# Patient Record
Sex: MALE | Race: WHITE | NOT HISPANIC OR LATINO | Employment: OTHER | URBAN - METROPOLITAN AREA
[De-identification: names, ages, dates, MRNs, and addresses within clinical notes are randomized per-mention and may not be internally consistent; named-entity substitution may affect disease eponyms.]

---

## 2018-04-11 ENCOUNTER — TRANSFERRED RECORDS (OUTPATIENT)
Dept: HEALTH INFORMATION MANAGEMENT | Facility: CLINIC | Age: 68
End: 2018-04-11

## 2018-05-07 ENCOUNTER — TRANSFERRED RECORDS (OUTPATIENT)
Dept: HEALTH INFORMATION MANAGEMENT | Facility: CLINIC | Age: 68
End: 2018-05-07

## 2018-08-29 ENCOUNTER — TRANSFERRED RECORDS (OUTPATIENT)
Dept: HEALTH INFORMATION MANAGEMENT | Facility: CLINIC | Age: 68
End: 2018-08-29

## 2018-08-29 LAB
ALT SERPL-CCNC: 28 U/L (ref 0–78)
AST SERPL-CCNC: 17 U/L (ref 0–34)
CHOLEST SERPL-MCNC: 151 MG/DL (ref 97–200)
CREAT SERPL-MCNC: 1.35 MG/DL (ref 0.6–1.4)
GFR SERPL CREATININE-BSD FRML MDRD: 52 ML/MIN/1.73M2
GLUCOSE SERPL-MCNC: 96 MG/DL (ref 70–99)
HDLC SERPL-MCNC: 60 MG/DL (ref 32–72)
LDLC SERPL CALC-MCNC: 73 MG/DL (ref 0–129)
POTASSIUM SERPL-SCNC: 3.7 MEQ/L (ref 3.6–5.2)
TRIGL SERPL-MCNC: 91 MG/DL (ref 30–200)
TSH SERPL-ACNC: 2.59 UIU/ML (ref 0.34–4.82)

## 2018-08-30 ENCOUNTER — TRANSFERRED RECORDS (OUTPATIENT)
Dept: HEALTH INFORMATION MANAGEMENT | Facility: CLINIC | Age: 68
End: 2018-08-30

## 2018-09-18 ENCOUNTER — TRANSFERRED RECORDS (OUTPATIENT)
Dept: HEALTH INFORMATION MANAGEMENT | Facility: CLINIC | Age: 68
End: 2018-09-18

## 2018-09-27 ENCOUNTER — TRANSFERRED RECORDS (OUTPATIENT)
Dept: HEALTH INFORMATION MANAGEMENT | Facility: CLINIC | Age: 68
End: 2018-09-27

## 2019-05-01 ENCOUNTER — TRANSFERRED RECORDS (OUTPATIENT)
Dept: HEALTH INFORMATION MANAGEMENT | Facility: CLINIC | Age: 69
End: 2019-05-01

## 2019-08-01 ENCOUNTER — TRANSFERRED RECORDS (OUTPATIENT)
Dept: HEALTH INFORMATION MANAGEMENT | Facility: CLINIC | Age: 69
End: 2019-08-01

## 2021-02-02 ENCOUNTER — OFFICE VISIT (OUTPATIENT)
Dept: FAMILY MEDICINE | Facility: CLINIC | Age: 71
End: 2021-02-02
Payer: MEDICARE

## 2021-02-02 VITALS
SYSTOLIC BLOOD PRESSURE: 118 MMHG | TEMPERATURE: 97.5 F | HEART RATE: 57 BPM | WEIGHT: 199.8 LBS | DIASTOLIC BLOOD PRESSURE: 72 MMHG | HEIGHT: 69 IN | OXYGEN SATURATION: 97 % | BODY MASS INDEX: 29.59 KG/M2

## 2021-02-02 DIAGNOSIS — Z13.6 ENCOUNTER FOR ABDOMINAL AORTIC ANEURYSM (AAA) SCREENING: ICD-10-CM

## 2021-02-02 DIAGNOSIS — R19.4 RECENT CHANGE IN FREQUENCY OF BOWEL MOVEMENTS: ICD-10-CM

## 2021-02-02 DIAGNOSIS — H90.3 SENSORINEURAL HEARING LOSS (SNHL) OF BOTH EARS: ICD-10-CM

## 2021-02-02 DIAGNOSIS — Z00.00 ENCOUNTER FOR MEDICARE ANNUAL WELLNESS EXAM: Primary | ICD-10-CM

## 2021-02-02 DIAGNOSIS — Z86.0100 HISTORY OF COLONIC POLYPS: ICD-10-CM

## 2021-02-02 DIAGNOSIS — E78.5 DYSLIPIDEMIA: ICD-10-CM

## 2021-02-02 DIAGNOSIS — M85.80 AGE-RELATED BONE LOSS: ICD-10-CM

## 2021-02-02 DIAGNOSIS — Z23 NEED FOR VACCINATION: ICD-10-CM

## 2021-02-02 PROBLEM — E78.00 HIGH CHOLESTEROL: Status: ACTIVE | Noted: 2021-02-02

## 2021-02-02 LAB
BASOPHILS # BLD AUTO: 0 10E9/L (ref 0–0.2)
BASOPHILS NFR BLD AUTO: 0.5 %
DIFFERENTIAL METHOD BLD: ABNORMAL
EOSINOPHIL # BLD AUTO: 0.1 10E9/L (ref 0–0.7)
EOSINOPHIL NFR BLD AUTO: 1.2 %
ERYTHROCYTE [DISTWIDTH] IN BLOOD BY AUTOMATED COUNT: 13.8 % (ref 10–15)
HCT VFR BLD AUTO: 43.3 % (ref 40–53)
HGB BLD-MCNC: 14.3 G/DL (ref 13.3–17.7)
LYMPHOCYTES # BLD AUTO: 1.1 10E9/L (ref 0.8–5.3)
LYMPHOCYTES NFR BLD AUTO: 25.3 %
MCH RBC QN AUTO: 30 PG (ref 26.5–33)
MCHC RBC AUTO-ENTMCNC: 33 G/DL (ref 31.5–36.5)
MCV RBC AUTO: 91 FL (ref 78–100)
MONOCYTES # BLD AUTO: 0.4 10E9/L (ref 0–1.3)
MONOCYTES NFR BLD AUTO: 9.7 %
NEUTROPHILS # BLD AUTO: 2.7 10E9/L (ref 1.6–8.3)
NEUTROPHILS NFR BLD AUTO: 63.3 %
PLATELET # BLD AUTO: 129 10E9/L (ref 150–450)
RBC # BLD AUTO: 4.76 10E12/L (ref 4.4–5.9)
WBC # BLD AUTO: 4.3 10E9/L (ref 4–11)

## 2021-02-02 PROCEDURE — 99203 OFFICE O/P NEW LOW 30 MIN: CPT | Mod: 25 | Performed by: INTERNAL MEDICINE

## 2021-02-02 PROCEDURE — 36415 COLL VENOUS BLD VENIPUNCTURE: CPT | Performed by: INTERNAL MEDICINE

## 2021-02-02 PROCEDURE — G0009 ADMIN PNEUMOCOCCAL VACCINE: HCPCS | Mod: 59 | Performed by: INTERNAL MEDICINE

## 2021-02-02 PROCEDURE — 85025 COMPLETE CBC W/AUTO DIFF WBC: CPT | Performed by: INTERNAL MEDICINE

## 2021-02-02 PROCEDURE — 80053 COMPREHEN METABOLIC PANEL: CPT | Performed by: INTERNAL MEDICINE

## 2021-02-02 PROCEDURE — 80061 LIPID PANEL: CPT | Performed by: INTERNAL MEDICINE

## 2021-02-02 PROCEDURE — 82306 VITAMIN D 25 HYDROXY: CPT | Performed by: INTERNAL MEDICINE

## 2021-02-02 PROCEDURE — 90471 IMMUNIZATION ADMIN: CPT | Performed by: INTERNAL MEDICINE

## 2021-02-02 PROCEDURE — 90714 TD VACC NO PRESV 7 YRS+ IM: CPT | Performed by: INTERNAL MEDICINE

## 2021-02-02 PROCEDURE — G0438 PPPS, INITIAL VISIT: HCPCS | Performed by: INTERNAL MEDICINE

## 2021-02-02 PROCEDURE — 90732 PPSV23 VACC 2 YRS+ SUBQ/IM: CPT | Performed by: INTERNAL MEDICINE

## 2021-02-02 RX ORDER — SIMVASTATIN 40 MG
40 TABLET ORAL AT BEDTIME
Qty: 90 TABLET | Refills: 1 | Status: SHIPPED | OUTPATIENT
Start: 2021-02-02 | End: 2021-08-18

## 2021-02-02 RX ORDER — SIMVASTATIN 40 MG
TABLET ORAL
COMMUNITY
Start: 2021-01-20 | End: 2021-02-02

## 2021-02-02 ASSESSMENT — ANXIETY QUESTIONNAIRES
5. BEING SO RESTLESS THAT IT IS HARD TO SIT STILL: NOT AT ALL
7. FEELING AFRAID AS IF SOMETHING AWFUL MIGHT HAPPEN: NOT AT ALL
2. NOT BEING ABLE TO STOP OR CONTROL WORRYING: NOT AT ALL
3. WORRYING TOO MUCH ABOUT DIFFERENT THINGS: NOT AT ALL
GAD7 TOTAL SCORE: 0
6. BECOMING EASILY ANNOYED OR IRRITABLE: NOT AT ALL
1. FEELING NERVOUS, ANXIOUS, OR ON EDGE: NOT AT ALL

## 2021-02-02 ASSESSMENT — PATIENT HEALTH QUESTIONNAIRE - PHQ9: 5. POOR APPETITE OR OVEREATING: NOT AT ALL

## 2021-02-02 ASSESSMENT — MIFFLIN-ST. JEOR: SCORE: 1646.67

## 2021-02-02 NOTE — PROGRESS NOTES
Assessment and Plan    1. Encounter for Medicare annual wellness exam  Seeing this patient first time today. No records available, except Covid test at . Due for all HM.   - [place] Medicare AAA Screening; Future  - Lipid panel reflex to direct LDL Fasting  - Comprehensive metabolic panel  - CBC with platelets differential  - Pneumococcal vaccine 23 valent PPSV23  (Pneumovax) [00105]  - TD PRESERV FREE, IM (7+ YRS)  - Vitamin D Deficiency  - simvastatin (ZOCOR) 40 MG tablet; Take 1 tablet (40 mg) by mouth At Bedtime  Dispense: 90 tablet; Refill: 1    2. Dyslipidemia  - Lipid panel reflex to direct LDL Fasting  - simvastatin (ZOCOR) 40 MG tablet; Take 1 tablet (40 mg) by mouth At Bedtime  Dispense: 90 tablet; Refill: 1    3. History of colonic polyps  Pt has Hx of Colonic polyps, has Colonoscopy Q 5 yrs , awaiting for records. Last colonoscopy when pt was 68 yrs old as confirmed by him. Not due until 2023.     4. Sensorineural hearing loss (SNHL) of both ears  Previous Audiology test done at [place], will await for records.     5. Encounter for abdominal aortic aneurysm (AAA) screening  - [place] A[place] Medicare AAA Screening; Future    6. Age-related bone loss  - Vitamin D Deficiency    7. Need for vaccination  - Pneumococcal vaccine 23 valent PPSV23  (Pneumovax) [05323]  - TD PRESERV FREE, IM (7+ YRS)    8. Recent change in frequency of bowel movements  New problem, which patient has this issue. Not on any medications other than Simvastatin, no stress going on. Differential diagnosis of possible enteric infection will be checked before considering GI referral .   - Enteric Bacteria and Virus Panel by SACHI Stool; Future         Patient Instructions   Please do the lab work .   Please schedule AAA screening as discussed.    Continue the current Simvastatin.     ===========================    [facility]  ( [facility] )   Call : [phone]  Toll Free :  [phone]    ==============================    [facility]  ( [place] )   [facility]  Call : [phone]  Toll Free : [phone]    ==============================  [facility] [place]  Phone : [phone]    ===============================    [facility]   ( All Locations )   Call : [phone]  Toll Free : [phone]        Return in about 6 months (around 8/2/2021), or if symptoms worsen or fail to improve, for Follow up of last visit.    Susanna Acosta MD  [facility] [place]        Delfino Mendes is a 70 year old who presents to clinic today for the following health issues     HPI       New Patient/Transfer of Care    Annual Wellness Visit    Patient has been advised of split billing requirements and indicates understanding: Yes     Are you in the first 12 months of your Medicare Part B coverage?  No    Physical Health:    In general, how would you rate your overall physical health? excellent    Outside of work, how many days during the week do you exercise?6-7 days/week    Outside of work, approximately how many minutes a day do you exercise?greater than 60 minutes    If you drink alcohol do you typically have >3 drinks per day or >7 drinks per week? No    Do you usually eat at least 4 servings of fruit and vegetables a day, include whole grains & fiber and avoid regularly eating high fat or \"junk\" foods? Yes    Do you have any problems taking medications regularly? No    Do you have any side effects from medications? none    Needs assistance for the following daily activities: no assistance needed    Which of the following safety concerns are present in your home?  none identified     Hearing impairment: Yes, went to doctor a few years ago and saw a hearing doctor, off 10-20%.     In the past 6 months, have you been bothered by leaking of urine? yes    Mental Health:    In general, how would you " "rate your overall mental or emotional health? excellent  PHQ-2 Score:  Zero    Do you feel safe in your environment? Yes    Have you ever done Advance Care Planning? (For example, a Health Directive, POLST, or a discussion with a medical provider or your loved ones about your wishes)? Yes, patient states has an Advance Care Planning document and will bring a copy to the clinic.    Fall risk:  Fallen 2 or more times in the past year?: No  Any fall with injury in the past year?: No    Cognitive Screenin) Repeat 3 items (Leader, Season, Table)    2) Clock draw: NORMAL  3) 3 item recall: Recalls 1 object   Results: clock completed correctly, was able to say 1 of 3 words.     Mini-CogTM Copyright S Mian. Licensed by the author for use in [employer]; reprinted with permission ([email or web address]). All rights reserved.      Do you have sleep apnea, excessive snoring or daytime drowsiness?: no    Current providers sharing in care for this patient include:   Patient Care Team:  Susanna Acosta MD as PCP - General (Internal Medicine)    Patient has been advised of split billing requirements and indicates understanding: Yes    Review of Systems   Constitutional, HEENT, cardiovascular, pulmonary, GI, , musculoskeletal, neuro, skin, endocrine and psych systems are negative, except as otherwise noted.      Objective    /72   Pulse 57   Temp 97.5  F (36.4  C) (Tympanic)   Ht 1.753 m (5' 9\")   Wt 90.6 kg (199 lb 12.8 oz)   SpO2 97%   BMI 29.51 kg/m    Body mass index is 29.51 kg/m .  Physical Exam   GENERAL: healthy, alert and no distress  EYES: Eyes grossly normal to inspection, PERRL and conjunctivae and sclerae normal  HENT: ear canals and TM's normal, nose and mouth without ulcers or lesions  NECK: no adenopathy, no asymmetry, masses, or scars and thyroid normal to palpation  RESP: lungs clear to auscultation - no rales, rhonchi or wheezes  CV: regular rate and rhythm, normal S1 S2, no S3 or S4, " no murmur, click or rub, no peripheral edema and peripheral pulses strong  ABDOMEN: soft, nontender, no hepatosplenomegaly, no masses and bowel sounds normal  MS: no gross musculoskeletal defects noted, no edema  SKIN: no suspicious lesions or rashes  NEURO: Normal strength and tone, mentation intact and speech normal  PSYCH: mentation appears normal, affect normal/bright

## 2021-02-02 NOTE — Clinical Note
Please recheck my billing on this patient for any corrections.   Thank you,  Susanna Acosta MD on 2/2/2021 at 1:38 PM

## 2021-02-02 NOTE — PATIENT INSTRUCTIONS
Please do the lab work .   Please schedule AAA screening as discussed.    Continue the current Simvastatin.     ===========================    [facility]  ( [facility] )   Call : [phone]  Toll Free : [phone]    ==============================    [facility]  ( [place] )   [place] [facility]  Call : [phone]  Toll Free : [phone]    ==============================  [facility] [place]  Phone : [phone]    ===============================    [facility]   ( All Locations )   Call : [phone]  Toll Free : [phone]

## 2021-02-03 LAB
ALBUMIN SERPL-MCNC: 4.1 G/DL (ref 3.4–5)
ALP SERPL-CCNC: 51 U/L (ref 40–150)
ALT SERPL W P-5'-P-CCNC: 34 U/L (ref 0–70)
ANION GAP SERPL CALCULATED.3IONS-SCNC: 4 MMOL/L (ref 3–14)
AST SERPL W P-5'-P-CCNC: 28 U/L (ref 0–45)
BILIRUB SERPL-MCNC: 0.7 MG/DL (ref 0.2–1.3)
BUN SERPL-MCNC: 18 MG/DL (ref 7–30)
CALCIUM SERPL-MCNC: 9.6 MG/DL (ref 8.5–10.1)
CHLORIDE SERPL-SCNC: 107 MMOL/L (ref 94–109)
CHOLEST SERPL-MCNC: 201 MG/DL
CO2 SERPL-SCNC: 29 MMOL/L (ref 20–32)
CREAT SERPL-MCNC: 1.03 MG/DL (ref 0.66–1.25)
DEPRECATED CALCIDIOL+CALCIFEROL SERPL-MC: 58 UG/L (ref 20–75)
GFR SERPL CREATININE-BSD FRML MDRD: 72 ML/MIN/{1.73_M2}
GLUCOSE SERPL-MCNC: 92 MG/DL (ref 70–99)
HDLC SERPL-MCNC: 61 MG/DL
LDLC SERPL CALC-MCNC: 122 MG/DL
NONHDLC SERPL-MCNC: 140 MG/DL
POTASSIUM SERPL-SCNC: 4 MMOL/L (ref 3.4–5.3)
PROT SERPL-MCNC: 7.9 G/DL (ref 6.8–8.8)
SODIUM SERPL-SCNC: 140 MMOL/L (ref 133–144)
TRIGL SERPL-MCNC: 88 MG/DL

## 2021-02-03 ASSESSMENT — ANXIETY QUESTIONNAIRES: GAD7 TOTAL SCORE: 0

## 2021-02-05 ENCOUNTER — TELEPHONE (OUTPATIENT)
Dept: FAMILY MEDICINE | Facility: CLINIC | Age: 71
End: 2021-02-05

## 2021-02-05 DIAGNOSIS — R19.4 RECENT CHANGE IN FREQUENCY OF BOWEL MOVEMENTS: ICD-10-CM

## 2021-02-05 DIAGNOSIS — R19.4 RECENT CHANGE IN FREQUENCY OF BOWEL MOVEMENTS: Primary | ICD-10-CM

## 2021-02-05 PROCEDURE — 87506 IADNA-DNA/RNA PROBE TQ 6-11: CPT | Performed by: INTERNAL MEDICINE

## 2021-02-05 RX ORDER — BACILLUS COAGULANS/INULIN 1B-250 MG
1 CAPSULE ORAL DAILY
Qty: 90 CAPSULE | Refills: 1 | Status: SHIPPED | OUTPATIENT
Start: 2021-02-05 | End: 2024-05-07

## 2021-02-05 NOTE — TELEPHONE ENCOUNTER
----- Message from Susanna Acosta MD sent at 2/5/2021  4:50 PM CST -----  Your Stool studies are normal. Possible need of change in your diet which is causing Bowel changes. Sent in probiotic to your pharmacy for improvement. Let me know how you are doing in 4 weeks. Will placed gastroenterology referral for further recommendations.     Susanna Acosta MD on 2/5/2021 at 4:50 PM

## 2021-02-08 ENCOUNTER — HOSPITAL ENCOUNTER (OUTPATIENT)
Dept: ULTRASOUND IMAGING | Facility: CLINIC | Age: 71
Discharge: HOME | End: 2021-02-08
Attending: INTERNAL MEDICINE | Admitting: INTERNAL MEDICINE
Payer: MEDICARE

## 2021-02-08 DIAGNOSIS — Z00.00 ENCOUNTER FOR MEDICARE ANNUAL WELLNESS EXAM: ICD-10-CM

## 2021-02-08 DIAGNOSIS — Z13.6 ENCOUNTER FOR ABDOMINAL AORTIC ANEURYSM (AAA) SCREENING: ICD-10-CM

## 2021-02-08 PROCEDURE — 76706 US ABDL AORTA SCREEN AAA: CPT

## 2021-03-09 ENCOUNTER — TRANSFERRED RECORDS (OUTPATIENT)
Dept: FAMILY MEDICINE | Facility: CLINIC | Age: 71
End: 2021-03-09

## 2021-03-09 NOTE — PROGRESS NOTES
Records received from [facility] and given to Dr Acosta for review.  Maria Del Rosario Kilpatrick,

## 2021-03-17 ENCOUNTER — MYC MEDICAL ADVICE (OUTPATIENT)
Dept: FAMILY MEDICINE | Facility: CLINIC | Age: 71
End: 2021-03-17

## 2021-08-18 DIAGNOSIS — Z00.00 ENCOUNTER FOR MEDICARE ANNUAL WELLNESS EXAM: ICD-10-CM

## 2021-08-18 DIAGNOSIS — E78.5 DYSLIPIDEMIA: ICD-10-CM

## 2021-08-18 RX ORDER — SIMVASTATIN 40 MG
40 TABLET ORAL AT BEDTIME
Qty: 90 TABLET | Refills: 1 | Status: SHIPPED | OUTPATIENT
Start: 2021-08-18 | End: 2022-02-12

## 2021-08-18 NOTE — TELEPHONE ENCOUNTER
Prescription approved per [facility] Refill Protocol.    Mariela GODO RN  EP Triage

## 2021-09-09 ENCOUNTER — OFFICE VISIT (OUTPATIENT)
Dept: FAMILY MEDICINE | Facility: CLINIC | Age: 71
End: 2021-09-09
Payer: MEDICARE

## 2021-09-09 VITALS — SYSTOLIC BLOOD PRESSURE: 112 MMHG | DIASTOLIC BLOOD PRESSURE: 78 MMHG

## 2021-09-09 DIAGNOSIS — L82.1 SEBORRHEIC KERATOSES: ICD-10-CM

## 2021-09-09 DIAGNOSIS — Z85.828 HISTORY OF SKIN CANCER: ICD-10-CM

## 2021-09-09 DIAGNOSIS — D22.9 MULTIPLE BENIGN NEVI: ICD-10-CM

## 2021-09-09 DIAGNOSIS — D18.01 CHERRY ANGIOMA: ICD-10-CM

## 2021-09-09 DIAGNOSIS — L57.0 ACTINIC KERATOSES: ICD-10-CM

## 2021-09-09 DIAGNOSIS — L57.8 SOLAR ELASTOSIS: ICD-10-CM

## 2021-09-09 DIAGNOSIS — L81.4 LENTIGINES: Primary | ICD-10-CM

## 2021-09-09 PROCEDURE — 17000 DESTRUCT PREMALG LESION: CPT | Performed by: PHYSICIAN ASSISTANT

## 2021-09-09 PROCEDURE — 17003 DESTRUCT PREMALG LES 2-14: CPT | Performed by: PHYSICIAN ASSISTANT

## 2021-09-09 PROCEDURE — 99213 OFFICE O/P EST LOW 20 MIN: CPT | Mod: 25 | Performed by: PHYSICIAN ASSISTANT

## 2021-09-09 NOTE — LETTER
9/9/2021         RE: Ricahrd Kitchen  [address] [place] [address]        Dear Colleague,    Thank you for referring your patient, Richard Kitchen, to the [facility]. Please see a copy of my visit note below.    HPI:  Richard Kitchen is a 71 year old male patient here today for FBE. Has some scaly spots on face .  Patient states this has been present for a while.  Patient reports the following symptoms: scaly .  Patient reports the following previous treatments: none.  Patient reports the following modifying factors: none.  Associated symptoms: none.  Patient has no other skin complaints today.  Remainder of the HPI, Meds, PMH, Allergies, FH, and SH was reviewed in chart.    Pertinent Hx:   Skin cancer on nose and right ear in the past.  No past medical history on file.    Past Surgical History:   Procedure Laterality Date     BIOPSY  2012, 2015    Skin cancer     HERNIA REPAIR  1984        Family History   Problem Relation Age of Onset     Colon Cancer Paternal Grandmother      Colon Cancer Other      Prostate Cancer Father        Social History     Socioeconomic History     Marital status:      Spouse name: Not on file     Number of children: Not on file     Years of education: Not on file     Highest education level: Not on file   Occupational History     Not on file   Tobacco Use     Smoking status: Former Smoker     Packs/day: 0.50     Years: 1.00     Pack years: 0.50     Types: Cigarettes     Smokeless tobacco: Never Used     Tobacco comment: 1 cigareete a year with freinds but never since age of 18 yrs when he smoked for 1 year.   Substance and Sexual Activity     Alcohol use: Yes     Comment: 6 glasses of wine per week     Drug use: Never     Sexual activity: Not Currently     Partners: Female   Other Topics Concern     Parent/sibling w/ CABG, MI or angioplasty before 65F 55M? No   Social History Narrative     Not on file     Social Determinants of Health      Financial Resource Strain:      Difficulty of Paying Living Expenses:    Food Insecurity:      Worried About Running Out of Food in the Last Year:      Ran Out of Food in the Last Year:    Transportation Needs:      Lack of Transportation (Medical):      Lack of Transportation (Non-Medical):    Physical Activity:      Days of Exercise per Week:      Minutes of Exercise per Session:    Stress:      Feeling of Stress :    Social Connections:      Frequency of Communication with Friends and Family:      Frequency of Social Gatherings with Friends and Family:      Attends [employer] Services:      Active Member of Clubs or Organizations:      Attends Club or Organization Meetings:      Marital Status:    Intimate Partner Violence:      Fear of Current or Ex-Partner:      Emotionally Abused:      Physically Abused:      Sexually Abused:        Outpatient Encounter Medications as of 9/9/2021   Medication Sig Dispense Refill     Bacillus Coagulans-Inulin (PROBIOTIC) 1-250 BILLION-MG CAPS Take 1 each by mouth daily 90 capsule 1     simvastatin (ZOCOR) 40 MG tablet Take 1 tablet (40 mg) by mouth At Bedtime 90 tablet 1     No facility-administered encounter medications on file as of 9/9/2021.       Review Of Systems:  Skin: scaly spots  Eyes: negative  Ears/Nose/Throat: negative  Respiratory: No shortness of breath, dyspnea on exertion, cough, or hemoptysis  Cardiovascular: negative  Gastrointestinal: negative  Genitourinary: negative  Musculoskeletal: negative  Neurologic: negative  Psychiatric: negative  Hematologic/Lymphatic/Immunologic: negative  Endocrine: negative      Objective:     There were no vitals taken for this visit.  Eyes: Conjunctivae/lids: Normal   ENT: Lips:  Normal  MSK: Normal  Cardiovascular: Peripheral edema none  Pulm: Breathing Normal  Neuro/Psych: Orientation: A/O x 3. Normal; Mood/Affect: Normal, NAD, WDWN  Pt accompanied by: self  Following areas examined: Scalp, face, eyelids, lips, neck,  chest, abdomen, back, R&L upper and lower extremities, buttock, hips.  Pt defers exam of groin and genitals.   Daugherty skin type:ii   Findings:  Red smooth well-defined macules on trunk and extremities.  Brown, stuck-on scaly appearing papules on trunk and extremities.  Well circumscribed macules with symmetric color distribution on trunk and extremities.  Tan WD smooth macules on face, neck, trunk, and extremities.  Pink gritty macule/s on cheeks, antihelix, forehead  Rhytides, hypo/hyperpigmentation, and atrophy      Assessment and Plan:     1) Cherry angiomas, Seborrheic keratoses, Benign nevi, Lentigines     I discussed the specifics of tumor, prognosis, and genetics of benign lesions.  I explained that treatment of these lesions would be purely cosmetic and not medically neccessary.  I discussed with patient different removal options including excision, cryotherapy, cautery and /or laser.  Lesion may recur and/or may not completely resolve. May need additional treatment.     2) Actinic keratoses x 7 and solar elastosis    LN2 for 5 seconds x 2. Discussed AE include hypopigmentation (white spot) and recurrence. Follow up in 2-3 months to recheck lesions. There is a risk of AKs developing into a SCC.   Treatment options include LN2 vs PDT vs Efudex. Pt elected LN2    3) history of skin cancer  Nose and right ear  No evidence of recurrence  SUZANNA  Signs and Symptoms of non-melanoma skin cancer and ABCDEs of melanoma reviewed with patient. Patient encouraged to perform monthly self skin exams and educated on how to perform them. UV precautions reviewed with patient. Patient was asked about new or changing moles/lesions on body.   Wear a sunscreen with at least SPF 30 on your face, ears, neck and V of the chest daily. Wear sunscreen on other areas of the body if those areas are exposed to the sun throughout the day. Sunscreens can contain physical and/or chemical blockers. Physical blockers are less likely to clog  pores, these include zinc oxide and titanium dioxide. Reapply every two hour and after swimming.Sunscreen examples: https://www.ewg.org/sunscreen/    Proper skin care from [facility]:    -Eliminate harsh soaps as they strip the natural oils from the skin, often resulting in dry itchy skin ( i.e. Dial, Zest, Citizen of [place] Spring)  -Use mild soaps such as Cetaphil or Dove Sensitive Skin in the shower. You do not need to use soap on arms, legs, and trunk every time you shower unless visibly soiled.   -Avoid hot or cold showers.  -After showering, lightly dry off and apply moisturizing within 2-3 minutes. This will help trap moisture in the skin.   -Aggressive use of a moisturizer at least 1-2 times a day to the entire body (including -Vanicream, Cetaphil, Aquaphor or Cerave) and moisturize hands after every washing.  -We recommend using moisturizers that come in a tub that needs to be scooped out, not a pump. This has more of an oil base. It will hold moisture in your skin much better than a water base moisturizer. The above recommended are non-pore clogging.         It was a pleasure speaking with Richard Kitchen today.       Follow up in yearly FBE. Sooner if aks not resolved.         Again, thank you for allowing me to participate in the care of your patient.        Sincerely,        Lissy Perry PA-C

## 2021-09-09 NOTE — PROGRESS NOTES
HPI:  Richard Kitchne is a 71 year old male patient here today for FBE. Has some scaly spots on face .  Patient states this has been present for a while.  Patient reports the following symptoms: scaly .  Patient reports the following previous treatments: none.  Patient reports the following modifying factors: none.  Associated symptoms: none.  Patient has no other skin complaints today.  Remainder of the HPI, Meds, PMH, Allergies, FH, and SH was reviewed in chart.    Pertinent Hx:   Skin cancer on nose and right ear in the past.  No past medical history on file.    Past Surgical History:   Procedure Laterality Date     BIOPSY  2012, 2015    Skin cancer     HERNIA REPAIR  1984        Family History   Problem Relation Age of Onset     Colon Cancer Paternal Grandmother      Colon Cancer Other      Prostate Cancer Father        Social History     Socioeconomic History     Marital status:      Spouse name: Not on file     Number of children: Not on file     Years of education: Not on file     Highest education level: Not on file   Occupational History     Not on file   Tobacco Use     Smoking status: Former Smoker     Packs/day: 0.50     Years: 1.00     Pack years: 0.50     Types: Cigarettes     Smokeless tobacco: Never Used     Tobacco comment: 1 cigareete a year with harlan but never since age of 18 yrs when he smoked for 1 year.   Substance and Sexual Activity     Alcohol use: Yes     Comment: 6 glasses of wine per week     Drug use: Never     Sexual activity: Not Currently     Partners: Female   Other Topics Concern     Parent/sibling w/ CABG, MI or angioplasty before 65F 55M? No   Social History Narrative     Not on file     Social Determinants of Health     Financial Resource Strain:      Difficulty of Paying Living Expenses:    Food Insecurity:      Worried About Running Out of Food in the Last Year:      Ran Out of Food in the Last Year:    Transportation Needs:      Lack of Transportation (Medical):       Lack of Transportation (Non-Medical):    Physical Activity:      Days of Exercise per Week:      Minutes of Exercise per Session:    Stress:      Feeling of Stress :    Social Connections:      Frequency of Communication with Friends and Family:      Frequency of Social Gatherings with Friends and Family:      Attends Baptism Services:      Active Member of Clubs or Organizations:      Attends Club or Organization Meetings:      Marital Status:    Intimate Partner Violence:      Fear of Current or Ex-Partner:      Emotionally Abused:      Physically Abused:      Sexually Abused:        Outpatient Encounter Medications as of 9/9/2021   Medication Sig Dispense Refill     Bacillus Coagulans-Inulin (PROBIOTIC) 1-250 BILLION-MG CAPS Take 1 each by mouth daily 90 capsule 1     simvastatin (ZOCOR) 40 MG tablet Take 1 tablet (40 mg) by mouth At Bedtime 90 tablet 1     No facility-administered encounter medications on file as of 9/9/2021.       Review Of Systems:  Skin: scaly spots  Eyes: negative  Ears/Nose/Throat: negative  Respiratory: No shortness of breath, dyspnea on exertion, cough, or hemoptysis  Cardiovascular: negative  Gastrointestinal: negative  Genitourinary: negative  Musculoskeletal: negative  Neurologic: negative  Psychiatric: negative  Hematologic/Lymphatic/Immunologic: negative  Endocrine: negative      Objective:     There were no vitals taken for this visit.  Eyes: Conjunctivae/lids: Normal   ENT: Lips:  Normal  MSK: Normal  Cardiovascular: Peripheral edema none  Pulm: Breathing Normal  Neuro/Psych: Orientation: A/O x 3. Normal; Mood/Affect: Normal, NAD, WDWN  Pt accompanied by: self  Following areas examined: Scalp, face, eyelids, lips, neck, chest, abdomen, back, R&L upper and lower extremities, buttock, hips.  Pt defers exam of groin and genitals.   Daugherty skin type:ii   Findings:  Red smooth well-defined macules on trunk and extremities.  Brown, stuck-on scaly appearing papules on trunk and  extremities.  Well circumscribed macules with symmetric color distribution on trunk and extremities.  Tan WD smooth macules on face, neck, trunk, and extremities.  Pink gritty macule/s on cheeks, antihelix, forehead  Rhytides, hypo/hyperpigmentation, and atrophy      Assessment and Plan:     1) Cherry angiomas, Seborrheic keratoses, Benign nevi, Lentigines     I discussed the specifics of tumor, prognosis, and genetics of benign lesions.  I explained that treatment of these lesions would be purely cosmetic and not medically neccessary.  I discussed with patient different removal options including excision, cryotherapy, cautery and /or laser.  Lesion may recur and/or may not completely resolve. May need additional treatment.     2) Actinic keratoses x 7 and solar elastosis    LN2 for 5 seconds x 2. Discussed AE include hypopigmentation (white spot) and recurrence. Follow up in 2-3 months to recheck lesions. There is a risk of AKs developing into a SCC.   Treatment options include LN2 vs PDT vs Efudex. Pt elected LN2    3) history of skin cancer  Nose and right ear  No evidence of recurrence  SUZANNA- BCC on right nasal side wall mohs 2007  LM on right nasal ala 2009  SCCIS on right extensor forearm treated  2012  SCC on right shin  Signs and Symptoms of non-melanoma skin cancer and ABCDEs of melanoma reviewed with patient. Patient encouraged to perform monthly self skin exams and educated on how to perform them. UV precautions reviewed with patient. Patient was asked about new or changing moles/lesions on body.   Wear a sunscreen with at least SPF 30 on your face, ears, neck and V of the chest daily. Wear sunscreen on other areas of the body if those areas are exposed to the sun throughout the day. Sunscreens can contain physical and/or chemical blockers. Physical blockers are less likely to clog pores, these include zinc oxide and titanium dioxide. Reapply every two hour and after swimming.Sunscreen examples:  https://www.ewg.org/sunscreen/    Proper skin care from [facility]:    -Eliminate harsh soaps as they strip the natural oils from the skin, often resulting in dry itchy skin ( i.e. Dial, Zest, [place])  -Use mild soaps such as Cetaphil or Dove Sensitive Skin in the shower. You do not need to use soap on arms, legs, and trunk every time you shower unless visibly soiled.   -Avoid hot or cold showers.  -After showering, lightly dry off and apply moisturizing within 2-3 minutes. This will help trap moisture in the skin.   -Aggressive use of a moisturizer at least 1-2 times a day to the entire body (including -Vanicream, Cetaphil, Aquaphor or Cerave) and moisturize hands after every washing.  -We recommend using moisturizers that come in a tub that needs to be scooped out, not a pump. This has more of an oil base. It will hold moisture in your skin much better than a water base moisturizer. The above recommended are non-pore clogging.         It was a pleasure speaking with Richard Kitchen today.       Follow up in yearly FBE. Sooner if aks not resolved.

## 2021-09-09 NOTE — PATIENT INSTRUCTIONS
WOUND CARE INSTRUCTIONS  FOR CRYOSURGERY  For questions please call [phone]        This area treated with liquid nitrogen will form a blister. You do not need to bandage the area until after the blister forms and breaks (which may be a few days).  When the blister breaks, begin daily dressing changes as follows:    1) Clean and dry the area with tap water using clean Q-tip or sterile gauze pad.    2) Apply Vaseline or Aquaphor over entire wound. Other options include Polysporin ointment or Bacitracin ointment over entire wound.  Do NOT use Neosporin ointment.    3) Cover the wound with a band-aid or sterile non-stick gauze pad and micropore paper tape.      REPEAT THESE INSTRUCTIONS AT LEAST ONCE A DAY UNTIL THE WOUND HAS COMPLETELY HEALED.        It is an old wives tale that a wound heals better when it is exposed to air and allowed to dry out. The wound will heal faster with a better cosmetic result if it is kept moist with ointment and covered with a bandage.  Do not let the wound dry out.      Supplies Needed:     *Cotton tipped applicators (Q-tips)   *Polysporin ointment or Bacitracin ointment (NOT NEOSPORIN)   *Band-aids, or non stick gauze pads and micropore paper tape    PATIENT INFORMATION    During the healing process you will notice a number of changes. All wounds develop a small halo of redness surrounding the wound.  This means healing is occurring. Severe itching with extensive redness usually indicates sensitivity to the ointment or bandage tape used to dress the wound.  You should call our office if this develops.      Swelling and/or discoloration around your surgical site is common, particularly when performed around the eye.    All wounds normally drain.  The larger the wound the more drainage there will be.  After 7-10 days, you will notice the wound beginning to shrink and new skin will begin to grow.  The wound is healed when you can see skin has formed over the entire area.  A healed  wound has a healthy, shiny look to the surface and is red to dark pink in color to normalize.  Wounds may take approximately 4-6 weeks to heal.  Larger wounds may take 6-8 weeks.  After the wound is healed you may discontinue dressing changes.    You may experience a sensation of tightness as your wound heals. This is normal and will gradually subside.    Your healed wound may be sensitive to temperature changes. This sensitivity improves with time, but if you re having a lot of discomfort, try to avoid temperature extremes.    Patients frequently experience itching after their wound appears to have healed because of the continue healing under the skin.  Plain Vaseline will help relieve the itching.           Proper skin care from [facility]:    -Eliminate harsh soaps as they strip the natural oils from the skin, often resulting in dry itchy skin ( i.e. Dial, Zest, Haitian Spring)  -Use mild soaps such as Cetaphil or Dove Sensitive Skin in the shower. You do not need to use soap on arms, legs, and trunk every time you shower unless visibly soiled.   -Avoid hot or cold showers.  -After showering, lightly dry off and apply moisturizing within 2-3 minutes. This will help trap moisture in the skin.   -Aggressive use of a moisturizer at least 1-2 times a day to the entire body (including -Vanicream, Cetaphil, Aquaphor or Cerave) and moisturize hands after every washing.  -We recommend using moisturizers that come in a tub that needs to be scooped out, not a pump. This has more of an oil base. It will hold moisture in your skin much better than a water base moisturizer. The above recommended are non-pore clogging.      Wear a sunscreen with at least SPF 30 on your face, ears, neck and V of the chest daily. Wear sunscreen on other areas of the body if those areas are exposed to the sun throughout the day. Sunscreens can contain physical and/or chemical blockers. Physical blockers are less likely to clog pores,  these include zinc oxide and titanium dioxide. Reapply every two hour and after swimming.     Sunscreen examples: https://www.ewg.org/sunscreen/    UV radiation  UVA radiation remains constant throughout the day and throughout the year. It is a longer wavelength than UVB and therefore penetrates deeper into the skin leading to immediate and delayed tanning, photoaging, and skin cancer. 70-80% of UVA and UVB radiation occurs between the hours of 10am-2pm.  UVB radiation  UVB radiation causes the most harmful effects and is more significant during the summer months. However, snow and ice can reflect UVB radiation leading to skin damage during the winter months as well. UVB radiation is responsible for tanning, burning, inflammation, delayed erythema (pinkness), pigmentation (brown spots), and skin cancer.     I recommend self monthly full body exams and yearly full body exams with a dermatology provider. If you develop a new or changing lesion please follow up for examination. Most skin cancers are pink and scaly or pink and pearly. However, we do see blue/brown/black skin cancers.  Consider the ABCDEs of melanoma when giving yourself your monthly full body exam ( don't forget the groin, buttocks, feet, toes, etc). A-asymmetry, B-borders, C-color, D-diameter, E-elevation or evolving. If you see any of these changes please follow up in clinic. If you cannot see your back I recommend purchasing a hand held mirror to use with a larger wall mirror.

## 2021-09-17 ENCOUNTER — TRANSFERRED RECORDS (OUTPATIENT)
Dept: HEALTH INFORMATION MANAGEMENT | Facility: CLINIC | Age: 71
End: 2021-09-17

## 2021-10-11 ENCOUNTER — HEALTH MAINTENANCE LETTER (OUTPATIENT)
Age: 71
End: 2021-10-11

## 2021-10-29 ENCOUNTER — MYC MEDICAL ADVICE (OUTPATIENT)
Dept: FAMILY MEDICINE | Facility: CLINIC | Age: 71
End: 2021-10-29

## 2022-02-10 DIAGNOSIS — E78.5 DYSLIPIDEMIA: ICD-10-CM

## 2022-02-10 DIAGNOSIS — Z00.00 ENCOUNTER FOR MEDICARE ANNUAL WELLNESS EXAM: ICD-10-CM

## 2022-02-11 NOTE — TELEPHONE ENCOUNTER
Routing refill request to provider for review/approval because:  Labs not current:  PHIL GOOD RN  EP Triage

## 2022-02-12 RX ORDER — SIMVASTATIN 40 MG
40 TABLET ORAL AT BEDTIME
Qty: 90 TABLET | Refills: 0 | Status: SHIPPED | OUTPATIENT
Start: 2022-02-12 | End: 2022-05-16

## 2022-02-12 NOTE — TELEPHONE ENCOUNTER
Refill done.    Please call the patient and schedule AWV which he is due at this time, to further take care of his medical conditions and medications.     Thank you,  Susanna Acosta MD on 2/12/2022

## 2022-02-15 NOTE — PROGRESS NOTES
Nurse Note      Itinerary:  [place]       Departure Date: 2-      Return Date: 2/27/22      Length of Trip 10 days      Reason for Travel: Tourism           Urban or rural: both      Accommodations: Hotel        IMMUNIZATION HISTORY  Have you received any immunizations within the past 4 weeks?  No  Have you ever fainted from having your blood drawn or from an injection?  No  Have you ever had a fever reaction to vaccination?  No  Have you ever had any bad reaction or side effect from any vaccination?  No  Have you ever had hepatitis A or B vaccine?  unsure  Do you live (or work closely) with anyone who has AIDS, an AIDS-like condition, any other immune disorder or who is on chemotherapy for cancer?  No  Do you have a family history of immunodeficiency?  No  Have you received any injection of immune globulin or any blood products during the past 12 months?  No    Patient roomed by AMINA Stauffer  Richard Kitchen is a 71 year old male seen today alone for counsultation for international travel.   Patient will be departing in  1 day(s) and  traveling with organized tour group  And with friends .      Patient itinerary :  will be in the [facility] and [facility] [place] which risk for Dengue Fever, food borne illnesses, Typhoid and Covid. exposure.      Patient's activities will include sightseeing.    Patient's country [place] birth is [place]    Special medical concerns: recent loss of loved one ( wife )   Pre-travel questionnaire was completed by patient and reviewed by provider.     Vitals: BP (!) 144/72 (BP Location: Left arm, Patient Position: Sitting)   Pulse 66   Temp 98  F (36.7  C) (Temporal)   Wt 87 kg (191 lb 11.2 oz)   SpO2 97%   BMI 28.31 kg/m    BMI= Body mass index is 28.31 kg/m .    EXAM:  General:  Well-nourished, well-developed in no acute distress.  Appears to be stated age, interacts appropriately and expresses understanding of information given to patient.    Current  Outpatient Medications   Medication Sig Dispense Refill     azithromycin (ZITHROMAX) 500 MG tablet Take 1 tablet (500 mg) by mouth daily for 3 doses Take 1 tablet a day for up to 3 days for severe diarrhea 3 tablet 0     Bacillus Coagulans-Inulin (PROBIOTIC) 1-250 BILLION-MG CAPS Take 1 each by mouth daily 90 capsule 1     simvastatin (ZOCOR) 40 MG tablet Take 1 tablet (40 mg) by mouth At Bedtime 90 tablet 0     Patient Active Problem List   Diagnosis     High cholesterol     Sensorineural hearing loss (SNHL) of both ears     Allergies   Allergen Reactions     Similasan Hay Fever Relief [Cold-Eeze]          Immunizations discussed include:   Covid 19: Up to date  Hepatitis A:  Ordered/given today, risks, benefits and side effects reviewed  Hepatitis B: future order written   Influenza: Up to date  Typhoid: Ordered/given today, risks, benefits and side effects reviewed  Rabies: Not indicated  Yellow Fever: Not indicated  Japanese Encephalitis: Not indicated  Meningococcus: Not indicated  Tetanus/Diphtheria: Up to date  Measles/Mumps/Rubella: Immune by disease history per patient report  Cholera: Not needed  Polio: Up to date  Pneumococcal: Up to date  Varicella: Immune by disease history per patient report  Shingrix: advised to get at future wellness visit   HPV:  Not indicated     TB: low risk     ASSESSMENT/PLAN:  Richard was seen today for travel clinic.    Diagnoses and all orders for this visit:    Travel advice encounter  -     HEPATITIS A VACCINE (ADULT)  -     TYPHOID VACCINE, IM  -     azithromycin (ZITHROMAX) 500 MG tablet; Take 1 tablet (500 mg) by mouth daily for 3 doses Take 1 tablet a day for up to 3 days for severe diarrhea    Need for hepatitis B vaccination  -     HEP B VAC ADULT 3 DOSE IM; Future    Other orders  -     Cancel: HEP B VAC ADULT 3 DOSE IM      I have reviewed general recommendations for safe travel   including: food/water precautions, insect precautions, safer sex   practices given high  prevalence of Zika, HIV and other STDs,   roadway safety. Educational materials and [facility] report provided.    [place] prophylaxis recommended: none  Symptomatic treatment for traveler's diarrhea: azithromycin  [place] illness prevention and treatment: none    Personal protective measures reviewed including hand sanitizing and contact precautions for the prevention of viral illnesses. Cover coughs and masking  during travel and upon return.  Current COVID 19 pandemic.   Monitor / follow current CDC guidelines.    Country specific and CDC Covid 19  testing requirements and resources given to patient.    Evacuation insurance advised and resources were provided to patient.  Travel Advisory: Level 4 - Do Not Travel..      Total visit time 30 minutes  with over 50% of time spent counseling patient as detailed above.    Peggy Gimenez CNP

## 2022-02-16 ENCOUNTER — OFFICE VISIT (OUTPATIENT)
Dept: FAMILY MEDICINE | Facility: CLINIC | Age: 72
End: 2022-02-16
Payer: MEDICARE

## 2022-02-16 VITALS
OXYGEN SATURATION: 97 % | WEIGHT: 191.7 LBS | HEART RATE: 66 BPM | BODY MASS INDEX: 28.31 KG/M2 | TEMPERATURE: 98 F | DIASTOLIC BLOOD PRESSURE: 72 MMHG | SYSTOLIC BLOOD PRESSURE: 144 MMHG

## 2022-02-16 DIAGNOSIS — Z71.84 TRAVEL ADVICE ENCOUNTER: Primary | ICD-10-CM

## 2022-02-16 DIAGNOSIS — Z23 NEED FOR HEPATITIS B VACCINATION: ICD-10-CM

## 2022-02-16 PROCEDURE — 90632 HEPA VACCINE ADULT IM: CPT | Mod: GA | Performed by: NURSE PRACTITIONER

## 2022-02-16 PROCEDURE — 90471 IMMUNIZATION ADMIN: CPT | Mod: GA | Performed by: NURSE PRACTITIONER

## 2022-02-16 PROCEDURE — 99402 PREV MED CNSL INDIV APPRX 30: CPT | Mod: 25 | Performed by: NURSE PRACTITIONER

## 2022-02-16 PROCEDURE — 90472 IMMUNIZATION ADMIN EACH ADD: CPT | Mod: GA | Performed by: NURSE PRACTITIONER

## 2022-02-16 PROCEDURE — 90691 TYPHOID VACCINE IM: CPT | Mod: GA | Performed by: NURSE PRACTITIONER

## 2022-02-16 RX ORDER — AZITHROMYCIN 500 MG/1
500 TABLET, FILM COATED ORAL DAILY
Qty: 3 TABLET | Refills: 0 | Status: SHIPPED | OUTPATIENT
Start: 2022-02-16 | End: 2022-02-19

## 2022-02-16 NOTE — NURSING NOTE
Prior to immunization administration, verified patients identity using patient s name and date of birth. Please see Immunization Activity for additional information.     Screening Questionnaire for Adult Immunization    Are you sick today?   No   Do you have allergies to medications, food, a vaccine component or latex?   No   Have you ever had a serious reaction after receiving a vaccination?   No   Do you have a long-term health problem with heart disease, lung disease, asthma, kidney disease, metabolic disease (e.g. diabetes), anemia, or other blood disorder?   No   Do you have cancer, leukemia, HIV/AIDS, or any other immune system problem?   No   In the past 3 months, have you taken medications that affect  your immune system, such as prednisone, other steroids, or anticancer drugs; drugs for the treatment of rheumatoid arthritis, Crohn s disease, or psoriasis; or have you had radiation treatments?   No   Have you had a seizure, or a brain or other nervous system problem?   No   During the past year, have you received a transfusion of blood or blood     products, or been given immune (gamma) globulin or antiviral drug?   No   For women: Are you pregnant or is there a chance you could become        pregnant during the next month?   No   Have you received any vaccinations in the past 4 weeks?   No     Immunization questionnaire answers were all negative.        Per orders of Neeta Gimenez NP, injection of Typhoid and Hep A given by Fabienne Mitchell CMA. Patient instructed to remain in clinic for 15 minutes afterwards, and to report any adverse reaction to me immediately.       Screening performed by Fabienne Mitchell CMA on 2/16/2022 at 10:29 AM.

## 2022-02-16 NOTE — PATIENT INSTRUCTIONS
Thank you for visiting the [facility] : [phone]  Today February 16, 2022 you received the    Hepatitis A Vaccine -    Typhoid - injectable. This vaccine is valid for two years.       Follow up vaccine appointments can be made as a NURSE ONLY visit at the Travel Clinic, (BE PREPARED TO WAIT, ) or at designated [place] [facility].    If you are receiving the Rabies vaccines series, it is important that you follow the exact schedule ordered.     Pre-travel     We recommend that you purchase Medical Evacuation Insurance prior to your departure.  Https://wwwnc.cdc.gov/travel/page/insurance    [place] your travel plans with the  Department of Uni2 through STEP ( Smart Traveler Enrollment Program ) https://step.state.gov.  STEP is a free service to allow U[place] citizens and nationals traveling and living abroad to enroll their trip with the nearest U.[place]. Embassy or Consulate.    Animal Exposure: Avoid all mammals even if they look healthy.  If there is a bite, scratch or even a lick, wash area immediately with soap and water for 15 minutes and seek medical care within 24 hours for evaluation of Rabies post exposure treatment.  Contact your Medical Evacuation Insurance.    COVID 19 (Sars Cov2) prevention strategies  Physical distancing: Maintain 6 foot (2m) from others.              Avoid large gatherings and public transportation.   Avoid indoor shopping malls, theaters and restaurants   Practice consistent mask wearing covering the nose, mouth and underneath the chin when unable to maintain 6 foot distance from others.  Hand washing: frequent, thorough handwashing with soap and water for 20 seconds (or using a hand  containing 60% alcohol)   Avoid touching face, nose, eyes, mouth unless you have done appropriate hand washing as above.   Clean high touch surfaces with approved disinfectant against Covid 19  (70% Ethanol ) or a bleach solution (add 20 mL (4 teaspoons)  of bleach to 1 L (1 quart) of water;)  Be careful not to breath or touch bleach.      Travel Covid 19 Testing:  updated 12/06/2021  International travelers: Pre-travel: diagnostic testing (antigen or PCR) may be required for entry:  See country specific Embassy websites or airline websites.    [place] ENTRY Requirements: Effective December 6, 2021, all international arrivals to the [place] (regardless of vaccination status or citizenship status) by air are required to have a negative predeparture COVID-19 result from a test taken no more than 1 calendar day prior to departure of the flight to the [place]. Many complex scenarios may result from the 1-day rule. For example, for a flight that arrives in the [place] on a Monday, the test must have been taken no earlier than Sunday local time in the departure city. If the itinerary contains multiple flights, the test can be taken 1 day prior to departure of the first flight or can be taken en route, as long as the connecting airport is not in the [place]. If the test is unable to be taken en route, the traveler will not be able to enter the [place], or if the test is taken en route and is positive, the traveler will have to isolate in that location. If the itinerary contains 1 or more overnight stays en route to the [place], the test must have been taken 1 calendar day before the flight that will enter the [place]; however, if the itinerary has an overnight connection due to limitations in flight availability, retesting will not be required. If the first flight within an itinerary is delayed due to severe weather, aircraft mechanical issues, or other issues outside of the traveler's control, the traveler will only need to be retested if the delay causes the original test to be 24 hours or more past the 1-day window. If a connecting flight is delayed due to any of the above issues, the traveler will only need to be retested if the delay causes the original test to be 48 hours or more past the 1-day window. If a  trip of less than 1 day is made out the [place], a viral test taken in the [place] may be used as a predeparture test as long as the test was taken no more than 1 day prior to rearrival in the [place]; however, if a delay occurs on the return trip and the predeparture test is out of the 1-day window, the traveler will need to be retested before returning to the [place]. Noncitizen nonimmigrants who are unvaccinated remain banned from entry    Post travel: CDC recommends getting tested 3-5 days after your trip AND stay home and self-quarantine for 7 days.      COVID-19 testing scheduling number for pre-travel through [facility]  [phone] (Must have an order). Available 24 hours a day.  You can also schedule through My Chart.     Post-travel illness:  Contact your provider or [facility] if you develop a fever, rash, cough, diarrhea or other symptoms for up to 1 year after travel.  Inform your healthcare provider when and where you traveled to.    Please call the [facility] with any questions [phone]  Or send your provider a 'My Chart' note.

## 2022-03-15 ENCOUNTER — OFFICE VISIT (OUTPATIENT)
Dept: FAMILY MEDICINE | Facility: CLINIC | Age: 72
End: 2022-03-15
Payer: MEDICARE

## 2022-03-15 VITALS
DIASTOLIC BLOOD PRESSURE: 70 MMHG | HEART RATE: 63 BPM | WEIGHT: 203 LBS | TEMPERATURE: 98.2 F | SYSTOLIC BLOOD PRESSURE: 110 MMHG | HEIGHT: 69 IN | BODY MASS INDEX: 30.07 KG/M2 | OXYGEN SATURATION: 96 %

## 2022-03-15 DIAGNOSIS — Z12.5 ENCOUNTER FOR PROSTATE CANCER SCREENING: ICD-10-CM

## 2022-03-15 DIAGNOSIS — E78.5 DYSLIPIDEMIA: ICD-10-CM

## 2022-03-15 DIAGNOSIS — Z00.00 ENCOUNTER FOR MEDICARE ANNUAL WELLNESS EXAM: Primary | ICD-10-CM

## 2022-03-15 DIAGNOSIS — N18.31 STAGE 3A CHRONIC KIDNEY DISEASE (H): ICD-10-CM

## 2022-03-15 DIAGNOSIS — C43.30 MALIGNANT MELANOMA OF FACE (H): ICD-10-CM

## 2022-03-15 PROBLEM — C43.9 MALIGNANT MELANOMA (H): Status: ACTIVE | Noted: 2022-03-15

## 2022-03-15 PROCEDURE — G0439 PPPS, SUBSEQ VISIT: HCPCS | Performed by: INTERNAL MEDICINE

## 2022-03-15 ASSESSMENT — ENCOUNTER SYMPTOMS
PALPITATIONS: 0
MYALGIAS: 0
ABDOMINAL PAIN: 0
HEADACHES: 0
SORE THROAT: 0
CHILLS: 0
DIARRHEA: 0
DIZZINESS: 0
JOINT SWELLING: 0
EYE PAIN: 0
ARTHRALGIAS: 0
HEMATURIA: 0
HEMATOCHEZIA: 0
FEVER: 0
HEARTBURN: 0
NAUSEA: 0
DYSURIA: 0
FREQUENCY: 1
WEAKNESS: 0
CONSTIPATION: 0
SHORTNESS OF BREATH: 0
PARESTHESIAS: 0
COUGH: 0
NERVOUS/ANXIOUS: 0

## 2022-03-15 ASSESSMENT — PATIENT HEALTH QUESTIONNAIRE - PHQ9
10. IF YOU CHECKED OFF ANY PROBLEMS, HOW DIFFICULT HAVE THESE PROBLEMS MADE IT FOR YOU TO DO YOUR WORK, TAKE CARE OF THINGS AT HOME, OR GET ALONG WITH OTHER PEOPLE: NOT DIFFICULT AT ALL
SUM OF ALL RESPONSES TO PHQ QUESTIONS 1-9: 7
SUM OF ALL RESPONSES TO PHQ QUESTIONS 1-9: 7

## 2022-03-15 ASSESSMENT — PAIN SCALES - GENERAL: PAINLEVEL: NO PAIN (0)

## 2022-03-15 ASSESSMENT — ACTIVITIES OF DAILY LIVING (ADL): CURRENT_FUNCTION: NO ASSISTANCE NEEDED

## 2022-03-15 NOTE — PATIENT INSTRUCTIONS
"As discussed, please do the lab work in fasting - LAB ONLY appointment .   Please take SHINGRIX vaccine at pharmacy which is more cost effective than clinic per insurance.   Placed referral to Dermatology for skin check.     ================================    Patient Education     Lipid Panel  Does this test have other names?  Lipid profile, lipoprotein profile, coronary risk profile   What is this test?  This group of tests measures the amount of cholesterol and other fats in your blood.  Cholesterol and triglycerides are lipids, or fats. These fats are important for cell health, but they can be harmful when they build up in the blood. Sometimes they can lead to clogged, inflamed arteries, a condition call atherosclerosis. This may keep your heart from working normally if the arteries of your heart muscle are affected.   This panel of tests helps predict your risk for heart disease and stroke.  A lipid panel measures these fats:    Total cholesterol    LDL (\"bad\") cholesterol    HDL (\"good\") cholesterol    Triglycerides, another type of fat that causes hardening of the arteries  Why do I need this test?  You may need this panel of tests if you have a family history of heart disease or stroke.   You may also have this test if your healthcare provider believes you're at risk for heart disease. These risk factors include:     High blood pressure    Diabetes or pre-diabetes    Overweight or obesity    Smoking    Lack of exercise    Diet of unhealthy foods    Stress    High total cholesterol  If you are already being treated for heart disease, you may have this test to see if treatment is working.   What other tests might I have along with this test?  You may also need other tests to look at how well your heart is working. These tests may include:     Electrocardiogram(ECG). This test checks your heart's electrical impulses to see if it is beating normally.    Stress test. For this test you may have to exercise while " your ECG is watched for changes.    Echocardiogram. This test uses sound waves to make pictures of your heart.    Cardiac catheterization.  A healthcare provider puts a tube into your blood vessels and injects dye. X-rays are then done to look for clogs in the arteries of the heart.  You may also need tests for high blood pressure or blood sugar (glucose).   What do my test results mean?  Test results may vary depending on your age, gender, health history, the method used for the test, and other things. Your test results may not mean you have a problem. Ask your healthcare provider what your test results mean for you.    Results are given in milligrams per deciliter (mg/dL). Here are the ranges for total cholesterol in adults:     Normal: Less than 200 mg/dL    Borderline high: 200 to 239 mg/dL    High: At or above 240 mg/dL  These are the adult ranges for LDL cholesterol:     Optimal: Less than 100 mg/dL (This is the goal for people with diabetes or heart disease.)    Near optimal: 100 to 129 mg/dL    Borderline high: 130 to 159 mg/dL    High: 160 to 189 mg/dL    Very high: 190 mg/dL and higher  The above numbers are general guidelines, because actual goals depend on the number of risk factors you have for heart disease.   Your HDL cholesterol levels should be above 40 mg/dL. This type of fat is actually good for you because it lowers your risk of heart disease. The higher the number, the lower your risk. HDL levels of 60 mg/dL or above are considered the level to protect you against heart disease.   High levels of triglycerides are linked with a higher heart disease risk. Here are the adult ranges:     Normal: Less than 150 mg/dL    Borderline high: 150 to 199 mg/dL    High: 200 to 499 mg/dL    Very high: Above 500 mg/dL  Depending on your test results, your healthcare provider will decide if you need lifestyle changes or medicines to lower your cholesterol.   Your results and targets will vary according to your  age and health. If you have high blood pressure or diabetes, you're at higher risk of having heart disease. You may have to take medicine to get your cholesterol and triglyceride levels even lower.   How is this test done?  The test is done with a blood sample, which is drawn through a needle from a vein in your arm.   Does this test pose any risks?  Having a blood test with a needle carries some risks. These include bleeding, infection, bruising, and feeling lightheaded. When the needle pricks your arm or hand, you may feel a slight sting or pain. Afterward, the site may be sore.    What might affect my test results?  Being sick or under stress, and taking certain medicines can affect your results.  What you eat, how often you exercise, and if you smoke can also affect your lipid profile.   How do I prepare for the test?  You may need to fast (not eat or drink anything but water) for a certain amount of hours before this test. In addition, be sure your healthcare provider knows about all medicines, herbs, vitamins, and supplements you are taking. This includes medicines that don't need a prescription and any illegal drugs you may use.   [employer] last reviewed this educational content on 9/1/2020 2000-2021 [employer]. All rights reserved. This information is not intended as a substitute for professional medical care. Always follow your healthcare professional's instructions.

## 2022-03-15 NOTE — PROGRESS NOTES
"SUBJECTIVE:   Richard Kitchen is a 71 year old male who presents for Preventive Visit.      Patient has been advised of split billing requirements and indicates understanding: Yes  Are you in the first 12 months of your Medicare coverage?  No    Healthy Habits:     In general, how would you rate your overall health?  Good    Frequency of exercise:  6-7 days/week    Duration of exercise:  Greater than 60 minutes    Do you usually eat at least 4 servings of fruit and vegetables a day, include whole grains    & fiber and avoid regularly eating high fat or \"junk\" foods?  No    Taking medications regularly:  Yes    Medication side effects:  None, No muscle aches and No significant flushing    Ability to successfully perform activities of daily living:  No assistance needed    Home Safety:  No safety concerns identified    Hearing Impairment:  Difficulty following a conversation in a noisy restaurant or crowded room, need to ask people to speak up or repeat themselves and difficulty understanding soft or whispered speech    In the past 6 months, have you been bothered by leaking of urine? Yes    In general, how would you rate your overall mental or emotional health?  Fair      PHQ-2 Total Score: 3    Additional concerns today:  No    Do you feel safe in your environment? Yes    Have you ever done Advance Care Planning? (For example, a Health Directive, POLST, or a discussion with a medical provider or your loved ones about your wishes): N/A    Hearing Acuity: Pt able to converse without much difficulty.     Fall risk  Fallen 2 or more times in the past year?: No  Any fall with injury in the past year?: No    Cognitive Screening   1) Repeat 3 items (Leader, Season, Table)    2) Clock draw: NORMAL  3) 3 item recall: Recalls 3 objects  Results: 3 items recalled: COGNITIVE IMPAIRMENT LESS LIKELY    Mini-CogTM Copyright RAVINDRA Pappas. Licensed by the author for use in [facility]; reprinted with permission " (kendall@[facility]). All rights reserved.      Do you have sleep apnea, excessive snoring or daytime drowsiness?: no    Reviewed and updated as needed this visit by clinical staff   Tobacco  Allergies  Meds  Problems  Med Hx  Surg Hx  Fam Hx          Reviewed and updated as needed this visit by Provider   Tobacco  Allergies  Meds  Problems  Med Hx  Surg Hx  Fam Hx         Social History     Tobacco Use     Smoking status: Former Smoker     Packs/day: 0.50     Years: 1.00     Pack years: 0.50     Types: Cigarettes     Smokeless tobacco: Never Used     Tobacco comment: 1 cigareete a year with harlan but never since age of 18 yrs when he smoked for 1 year.   Substance Use Topics     Alcohol use: Yes     Comment: 6 glasses of wine per week     If you drink alcohol do you typically have >3 drinks per day or >7 drinks per week? Yes    Alcohol Use 3/15/2022   Prescreen: >3 drinks/day or >7 drinks/week? No   No flowsheet data found.    Current providers sharing in care for this patient include:   Patient Care Team:  Susanna Acosta MD as PCP - General (Internal Medicine)  Susanna Acosta MD as Assigned PCP    The following health maintenance items are reviewed in Epic and correct as of today:  Health Maintenance Due   Topic Date Due     ANNUAL REVIEW OF HM ORDERS  Never done     HEPATITIS C SCREENING  Never done     ZOSTER IMMUNIZATION (1 of 2) Never done     FALL RISK ASSESSMENT  02/02/2022     Lab work is in process  Labs reviewed in EPIC    Review of Systems   Constitutional: Negative for chills and fever.   HENT: Positive for hearing loss. Negative for congestion, ear pain and sore throat.    Eyes: Positive for visual disturbance. Negative for pain.   Respiratory: Negative for cough and shortness of breath.    Cardiovascular: Negative for chest pain, palpitations and peripheral edema.   Gastrointestinal: Negative for abdominal pain, constipation, diarrhea, heartburn, hematochezia and nausea.  "  Genitourinary: Positive for frequency. Negative for dysuria, genital sores, hematuria, impotence, penile discharge and urgency.   Musculoskeletal: Negative for arthralgias, joint swelling and myalgias.   Skin: Negative for rash.   Neurological: Negative for dizziness, weakness, headaches and paresthesias.   Psychiatric/Behavioral: Negative for mood changes. The patient is not nervous/anxious.      Constitutional, HEENT, cardiovascular, pulmonary, GI, , musculoskeletal, neuro, skin, endocrine and psych systems are negative, except as otherwise noted.    OBJECTIVE:   /70   Pulse 63   Temp 98.2  F (36.8  C) (Tympanic)   Ht 1.76 m (5' 9.29\")   Wt 92.1 kg (203 lb)   SpO2 96%   BMI 29.73 kg/m   Estimated body mass index is 29.73 kg/m  as calculated from the following:    Height as of this encounter: 1.76 m (5' 9.29\").    Weight as of this encounter: 92.1 kg (203 lb).  Physical Exam  GENERAL: healthy, alert and no distress  EYES: Eyes grossly normal to inspection, PERRL and conjunctivae and sclerae normal  HENT: ear canals and TM's normal, nose and mouth without ulcers or lesions  NECK: no adenopathy, no asymmetry, masses, or scars and thyroid normal to palpation  RESP: lungs clear to auscultation - no rales, rhonchi or wheezes  CV: regular rate and rhythm, normal S1 S2, no S3 or S4, no murmur, click or rub, no peripheral edema and peripheral pulses strong  ABDOMEN: soft, nontender, no hepatosplenomegaly, no masses and bowel sounds normal  MS: no gross musculoskeletal defects noted, no edema  SKIN: no suspicious lesions or rashes  NEURO: Normal strength and tone, mentation intact and speech normal  PSYCH: mentation appears normal, affect normal/bright    Diagnostic Test Results:  Labs reviewed in Epic    ASSESSMENT / PLAN:     Assessment and Plan    1. Encounter for Medicare annual wellness exam  Last seen pt on 2/2021 for AWV . Not on any meds except Simvastatin. Last labs at that time showing dyslipidemia " ", HTN.   - WIfe Preeti who is also my patient passed away recently on 12/2021 , fall and cardiac arrest. Pt does have son at [place] , who visit him on and off.  Denies severe symptoms of Bereavement and refused any help offered. PHQ 9 at 7.  - Lipid panel reflex to direct LDL Fasting; Future  - Comprehensive metabolic panel (BMP + Alb, Alk Phos, ALT, AST, Total. Bili, TP); Future  - CBC with platelets; Future  - Adult Dermatology Referral; Future  - PSA, screen; Future    2. Dyslipidemia  - Lipid panel reflex to direct LDL Fasting; Future    3. Stage 3a chronic kidney disease (H)  - Comprehensive metabolic panel (BMP + Alb, Alk Phos, ALT, AST, Total. Bili, TP); Future    4. Malignant melanoma of face (H)  - CBC with platelets; Future  - Adult Dermatology Referral; Future    5. Encounter for prostate cancer screening  - PSA, screen; Future         Patient Instructions   As discussed, please do the lab work in fasting - LAB ONLY appointment .   Please take SHINGRIX vaccine at pharmacy which is more cost effective than clinic per insurance.   Placed referral to Dermatology for skin check.     ================================    Patient Education     Lipid Panel  Does this test have other names?  Lipid profile, lipoprotein profile, coronary risk profile   What is this test?  This group of tests measures the amount of cholesterol and other fats in your blood.  Cholesterol and triglycerides are lipids, or fats. These fats are important for cell health, but they can be harmful when they build up in the blood. Sometimes they can lead to clogged, inflamed arteries, a condition call atherosclerosis. This may keep your heart from working normally if the arteries of your heart muscle are affected.   This panel of tests helps predict your risk for heart disease and stroke.  A lipid panel measures these fats:    Total cholesterol    LDL (\"bad\") cholesterol    HDL (\"good\") cholesterol    Triglycerides, another type of fat that " causes hardening of the arteries  Why do I need this test?  You may need this panel of tests if you have a family history of heart disease or stroke.   You may also have this test if your healthcare provider believes you're at risk for heart disease. These risk factors include:     High blood pressure    Diabetes or pre-diabetes    Overweight or obesity    Smoking    Lack of exercise    Diet of unhealthy foods    Stress    High total cholesterol  If you are already being treated for heart disease, you may have this test to see if treatment is working.   What other tests might I have along with this test?  You may also need other tests to look at how well your heart is working. These tests may include:     Electrocardiogram(ECG). This test checks your heart's electrical impulses to see if it is beating normally.    Stress test. For this test you may have to exercise while your ECG is watched for changes.    Echocardiogram. This test uses sound waves to make pictures of your heart.    Cardiac catheterization.  A healthcare provider puts a tube into your blood vessels and injects dye. X-rays are then done to look for clogs in the arteries of the heart.  You may also need tests for high blood pressure or blood sugar (glucose).   What do my test results mean?  Test results may vary depending on your age, gender, health history, the method used for the test, and other things. Your test results may not mean you have a problem. Ask your healthcare provider what your test results mean for you.    Results are given in milligrams per deciliter (mg/dL). Here are the ranges for total cholesterol in adults:     Normal: Less than 200 mg/dL    Borderline high: 200 to 239 mg/dL    High: At or above 240 mg/dL  These are the adult ranges for LDL cholesterol:     Optimal: Less than 100 mg/dL (This is the goal for people with diabetes or heart disease.)    Near optimal: 100 to 129 mg/dL    Borderline high: 130 to 159 mg/dL    High: 160  to 189 mg/dL    Very high: 190 mg/dL and higher  The above numbers are general guidelines, because actual goals depend on the number of risk factors you have for heart disease.   Your HDL cholesterol levels should be above 40 mg/dL. This type of fat is actually good for you because it lowers your risk of heart disease. The higher the number, the lower your risk. HDL levels of 60 mg/dL or above are considered the level to protect you against heart disease.   High levels of triglycerides are linked with a higher heart disease risk. Here are the adult ranges:     Normal: Less than 150 mg/dL    Borderline high: 150 to 199 mg/dL    High: 200 to 499 mg/dL    Very high: Above 500 mg/dL  Depending on your test results, your healthcare provider will decide if you need lifestyle changes or medicines to lower your cholesterol.   Your results and targets will vary according to your age and health. If you have high blood pressure or diabetes, you're at higher risk of having heart disease. You may have to take medicine to get your cholesterol and triglyceride levels even lower.   How is this test done?  The test is done with a blood sample, which is drawn through a needle from a vein in your arm.   Does this test pose any risks?  Having a blood test with a needle carries some risks. These include bleeding, infection, bruising, and feeling lightheaded. When the needle pricks your arm or hand, you may feel a slight sting or pain. Afterward, the site may be sore.    What might affect my test results?  Being sick or under stress, and taking certain medicines can affect your results.  What you eat, how often you exercise, and if you smoke can also affect your lipid profile.   How do I prepare for the test?  You may need to fast (not eat or drink anything but water) for a certain amount of hours before this test. In addition, be sure your healthcare provider knows about all medicines, herbs, vitamins, and supplements you are taking.  "This includes medicines that don't need a prescription and any illegal drugs you may use.   nodila last reviewed this educational content on 9/1/2020 2000-2021 [employer]. All rights reserved. This information is not intended as a substitute for professional medical care. Always follow your healthcare professional's instructions.                       Return in about 1 year (around 3/15/2023), or if symptoms worsen or fail to improve, for Annual Wellness Exam.    Susanna Acosta MD  [facility]      Patient has been advised of split billing requirements and indicates understanding: Yes    COUNSELING:  Reviewed preventive health counseling, as reflected in patient instructions  Special attention given to:       Regular exercise       Healthy diet/nutrition       Vision screening       Hearing screening       Dental care       Bladder control       Fall risk prevention       Prostate cancer screening    Estimated body mass index is 29.73 kg/m  as calculated from the following:    Height as of this encounter: 1.76 m (5' 9.29\").    Weight as of this encounter: 92.1 kg (203 lb).    Weight management plan: Discussed healthy diet and exercise guidelines    He reports that he has quit smoking. His smoking use included cigarettes. He has a 0.50 pack-year smoking history. He has never used smokeless tobacco.      Appropriate preventive services were discussed with this patient, including applicable screening as appropriate for cardiovascular disease, diabetes, osteopenia/osteoporosis, and glaucoma.  As appropriate for age/gender, discussed screening for colorectal cancer, prostate cancer, breast cancer, and cervical cancer. Checklist reviewing preventive services available has been given to the patient.    Reviewed patients plan of care and provided an AVS. The Basic Care Plan (routine screening as documented in Health Maintenance) for Richard meets the Care Plan requirement. This " Care Plan has been established and reviewed with the Patient.    Counseling Resources:  ATP IV Guidelines  Pooled Cohorts Equation Calculator  Breast Cancer Risk Calculator  Breast Cancer: Medication to Reduce Risk  FRAX Risk Assessment  ICSI Preventive Guidelines  Dietary Guidelines for Americans, 2010  USDA's MyPlate  ASA Prophylaxis  Lung CA Screening    Susanna Acosta MD  [facility]    Identified Health Risks:  Answers for HPI/ROS submitted by the patient on 3/15/2022  If you checked off any problems, how difficult have these problems made it for you to do your work, take care of things at home, or get along with other people?: Not difficult at all  PHQ9 TOTAL SCORE: 7

## 2022-03-24 ENCOUNTER — LAB (OUTPATIENT)
Dept: LAB | Facility: CLINIC | Age: 72
End: 2022-03-24
Payer: MEDICARE

## 2022-03-24 DIAGNOSIS — N18.31 STAGE 3A CHRONIC KIDNEY DISEASE (H): ICD-10-CM

## 2022-03-24 DIAGNOSIS — Z00.00 ENCOUNTER FOR MEDICARE ANNUAL WELLNESS EXAM: ICD-10-CM

## 2022-03-24 DIAGNOSIS — Z12.5 ENCOUNTER FOR PROSTATE CANCER SCREENING: ICD-10-CM

## 2022-03-24 DIAGNOSIS — E78.5 DYSLIPIDEMIA: ICD-10-CM

## 2022-03-24 DIAGNOSIS — C43.30 MALIGNANT MELANOMA OF FACE (H): ICD-10-CM

## 2022-03-24 LAB
ALBUMIN SERPL-MCNC: 3.7 G/DL (ref 3.4–5)
ALP SERPL-CCNC: 42 U/L (ref 40–150)
ALT SERPL W P-5'-P-CCNC: 28 U/L (ref 0–70)
ANION GAP SERPL CALCULATED.3IONS-SCNC: 6 MMOL/L (ref 3–14)
AST SERPL W P-5'-P-CCNC: 38 U/L (ref 0–45)
BILIRUB SERPL-MCNC: 0.7 MG/DL (ref 0.2–1.3)
BUN SERPL-MCNC: 21 MG/DL (ref 7–30)
CALCIUM SERPL-MCNC: 9.3 MG/DL (ref 8.5–10.1)
CHLORIDE BLD-SCNC: 107 MMOL/L (ref 94–109)
CHOLEST SERPL-MCNC: 195 MG/DL
CO2 SERPL-SCNC: 26 MMOL/L (ref 20–32)
CREAT SERPL-MCNC: 1.16 MG/DL (ref 0.66–1.25)
ERYTHROCYTE [DISTWIDTH] IN BLOOD BY AUTOMATED COUNT: 14.2 % (ref 10–15)
FASTING STATUS PATIENT QL REPORTED: YES
GFR SERPL CREATININE-BSD FRML MDRD: 67 ML/MIN/1.73M2
GLUCOSE BLD-MCNC: 99 MG/DL (ref 70–99)
HCT VFR BLD AUTO: 44.4 % (ref 40–53)
HDLC SERPL-MCNC: 74 MG/DL
HGB BLD-MCNC: 14.5 G/DL (ref 13.3–17.7)
LDLC SERPL CALC-MCNC: 106 MG/DL
MCH RBC QN AUTO: 30.6 PG (ref 26.5–33)
MCHC RBC AUTO-ENTMCNC: 32.7 G/DL (ref 31.5–36.5)
MCV RBC AUTO: 94 FL (ref 78–100)
NONHDLC SERPL-MCNC: 121 MG/DL
PLATELET # BLD AUTO: 134 10E3/UL (ref 150–450)
POTASSIUM BLD-SCNC: 4.1 MMOL/L (ref 3.4–5.3)
PROT SERPL-MCNC: 7.4 G/DL (ref 6.8–8.8)
PSA SERPL-MCNC: 4.24 UG/L (ref 0–4)
RBC # BLD AUTO: 4.74 10E6/UL (ref 4.4–5.9)
SODIUM SERPL-SCNC: 139 MMOL/L (ref 133–144)
TRIGL SERPL-MCNC: 73 MG/DL
WBC # BLD AUTO: 5.4 10E3/UL (ref 4–11)

## 2022-03-24 PROCEDURE — 80053 COMPREHEN METABOLIC PANEL: CPT

## 2022-03-24 PROCEDURE — 85027 COMPLETE CBC AUTOMATED: CPT

## 2022-03-24 PROCEDURE — G0103 PSA SCREENING: HCPCS

## 2022-03-24 PROCEDURE — 36415 COLL VENOUS BLD VENIPUNCTURE: CPT

## 2022-03-24 PROCEDURE — 80061 LIPID PANEL: CPT

## 2022-03-25 DIAGNOSIS — R97.20 ELEVATED PROSTATE SPECIFIC ANTIGEN (PSA): Primary | ICD-10-CM

## 2022-03-28 ENCOUNTER — TELEPHONE (OUTPATIENT)
Dept: FAMILY MEDICINE | Facility: CLINIC | Age: 72
End: 2022-03-28
Payer: MEDICARE

## 2022-03-28 NOTE — TELEPHONE ENCOUNTER
----- Message from Susanna Acosta MD sent at 3/25/2022  6:48 PM CDT -----  Your PSA levels are elevated, placed referral to Urology for further recommendations.     Your platelets are remaining abnormal as seen in the past due to your possible alcoholism. Please quit alcohol completely for improvement.     Let me know if you have any questions.     Thank you,  Susanna Acosta MD on 3/25/2022 at 6:47 PM

## 2022-03-28 NOTE — TELEPHONE ENCOUNTER
S/w pt and gave Dr. Acosta's reply below about test results.  Pt states he did see the blood work results.    Pt states he is in [place] and will be back next week.  Advised Urology will call to schedule an appt and if they do not call to call the clinic when back in [place] and will give the number to urology.    Pt states understanding.    Mariela GOOD RN  EP Triage

## 2022-05-12 DIAGNOSIS — E78.5 DYSLIPIDEMIA: ICD-10-CM

## 2022-05-12 RX ORDER — SIMVASTATIN 40 MG
40 TABLET ORAL AT BEDTIME
Qty: 90 TABLET | Refills: 0 | Status: CANCELLED | OUTPATIENT
Start: 2022-05-12

## 2022-05-16 RX ORDER — SIMVASTATIN 40 MG
40 TABLET ORAL AT BEDTIME
Qty: 90 TABLET | Refills: 2 | Status: SHIPPED | OUTPATIENT
Start: 2022-05-16 | End: 2023-02-08

## 2022-06-27 ENCOUNTER — TELEPHONE (OUTPATIENT)
Dept: FAMILY MEDICINE | Facility: CLINIC | Age: 72
End: 2022-06-27

## 2022-06-27 NOTE — TELEPHONE ENCOUNTER
Ok to use same day slot for getting this patient for a sooner appointment sometime this week.     Thank you,  Susanna Acosta MD on 6/27/2022 at 5:49 PM

## 2022-06-27 NOTE — TELEPHONE ENCOUNTER
Pt walked into the clinic to see his PCP for ringing in the ears. He was given an appt on 8/11 and states he needs to be seen ASAP.  Are you able to work the pt in this week? Please advise. TC please call the pt back at [phone]. Thank you.  Maria Del Rosario Kilpatrick,

## 2022-06-30 ENCOUNTER — OFFICE VISIT (OUTPATIENT)
Dept: FAMILY MEDICINE | Facility: CLINIC | Age: 72
End: 2022-06-30
Payer: MEDICARE

## 2022-06-30 VITALS
DIASTOLIC BLOOD PRESSURE: 83 MMHG | SYSTOLIC BLOOD PRESSURE: 130 MMHG | TEMPERATURE: 98.4 F | HEART RATE: 64 BPM | HEIGHT: 69 IN | OXYGEN SATURATION: 95 % | WEIGHT: 199.2 LBS | BODY MASS INDEX: 29.51 KG/M2

## 2022-06-30 DIAGNOSIS — H61.23 EXCESSIVE CERUMEN IN BOTH EAR CANALS: Primary | ICD-10-CM

## 2022-06-30 DIAGNOSIS — H93.13 TINNITUS, BILATERAL: ICD-10-CM

## 2022-06-30 PROBLEM — N28.9 RENAL INSUFFICIENCY SYNDROME: Status: ACTIVE | Noted: 2022-06-30

## 2022-06-30 PROBLEM — R55 SYNCOPE: Status: ACTIVE | Noted: 2019-05-01

## 2022-06-30 PROBLEM — R00.1 BRADYCARDIA: Status: ACTIVE | Noted: 2019-05-01

## 2022-06-30 PROBLEM — H91.93 DIMINISHED HEARING, BILATERAL: Status: ACTIVE | Noted: 2018-08-30

## 2022-06-30 PROCEDURE — 69209 REMOVE IMPACTED EAR WAX UNI: CPT | Performed by: INTERNAL MEDICINE

## 2022-06-30 PROCEDURE — 99213 OFFICE O/P EST LOW 20 MIN: CPT | Mod: 25 | Performed by: INTERNAL MEDICINE

## 2022-06-30 ASSESSMENT — PAIN SCALES - GENERAL: PAINLEVEL: NO PAIN (0)

## 2022-06-30 NOTE — PROCEDURES
Ear wash performed by rony SONI On bilateral Ears:     Instruments which are utilized to remove the impacted earwax are - an otoscope and Lavage apparatus with suction. Pt tolerated the procedure well with no complications.

## 2022-06-30 NOTE — PATIENT INSTRUCTIONS
As discussed , will do Ear wash on your ears. Please update me in couple of days if no improvement.     =============================

## 2022-06-30 NOTE — PROGRESS NOTES
Assessment and Plan  1. Excessive cerumen in both ear canals  2. Tinnitus, bilateral  New problem of bilateral tinnitus. Explained to the patient on various causes of tinnitus. Given the current positive findings of ear wax will do ear lavage with help of MA in the office and do further recommendations if no improvement. AVS given with instructions. Pt understood and agreed with the plan   - ID REMOVAL IMPACTED CERUMEN IRRIGATION/LVG UNILAT      Patient Instructions   As discussed , will do Ear wash on your ears. Please update me in couple of days if no improvement.     =============================              Return in about 9 months (around 3/20/2023), or if symptoms worsen or fail to improve, for Annual Wellness Exam.    Susanna Acosta MD  [facility] is a 72 year old presenting for the following health issues:  Ear Problem (Ringing in ears. )      History of Present Illness       Reason for visit:  Persistent ringing in my ears  Symptom intensity:  Moderate  Symptom progression:  Staying the same  Had these symptoms before:  No    He eats 2-3 servings of fruits and vegetables daily.He consumes 0 sweetened beverage(s) daily.He exercises with enough effort to increase his heart rate 60 or more minutes per day.  He exercises with enough effort to increase his heart rate 7 days per week.   He is taking medications regularly.        Allergies   Allergen Reactions     Similasan Hay Fever Relief [Cold-Eeze]      Seasonal Allergies Other (See Comments)     Rhinitis         Past Medical History:   Diagnosis Date     Malignant melanoma (H)      Malignant melanoma (H) 3/15/2022     Skin cancer        Past Surgical History:   Procedure Laterality Date     BIOPSY  2012, 2015    Skin cancer     HERNIA REPAIR  1984       Family History   Problem Relation Age of Onset     Colon Cancer Paternal Grandmother      Colon Cancer Other      Prostate Cancer Father        Social  "History     Tobacco Use     Smoking status: Former Smoker     Packs/day: 0.50     Years: 1.00     Pack years: 0.50     Types: Cigarettes     Smokeless tobacco: Never Used     Tobacco comment: 1 cigareete a year with freinds but never since age of 18 yrs when he smoked for 1 year.   Substance Use Topics     Alcohol use: Yes     Comment: 6 glasses of wine per week        Current Outpatient Medications   Medication     Bacillus Coagulans-Inulin (PROBIOTIC) 1-250 BILLION-MG CAPS     simvastatin (ZOCOR) 40 MG tablet     No current facility-administered medications for this visit.        Review of Systems   Constitutional, HEENT, cardiovascular, pulmonary, GI, , musculoskeletal, neuro, skin, endocrine and psych systems are negative, except as otherwise noted.      Objective    /83   Pulse 64   Temp 98.4  F (36.9  C) (Temporal)   Ht 1.758 m (5' 9.2\")   Wt 90.4 kg (199 lb 3.2 oz)   SpO2 95%   BMI 29.25 kg/m    Body mass index is 29.25 kg/m .  Physical Exam   GENERAL: healthy, alert and no distress  ENT Exam : Ear canals and TM's Positive for bilateral ear cerumen , nose and mouth without ulcers or lesions, NO pharyngeal erythema, Cervical lymphadenopathy or Sinus tenderness on palpation.  NECK: no adenopathy, no asymmetry, masses, or scars and thyroid normal to palpation  RESP: lungs clear to auscultation - no rales, rhonchi or wheezes  CV: regular rate and rhythm, normal S1 S2, no S3 or S4, no murmur, click or rub, no peripheral edema and peripheral pulses strong  ABDOMEN: soft, nontender, no hepatosplenomegaly, no masses and bowel sounds normal  MS: no gross musculoskeletal defects noted, no edema      "

## 2022-07-02 ENCOUNTER — TELEPHONE (OUTPATIENT)
Dept: FAMILY MEDICINE | Facility: CLINIC | Age: 72
End: 2022-07-02

## 2022-07-02 DIAGNOSIS — H93.13 TINNITUS, BILATERAL: Primary | ICD-10-CM

## 2022-07-02 NOTE — TELEPHONE ENCOUNTER
Reason for call:  Other   Patient called regarding (reason for call): call back  Additional comments: Patient was advised to call back 2 days later after having his ears cleaned - due to his ringing in ears.      Phone number to reach patient:  Cell number on file:    Telephone Information:   Mobile [phone]       Best Time:  anytime    Can we leave a detailed message on this number?  YES    Travel screening: Not Applicable

## 2022-07-03 NOTE — TELEPHONE ENCOUNTER
My Chart message sent to patient. Please call the patient and let him know if he didn't view the message.     Thank you,  Susanna Acosta MD on 7/2/2022

## 2022-07-12 NOTE — TELEPHONE ENCOUNTER
FUTURE VISIT INFORMATION      FUTURE VISIT INFORMATION:    Date: 8/16/22    Time: 11:00am    [place] [facility] [place]:    Referring provider:  Susanna Acosta MD     Referring providers clinic:  [facility]    Reason for visit/diagnosis  Tinnitus, bilateral     RECORDS REQUESTED FROM:       Clinic name Comments Records Status Imaging Status   [facility] OV/referral 6/30/22- Susanna Acosta MD      [facility] OV 9/27/18- Marc Jackson MD - request for audiogram sent 7/12                               - Request sent to [facility] 7/12/22 for audiogram done 9/27/18- [facility]  _ Audio received and sent to [facility]

## 2022-07-15 DIAGNOSIS — H90.3 SENSORINEURAL HEARING LOSS (SNHL) OF BOTH EARS: Primary | ICD-10-CM

## 2022-07-28 ENCOUNTER — OFFICE VISIT (OUTPATIENT)
Dept: UROLOGY | Facility: CLINIC | Age: 72
End: 2022-07-28
Attending: INTERNAL MEDICINE
Payer: MEDICARE

## 2022-07-28 VITALS
BODY MASS INDEX: 28.44 KG/M2 | DIASTOLIC BLOOD PRESSURE: 72 MMHG | OXYGEN SATURATION: 98 % | WEIGHT: 192 LBS | SYSTOLIC BLOOD PRESSURE: 92 MMHG | HEIGHT: 69 IN | HEART RATE: 62 BPM

## 2022-07-28 DIAGNOSIS — R97.20 ELEVATED PROSTATE SPECIFIC ANTIGEN (PSA): ICD-10-CM

## 2022-07-28 LAB
ALBUMIN UR-MCNC: NEGATIVE MG/DL
APPEARANCE UR: CLEAR
BILIRUB UR QL STRIP: NEGATIVE
COLOR UR AUTO: YELLOW
GLUCOSE UR STRIP-MCNC: NEGATIVE MG/DL
HGB UR QL STRIP: NEGATIVE
KETONES UR STRIP-MCNC: NEGATIVE MG/DL
LEUKOCYTE ESTERASE UR QL STRIP: NEGATIVE
NITRATE UR QL: NEGATIVE
PH UR STRIP: 6 [PH] (ref 5–7)
PSA SERPL-MCNC: 4.3 UG/L (ref 0–4)
SP GR UR STRIP: 1.01 (ref 1–1.03)
UROBILINOGEN UR STRIP-ACNC: 0.2 E.U./DL

## 2022-07-28 PROCEDURE — 81003 URINALYSIS AUTO W/O SCOPE: CPT | Mod: QW | Performed by: UROLOGY

## 2022-07-28 PROCEDURE — 99203 OFFICE O/P NEW LOW 30 MIN: CPT | Performed by: UROLOGY

## 2022-07-28 PROCEDURE — 36415 COLL VENOUS BLD VENIPUNCTURE: CPT | Performed by: UROLOGY

## 2022-07-28 PROCEDURE — 84153 ASSAY OF PSA TOTAL: CPT | Performed by: UROLOGY

## 2022-07-28 ASSESSMENT — PAIN SCALES - GENERAL: PAINLEVEL: NO PAIN (0)

## 2022-07-28 NOTE — PROGRESS NOTES
Chief Complaint:   Elevated PSA         Consult or Referral:     Richard Kitchen is a 72 year old male seen at the request of Dr. Acosta.         History of Present Illness:    Richard Kitchen is a very pleasant 72 year old male who presents with a history of Elevated PSA. Found on recent exam to have PSA 4.24 in March 2022. Previously was 3.8 in 2016. Does report father had prostate cancer. No significant urinary symptoms, but does have some urgency/freuqency and nocturia x 1-2. No hematuria or dysuria. No previous urologic workup.    PSA recheck today 4.3.         Past Medical History:     Past Medical History:   Diagnosis Date     Hernia, abdominal      Malignant melanoma (H)      Malignant melanoma (H) 03/15/2022     Mumps      Skin cancer           Past Surgical History:     Past Surgical History:   Procedure Laterality Date     BIOPSY  2012, 2015    Skin cancer     HERNIA REPAIR  1984     VASECTOMY            Medications     Current Outpatient Medications   Medication     Bacillus Coagulans-Inulin (PROBIOTIC) 1-250 BILLION-MG CAPS     simvastatin (ZOCOR) 40 MG tablet     No current facility-administered medications for this visit.          Family History:     Family History   Problem Relation Age of Onset     Prostate Cancer Father      Colon Cancer Paternal Grandmother      Colon Cancer Other           Social History:     Social History     Socioeconomic History     Marital status:      Spouse name: Not on file     Number of children: Not on file     Years of education: Not on file     Highest education level: Not on file   Occupational History     Not on file   Tobacco Use     Smoking status: Former Smoker     Packs/day: 0.50     Years: 1.00     Pack years: 0.50     Types: Cigarettes     Smokeless tobacco: Never Used     Tobacco comment: 1 cigareete a year with freinds but never since age of 18 yrs when he smoked for 1 year.   Vaping Use     Vaping Use: Never used   Substance and Sexual Activity  "    Alcohol use: Yes     Comment: 6 glasses of wine per week     Drug use: Never     Sexual activity: Not Currently     Partners: Female   Other Topics Concern     Parent/sibling w/ CABG, MI or angioplasty before 65F 55M? No   Social History Narrative     Not on file     Social Determinants of Health     Financial Resource Strain: Not on file   Food Insecurity: Not on file   Transportation Needs: Not on file   Physical Activity: Not on file   Stress: Not on file   Social Connections: Not on file   Intimate Partner Violence: Not on file   Housing Stability: Not on file     Retired - worked for [employer], worked as          Allergies:   Similasan hay fever relief [cold-eeze] and Seasonal allergies         Review of Systems:  From intake questionnaire     Skin: negative  Eyes: negative  Ears/Nose/Throat: negative  Respiratory: No shortness of breath, dyspnea on exertion, cough, or hemoptysis  Cardiovascular: No chest pain or palpitations  Gastrointestinal: negative; no nausea/vomiting, constipation or diarrhea  Genitourinary: as per HPI  Musculoskeletal: negative  Neurologic: negative  Psychiatric: negative  Hematologic/Lymphatic/Immunologic: negative  Endocrine: negative         Physical Exam:     Patient is a 72 year old  male   Vitals: Blood pressure 92/72, pulse 62, height 1.758 m (5' 9.2\"), weight 87.1 kg (192 lb), SpO2 98 %.  Constitutional: Body mass index is 28.19 kg/m .  Alert, no acute distress, oriented, conversant  Eyes: no scleral icterus; extraocular muscles intact, moist conjunctivae  Respiratory: no respiratory distress, or pursed lip breathing  Cardiovascular: pulses strong and intact; no obvious jugular venous distension present  Gastrointestinal: soft, nontender, no organomegaly or masses,   Musculoskeletal: extremities normal, no peripheral edema  Skin: no suspicious lesions or rashes  Neuro: Alert, oriented, speech and mentation normal  Psych: affect and mood normal, alert and " oriented to person, place and time  : ODESSA anodular, symmetric      Labs and Pathology:    I reviewed all applicable laboratory and pathology data and went over findings with patient  Significant for   Lab Results   Component Value Date    CR 1.16 03/24/2022    CR 1.03 02/02/2021    CR 1.35 08/29/2018     Prostate Specific Antigen Screen   Date Value Ref Range Status   03/24/2022 4.24 (H) 0.00 - 4.00 ug/L Final     5/10/2016 - 3.87      Outside and Past Medical records:    Review of prior external note(s) from - [facility] information from [place] reviewed  Review of the result(s) of each unique test - PSA         Assessment and Plan:     Assessment: 72 year old male with elevated PSA to 4.24 in March 2022 and 4.3 on recheck today. We had a long discussion about the utility of PSA screening.  We talked about the Pros and Cons.  We discussed the findings of the PLCO and EORTC studies.  We discussed the results of the [place] trial of treatment vs. observation in clinically detected prostate cancer as well as the results of the PIVOT trial in the context of screen-detected cancer.  We discussed the recommendations by the AUA, and USPSTF. We also discussed the significant (2-6%) and rising risk of biopsy associated sepsis.    We discussed this new diagnosis of elevated PSA with uncertain prognosis, and the role of MRI in the diagnosis of prostate cancer and the potential for performing MRI-Ultrasound Fusion biopsy if suspicious lesions are noted.     In the end given the overall low PSA and normal ODESSA, we felt that there was not strong evidence that further workup or a biopsy would be beneficial at this point, understanding that there is a small chance that a biologically important cancer may be missed. As such, he will plan to recheck his PSA in 1 year and will return if ongoing rise is noted, particularly if it rises above 5.0.    We discussed BPH and his mild urinary symptoms and option or tamsulosin, but  he prefers to avoid medications for this as of now.    Plan:  Follow-up with PCP in 1 year with PSA  Follow-up urology as needed    Orders  Orders Placed This Encounter   Procedures     UA without Microscopic [[id number]]     PSA tumor marker     Johnie Gunn MD  Urology  [facility]

## 2022-07-28 NOTE — LETTER
7/28/2022       RE: Richard Kitchen  [address] [place] [address]     Dear Colleague,    Thank you for referring your patient, Richard Kitchen, to the [facility] at [facility]. Please see a copy of my visit note below.          Chief Complaint:   Elevated PSA         Consult or Referral:     Richard Kitchen is a 72 year old male seen at the request of Dr. Acosta.         History of Present Illness:    Richard Ktichen is a very pleasant 72 year old male who presents with a history of Elevated PSA. Found on recent exam to have PSA 4.24 in March 2022. Previously was 3.8 in 2016. Does report father had prostate cancer. No significant urinary symptoms, but does have some urgency/freuqency and nocturia x 1-2. No hematuria or dysuria. No previous urologic workup.    PSA recheck today 4.3.         Past Medical History:     Past Medical History:   Diagnosis Date     Hernia, abdominal      Malignant melanoma (H)      Malignant melanoma (H) 03/15/2022     Mumps      Skin cancer           Past Surgical History:     Past Surgical History:   Procedure Laterality Date     BIOPSY  2012, 2015    Skin cancer     HERNIA REPAIR  1984     VASECTOMY            Medications     Current Outpatient Medications   Medication     Bacillus Coagulans-Inulin (PROBIOTIC) 1-250 BILLION-MG CAPS     simvastatin (ZOCOR) 40 MG tablet     No current facility-administered medications for this visit.          Family History:     Family History   Problem Relation Age of Onset     Prostate Cancer Father      Colon Cancer Paternal Grandmother      Colon Cancer Other           Social History:     Social History     Socioeconomic History     Marital status:      Spouse name: Not on file     Number of children: Not on file     Years of education: Not on file     Highest education level: Not on file   Occupational History     Not on file   Tobacco Use     Smoking status:  "Former Smoker     Packs/day: 0.50     Years: 1.00     Pack years: 0.50     Types: Cigarettes     Smokeless tobacco: Never Used     Tobacco comment: 1 cigareete a year with harlan but never since age of 18 yrs when he smoked for 1 year.   Vaping Use     Vaping Use: Never used   Substance and Sexual Activity     Alcohol use: Yes     Comment: 6 glasses of wine per week     Drug use: Never     Sexual activity: Not Currently     Partners: Female   Other Topics Concern     Parent/sibling w/ CABG, MI or angioplasty before 65F 55M? No   Social History Narrative     Not on file     Social Determinants of Health     Financial Resource Strain: Not on file   Food Insecurity: Not on file   Transportation Needs: Not on file   Physical Activity: Not on file   Stress: Not on file   Social Connections: Not on file   Intimate Partner Violence: Not on file   Housing Stability: Not on file     Retired - worked for [employer], worked as          Allergies:   Similasan hay fever relief [cold-eeze] and Seasonal allergies         Review of Systems:  From intake questionnaire     Skin: negative  Eyes: negative  Ears/Nose/Throat: negative  Respiratory: No shortness of breath, dyspnea on exertion, cough, or hemoptysis  Cardiovascular: No chest pain or palpitations  Gastrointestinal: negative; no nausea/vomiting, constipation or diarrhea  Genitourinary: as per HPI  Musculoskeletal: negative  Neurologic: negative  Psychiatric: negative  Hematologic/Lymphatic/Immunologic: negative  Endocrine: negative         Physical Exam:     Patient is a 72 year old  male   Vitals: Blood pressure 92/72, pulse 62, height 1.758 m (5' 9.2\"), weight 87.1 kg (192 lb), SpO2 98 %.  Constitutional: Body mass index is 28.19 kg/m .  Alert, no acute distress, oriented, conversant  Eyes: no scleral icterus; extraocular muscles intact, moist conjunctivae  Respiratory: no respiratory distress, or pursed lip breathing  Cardiovascular: pulses strong and " intact; no obvious jugular venous distension present  Gastrointestinal: soft, nontender, no organomegaly or masses,   Musculoskeletal: extremities normal, no peripheral edema  Skin: no suspicious lesions or rashes  Neuro: Alert, oriented, speech and mentation normal  Psych: affect and mood normal, alert and oriented to person, place and time  : ODESSA anodular, symmetric      Labs and Pathology:    I reviewed all applicable laboratory and pathology data and went over findings with patient  Significant for   Lab Results   Component Value Date    CR 1.16 03/24/2022    CR 1.03 02/02/2021    CR 1.35 08/29/2018     Prostate Specific Antigen Screen   Date Value Ref Range Status   03/24/2022 4.24 (H) 0.00 - 4.00 ug/L Final     5/10/2016 - 3.87      Outside and Past Medical records:    Review of prior external note(s) from [facility] information from [place] reviewed  Review of the result(s) of each unique test - PSA         Assessment and Plan:     Assessment: 72 year old male with elevated PSA to 4.24 in March 2022 and 4.3 on recheck today. We had a long discussion about the utility of PSA screening.  We talked about the Pros and Cons.  We discussed the findings of the PLCO and EORTC studies.  We discussed the results of the [place] trial of treatment vs. observation in clinically detected prostate cancer as well as the results of the PIVOT trial in the context of screen-detected cancer.  We discussed the recommendations by the AUA, and USPSTF. We also discussed the significant (2-6%) and rising risk of biopsy associated sepsis.    We discussed this new diagnosis of elevated PSA with uncertain prognosis, and the role of MRI in the diagnosis of prostate cancer and the potential for performing MRI-Ultrasound Fusion biopsy if suspicious lesions are noted.     In the end given the overall low PSA and normal ODESSA, we felt that there was not strong evidence that further workup or a biopsy would be beneficial at this  point, understanding that there is a small chance that a biologically important cancer may be missed. As such, he will plan to recheck his PSA in 1 year and will return if ongoing rise is noted, particularly if it rises above 5.0.    We discussed BPH and his mild urinary symptoms and option or tamsulosin, but he prefers to avoid medications for this as of now.    Plan:  Follow-up with PCP in 1 year with PSA  Follow-up urology as needed    Orders  Orders Placed This Encounter   Procedures     UA without Microscopic [[id number]]     PSA tumor marker     Johnie Gunn MD  Urology  [facility]

## 2022-08-16 ENCOUNTER — PRE VISIT (OUTPATIENT)
Dept: OTOLARYNGOLOGY | Facility: CLINIC | Age: 72
End: 2022-08-16

## 2022-08-16 NOTE — TELEPHONE ENCOUNTER
FUTURE VISIT INFORMATION      FUTURE VISIT INFORMATION:    Date: 9/21/22    Time: 1:30pm    [place] [facility] [place]FORMATION:    Referring provider:  Susanna Acosta MD     Referring providers clinic:  [facility]    Reason for visit/diagnosis  Tinnitus, bilateral      RECORDS REQUESTED FROM:         Clinic name Comments Records Status Imaging Status   [facility] OV/referral 6/30/22- Susanna Acosta MD        [facility] OV 9/27/18- Marc Jackson MD - request for audiogram sent 7/12                                                  - Request sent to [facility] 7/12/22 for audiogram done 9/27/18- [facility]  _ Audio received and sent to [facility]

## 2022-09-06 ENCOUNTER — TRANSFERRED RECORDS (OUTPATIENT)
Dept: HEALTH INFORMATION MANAGEMENT | Facility: CLINIC | Age: 72
End: 2022-09-06

## 2022-09-12 ENCOUNTER — OFFICE VISIT (OUTPATIENT)
Dept: FAMILY MEDICINE | Facility: CLINIC | Age: 72
End: 2022-09-12
Attending: INTERNAL MEDICINE
Payer: MEDICARE

## 2022-09-12 DIAGNOSIS — Z85.828 HISTORY OF NONMELANOMA SKIN CANCER: ICD-10-CM

## 2022-09-12 DIAGNOSIS — L57.0 ACTINIC KERATOSIS: ICD-10-CM

## 2022-09-12 DIAGNOSIS — D22.9 MULTIPLE BENIGN NEVI: ICD-10-CM

## 2022-09-12 DIAGNOSIS — D18.01 CHERRY ANGIOMA: ICD-10-CM

## 2022-09-12 DIAGNOSIS — L81.4 LENTIGINES: ICD-10-CM

## 2022-09-12 DIAGNOSIS — L82.1 SEBORRHEIC KERATOSES: ICD-10-CM

## 2022-09-12 DIAGNOSIS — D03.39: Primary | ICD-10-CM

## 2022-09-12 DIAGNOSIS — D48.5 NEOPLASM OF UNCERTAIN BEHAVIOR OF SKIN: ICD-10-CM

## 2022-09-12 DIAGNOSIS — L57.0 AK (ACTINIC KERATOSIS): ICD-10-CM

## 2022-09-12 PROCEDURE — 17000 DESTRUCT PREMALG LESION: CPT | Performed by: PHYSICIAN ASSISTANT

## 2022-09-12 PROCEDURE — 88305 TISSUE EXAM BY PATHOLOGIST: CPT | Performed by: PATHOLOGY

## 2022-09-12 PROCEDURE — 17003 DESTRUCT PREMALG LES 2-14: CPT | Performed by: PHYSICIAN ASSISTANT

## 2022-09-12 PROCEDURE — 99213 OFFICE O/P EST LOW 20 MIN: CPT | Mod: 25 | Performed by: PHYSICIAN ASSISTANT

## 2022-09-12 ASSESSMENT — PAIN SCALES - GENERAL: PAINLEVEL: NO PAIN (0)

## 2022-09-12 NOTE — PROGRESS NOTES
[facility] Dermatology Note  Encounter Date: Sep 12, 2022  Office Visit     Dermatology Problem List:  1. Hx of Lentigo Maligna  - R nasal ala - 2009  2. 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 AKs, s/p cryo  4. NUB x 2 - L chest, L lateral calf - s/p bx 9/12/22  ____________________________________________    Assessment & Plan:    # Actinic keratosis x9 - face.   - Cryotherapy performed today (see procedure note(s) below).     # Neoplasm of unspecified behavior of the skin (D49.2) on the L chest. The differential diagnosis includes BCC vs other.   - Shave biopsy performed today (see procedure note(s) below).     # Neoplasm of unspecified behavior of the skin (D49.2) on the L lateral calf. The differential diagnosis includes Prurigo  Nodule vs NMSC vs other.   - Shave biopsy performed today (see procedure note(s) below).     # Benign Findings: SKs, cherry angiomas, lentigines, benign nevi  - Signs and Symptoms of non-melanoma skin cancer and ABCDEs of melanoma reviewed with patient. Patient encouraged to perform monthly self skin exams and educated on how to perform them. UV precautions reviewed with patient. Patient was asked about new or changing moles/lesions on body.   - Sunscreen: Apply 20 minutes prior to going outdoors and reapply every two hours, when wet or sweating. We recommend using an SPF 30 or higher, and to use one that is water resistant.     -Continue annual skin exms    # History of NMSC. No evidence of recurrent disease.  - Continue photoprotection - recommend SPF 30 or higher with frequent reapplication  - Continue yearly skin exams  - Advised to monitor for changing, non-healing, bleeding, painful, changing, or otherwise symptomatic lesions    # Hx lentigo maligna - nasal ala in 2009  -continue with annual skin exams, annual dental, optho exams as well  - Signs and Symptoms of non-melanoma  skin cancer and ABCDEs of melanoma reviewed with patient. Patient encouraged to perform monthly self skin exams and educated on how to perform them. UV precautions reviewed with patient. Patient was asked about new or changing moles/lesions on body.   - Sunscreen: Apply 20 minutes prior to going outdoors and reapply every two hours, when wet or sweating. We recommend using an SPF 30 or higher, and to use one that is water resistant.         Procedures Performed:   - Shave biopsy x2 procedure note, location(s): L chest, L lateral calf. After discussion of benefits and risks including but not limited to bleeding, infection, scar, incomplete removal, recurrence, and non-diagnostic biopsy, written consent and photographs were obtained. The area was cleaned with isopropyl alcohol. 0.5mL of 1% lidocaine with epinephrine was injected to obtain adequate anesthesia of lesion(s). Shave biopsy at site(s) performed. Hemostasis was achieved with aluminium chloride. Petrolatum ointment and a sterile dressing were applied. The patient tolerated the procedure and no complications were noted. The patient was provided with verbal and written post care instructions.     - Cryotherapy procedure note, location(s): face. After verbal consent and discussion of risks and benefits including, but not limited to, dyspigmentation/scar, blister, and pain, 9 lesion(s) was(were) treated with 1-2 mm freeze border for 1-2 cycles with liquid nitrogen. Post cryotherapy instructions were provided.    Follow-up: 1 year(s) in-person, or earlier for new or changing lesions    Staff and Scribe:     Scribe Disclosure:  I, Girma Marcum, am serving as a scribe to document services personally performed by Annalee pSence PA-C based on data collection and the provider's statements to me.   Provider Disclosure:   The documentation recorded by the scribe accurately reflects the services I personally performed and the decisions made by me.    All risks,  benefits and alternatives were discussed with patient.  Patient is in agreement and understands the assessment and plan.  All questions were answered.    Annalee Spence PA-C, [facility]  [facility]: Phone: [phone], Fax: [phone]  [facility]: Phone: [phone],  Fax: [phone]  [facility]: Phone: [phone], Fax: [phone]    ____________________________________________    CC: No chief complaint on file.    HPI:  Mr. Richard Kitchen is a(n) 72 year old male who presents today as a return patient for [facility]. Last seen in dermatology by Lissy Perry PA-C, on 9/9/2021, at which time patient underwent cryo for treatment of actinic keratoses. Hx lentigo maligna as well as NMSC. No fhx skin cancer. No specific concerns today. No unintentional weight loss, no night sweats or fevers.   Patient is otherwise feeling well, without additional skin concerns.    Labs Reviewed:  N/A    Physical Exam:  Vitals: There were no vitals taken for this visit.  SKIN: Full skin, which includes the head/face, both arms, chest, back, abdomen,both legs, genitalia and/or groin buttocks, digits and/or nails, was examined.  - L chest - 4mm pink shiny papule  - L lateral calf - 1cm pink and brown scaly papule  - There are erythematous macules with overyling adherent scale on the face x9.   -There is a dome shaped bright red papule on the trunk.   -Multiple regular brown pigmented macules and papules are identified on the trunk.   -There is no erythema, telangectasias, nodularity, or pigmentation on the R nasal ala, R nasal side wall, R extensor forearm and R shin.  -Scattered brown macules on sun exposed areas.  -There are waxy stuck on tan to brown papules on the trunk..   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     Medications:  Current Outpatient Medications    Medication     Bacillus Coagulans-Inulin (PROBIOTIC) 1-250 BILLION-MG CAPS     simvastatin (ZOCOR) 40 MG tablet     No current facility-administered medications for this visit.      Past Medical History:   Patient Active Problem List   Diagnosis     Pure hypercholesterolemia     Sensorineural hearing loss (SNHL) of both ears     Malignant melanoma (H)     Bradycardia     Diminished hearing, bilateral     Renal insufficiency syndrome     Syncope     Elevated prostate specific antigen (PSA)     Past Medical History:   Diagnosis Date     Hernia, abdominal      Malignant melanoma (H)      Malignant melanoma (H) 03/15/2022     Mumps      Skin cancer         CC Susanna Acosta MD  [address] [place] [address] on close of this encounter.

## 2022-09-12 NOTE — PATIENT INSTRUCTIONS
Wound Care Instructions     FOR SUPERFICIAL WOUNDS     [facility] or     [facility] [phone]          AFTER 24 HOURS YOU SHOULD REMOVE THE BANDAGE AND BEGIN DAILY DRESSING CHANGES AS FOLLOWS:     1) Remove Dressing.     2) Clean and dry the area with tap water using a Q-tip or sterile gauze pad.     3) Apply Vaseline, Aquaphor, Polysporin ointment or Bacitracin ointment over entire wound.  Do NOT use Neosporin ointment.     4) Cover the wound with a band-aid, or a sterile non-stick gauze pad and micropore paper tape      REPEAT THESE INSTRUCTIONS AT LEAST ONCE A DAY UNTIL THE WOUND HAS COMPLETELY HEALED.    It is an old wives tale that a wound heals better when it is exposed to air and allowed to dry out. The wound will heal faster with a better cosmetic result if it is kept moist with ointment and covered with a bandage.    **Do not let the wound dry out.**      Supplies Needed:      *Cotton tipped applicators (Q-tips)    *Polysporin Ointment or Bacitracin Ointment (NOT NEOSPORIN)    *Band-aids or non-stick gauze pads and micropore paper tape.      PATIENT INFORMATION:    During the healing process you will notice a number of changes. All wounds develop a small halo of redness surrounding the wound.  This means healing is occurring. Severe itching with extensive redness usually indicates sensitivity to the ointment or bandage tape used to dress the wound.  You should call our office if this develops.      Swelling  and/or discoloration around your surgical site is common, particularly when performed around the eye.    All wounds normally drain.  The larger the wound the more drainage there will be.  After 7-10 days, you will notice the wound beginning to shrink and new skin will begin to grow.  The wound is healed when you can see skin has formed over the entire area.  A healed wound has a healthy, shiny look to the surface and is red to dark pink in color to normalize.   Wounds may take approximately 4-6 weeks to heal.  Larger wounds may take 6-8 weeks.  After the wound is healed you may discontinue dressing changes.    You may experience a sensation of tightness as your wound heals. This is normal and will gradually subside.    Your healed wound may be sensitive to temperature changes. This sensitivity improves with time, but if you re having a lot of discomfort, try to avoid temperature extremes.    Patients frequently experience itching after their wound appears to have healed because of the continue healing under the skin.  Plain Vaseline will help relieve the itching.        POSSIBLE COMPLICATIONS    BLEEDING:    Leave the bandage in place.  Use tightly rolled up gauze or a cloth to apply direct pressure over the bandage for 30  minutes.  Reapply pressure for an additional 30 minutes if necessary  Use additional gauze and tape to maintain pressure once the bleeding has stopped.

## 2022-09-12 NOTE — LETTER
9/12/2022         RE: Richard Kitchen  [address] [place] [address]        Dear Colleague,    Thank you for referring your patient, Richard Kitchen, to the [facility]. Please see a copy of my visit note below.    [facility] Dermatology Note  Encounter Date: Sep 12, 2022  Office Visit     Dermatology Problem List:  1. Hx of Lentigo Maligna  - R nasal ala - 2009  2. 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 AKs, s/p cryo  4. NUB x 2 - L chest, L lateral calf - s/p bx 9/12/22  ____________________________________________    Assessment & Plan:    # Actinic keratosis x9 - face.   - Cryotherapy performed today (see procedure note(s) below).     # Neoplasm of unspecified behavior of the skin (D49.2) on the L chest. The differential diagnosis includes BCC vs other.   - Shave biopsy performed today (see procedure note(s) below).     # Neoplasm of unspecified behavior of the skin (D49.2) on the L lateral calf. The differential diagnosis includes Prurigo  Nodule vs NMSC vs other.   - Shave biopsy performed today (see procedure note(s) below).     # Benign Findings: SKs, cherry angiomas, lentigines, benign nevi  - Signs and Symptoms of non-melanoma skin cancer and ABCDEs of melanoma reviewed with patient. Patient encouraged to perform monthly self skin exams and educated on how to perform them. UV precautions reviewed with patient. Patient was asked about new or changing moles/lesions on body.   - Sunscreen: Apply 20 minutes prior to going outdoors and reapply every two hours, when wet or sweating. We recommend using an SPF 30 or higher, and to use one that is water resistant.     -Continue annual skin exms    # History of NMSC. No evidence of recurrent disease.  - Continue photoprotection - recommend SPF 30 or higher with frequent reapplication  - Continue yearly skin exams  - Advised  to monitor for changing, non-healing, bleeding, painful, changing, or otherwise symptomatic lesions    # Hx lentigo maligna - nasal ala in 2009  -continue with annual skin exams, annual dental, optho exams as well  - Signs and Symptoms of non-melanoma skin cancer and ABCDEs of melanoma reviewed with patient. Patient encouraged to perform monthly self skin exams and educated on how to perform them. UV precautions reviewed with patient. Patient was asked about new or changing moles/lesions on body.   - Sunscreen: Apply 20 minutes prior to going outdoors and reapply every two hours, when wet or sweating. We recommend using an SPF 30 or higher, and to use one that is water resistant.         Procedures Performed:   - Shave biopsy x2 procedure note, location(s): L chest, L lateral calf. After discussion of benefits and risks including but not limited to bleeding, infection, scar, incomplete removal, recurrence, and non-diagnostic biopsy, written consent and photographs were obtained. The area was cleaned with isopropyl alcohol. 0.5mL of 1% lidocaine with epinephrine was injected to obtain adequate anesthesia of lesion(s). Shave biopsy at site(s) performed. Hemostasis was achieved with aluminium chloride. Petrolatum ointment and a sterile dressing were applied. The patient tolerated the procedure and no complications were noted. The patient was provided with verbal and written post care instructions.     - Cryotherapy procedure note, location(s): face. After verbal consent and discussion of risks and benefits including, but not limited to, dyspigmentation/scar, blister, and pain, 9 lesion(s) was(were) treated with 1-2 mm freeze border for 1-2 cycles with liquid nitrogen. Post cryotherapy instructions were provided.    Follow-up: 1 year(s) in-person, or earlier for new or changing lesions    Staff and Scribe:     Scribe Disclosure:  IGirma, am serving as a scribe to document services personally performed by  Annalee Spence PA-C based on data collection and the provider's statements to me.   Provider Disclosure:   The documentation recorded by the scribe accurately reflects the services I personally performed and the decisions made by me.    All risks, benefits and alternatives were discussed with patient.  Patient is in agreement and understands the assessment and plan.  All questions were answered.    Annalee Spence PA-C, [facility]  [facility]: Phone: [phone], Fax: [phone]  [facility]: Phone: [phone],  Fax: [phone]  [facility]: Phone: [phone], Fax: [phone]    ____________________________________________    CC: No chief complaint on file.    HPI:  Mr. Richard Kitchen is a(n) 72 year old male who presents today as a return patient for FBSE. Last seen in dermatology by Lissy Perry PA-C, on 9/9/2021, at which time patient underwent cryo for treatment of actinic keratoses. Hx lentigo maligna as well as NMSC. No fhx skin cancer. No specific concerns today. No unintentional weight loss, no night sweats or fevers.   Patient is otherwise feeling well, without additional skin concerns.    Labs Reviewed:  N/A    Physical Exam:  Vitals: There were no vitals taken for this visit.  SKIN: Full skin, which includes the head/face, both arms, chest, back, abdomen,both legs, genitalia and/or groin buttocks, digits and/or nails, was examined.  - L chest - 4mm pink shiny papule  - L lateral calf - 1cm pink and brown scaly papule  - There are erythematous macules with overyling adherent scale on the face x9.   -There is a dome shaped bright red papule on the trunk.   -Multiple regular brown pigmented macules and papules are identified on the trunk.   -There is no erythema, telangectasias, nodularity, or pigmentation on the R nasal ala, R nasal side wall, R extensor forearm and R shin.  -Scattered  brown macules on sun exposed areas.  -There are waxy stuck on tan to brown papules on the trunk..   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     Medications:  Current Outpatient Medications   Medication     Bacillus Coagulans-Inulin (PROBIOTIC) 1-250 BILLION-MG CAPS     simvastatin (ZOCOR) 40 MG tablet     No current facility-administered medications for this visit.      Past Medical History:   Patient Active Problem List   Diagnosis     Pure hypercholesterolemia     Sensorineural hearing loss (SNHL) of both ears     Malignant melanoma (H)     Bradycardia     Diminished hearing, bilateral     Renal insufficiency syndrome     Syncope     Elevated prostate specific antigen (PSA)     Past Medical History:   Diagnosis Date     Hernia, abdominal      Malignant melanoma (H)      Malignant melanoma (H) 03/15/2022     Mumps      Skin cancer         CC Susanna Acosta MD  [address] [place] [address] on close of this encounter.      Again, thank you for allowing me to participate in the care of your patient.        Sincerely,        Annalee Spence PA-C

## 2022-09-14 LAB
PATH REPORT.COMMENTS IMP SPEC: NORMAL
PATH REPORT.COMMENTS IMP SPEC: NORMAL
PATH REPORT.FINAL DX SPEC: NORMAL
PATH REPORT.GROSS SPEC: NORMAL
PATH REPORT.MICROSCOPIC SPEC OTHER STN: NORMAL
PATH REPORT.RELEVANT HX SPEC: NORMAL

## 2022-09-21 ENCOUNTER — OFFICE VISIT (OUTPATIENT)
Dept: AUDIOLOGY | Facility: CLINIC | Age: 72
End: 2022-09-21
Payer: MEDICARE

## 2022-09-21 ENCOUNTER — PRE VISIT (OUTPATIENT)
Dept: OTOLARYNGOLOGY | Facility: CLINIC | Age: 72
End: 2022-09-21

## 2022-09-21 ENCOUNTER — OFFICE VISIT (OUTPATIENT)
Dept: OTOLARYNGOLOGY | Facility: CLINIC | Age: 72
End: 2022-09-21
Attending: INTERNAL MEDICINE
Payer: MEDICARE

## 2022-09-21 VITALS
HEART RATE: 53 BPM | BODY MASS INDEX: 29.66 KG/M2 | WEIGHT: 202 LBS | DIASTOLIC BLOOD PRESSURE: 90 MMHG | SYSTOLIC BLOOD PRESSURE: 144 MMHG

## 2022-09-21 DIAGNOSIS — H93.13 TINNITUS, BILATERAL: ICD-10-CM

## 2022-09-21 DIAGNOSIS — H90.3 SENSORINEURAL HEARING LOSS, BILATERAL: Primary | ICD-10-CM

## 2022-09-21 PROCEDURE — 99203 OFFICE O/P NEW LOW 30 MIN: CPT | Performed by: REGISTERED NURSE

## 2022-09-21 PROCEDURE — 92550 TYMPANOMETRY & REFLEX THRESH: CPT | Performed by: AUDIOLOGIST

## 2022-09-21 PROCEDURE — 92557 COMPREHENSIVE HEARING TEST: CPT | Performed by: AUDIOLOGIST

## 2022-09-21 NOTE — PATIENT INSTRUCTIONS
- You were seen in the ENT Clinic today by Shelli Bullard NP.   - Follow up in 1-2 years with audiogram.   - Please send a [employer] message or call the ENT clinic at [phone] with any questions or concerns.

## 2022-09-21 NOTE — PROGRESS NOTES
Chief Complaint   Patient presents with     Tinnitus            Blood pressure (!) 144/90, pulse 53, weight 91.6 kg (202 lb).    Maria Ines Tobin LPN

## 2022-09-21 NOTE — LETTER
2022       RE: Richard Kitchen  [address] [place] [address]     Dear Colleague,    Thank you for referring your patient, Richard Kitchen, to the [facility] E[facility]SE [facility]ROAT [facility]INIC [place] at [facility]. Please see a copy of my visit note below.      Otolaryngology Clinic  2022    Chief Complaint:   Bilateral tinnitus       History of Present Illness:   Richard Kitchen is a 72 year old male who presents today for evaluation of bilateral tinnitus.  Patient reports a gradual onset of tinnitus that is occurred over the past year.  Reports a constant hissing noise that can fluctuate in severity but does not pulsate.  Reports that it is less noticeable when he is busy with other activities.  It does not prevent patient from falling asleep or staying asleep.  Has also noticed a gradual decline in hearing.  Patient reports having an audiogram done a few years ago in [facility] that demonstrated a 20% hearing loss.     Patient denies any otalgia, otorrhea, dizziness, facial numbness/weakness, history of frequent ear infections, or ear surgeries.  Patient does report significant life transitions over the past year.  Wife  in 2021 from a sudden heart attack.  States that tinnitus was present prior to this loss and has not seemed to worsen.    Past Medical History:  Past Medical History:   Diagnosis Date     Hernia, abdominal      Malignant melanoma (H)      Malignant melanoma (H) 03/15/2022     Mumps      Skin cancer        Past Surgical History:  Past Surgical History:   Procedure Laterality Date     BIOPSY  2015    Skin cancer     HERNIA REPAIR  1984     VASECTOMY         Medications:  Current Outpatient Medications   Medication Sig Dispense Refill     Bacillus Coagulans-Inulin (PROBIOTIC) 1-250 BILLION-MG CAPS Take 1 each by mouth daily 90 capsule 1     simvastatin (ZOCOR) 40 MG tablet Take 1 tablet (40 mg) by  mouth At Bedtime 90 tablet 2       Allergies:  Allergies   Allergen Reactions     Similasan Hay Fever Relief [Cold-Eeze]      Seasonal Allergies Other (See Comments)     Rhinitis         Social History:  Social History     Tobacco Use     Smoking status: Former Smoker     Packs/day: 0.50     Years: 1.00     Pack years: 0.50     Types: Cigarettes     Smokeless tobacco: Never Used     Tobacco comment: 1 cigareete a year with freinds but never since age of 18 yrs when he smoked for 1 year.   Vaping Use     Vaping Use: Never used   Substance Use Topics     Alcohol use: Yes     Comment: 6 glasses of wine per week     Drug use: Never       ROS: 10 point ROS neg other than the symptoms noted above in the HPI.    Physical Exam:    There were no vitals taken for this visit.     Constitutional:  The patient was unaccompanied, well-groomed, and in no acute distress.     Neurologic: Alert and oriented x 3.  Voice normal.   Psychiatric: The patient's affect was calm, cooperative, and appropriate.     Communication:  Normal; communicates verbally, normal voice quality.    Respiratory: Breathing comfortably without stridor or exertion of accessory muscles.    Ears: Pinnae and tragus non-tender.  EAC's and TM's were clear.       Audiogram: 9/21/2022 - data independently reviewed  Normal sloping to moderate SNHL bilaterally.   % at 60 dB bilaterally  Acoustic Reflexes: Present in all conditions  Tympanograms: type A bilaterally         Assessment and Plan:  1. Tinnitus, bilateral  Patient with year long history of bilateral tinnitus.  Reviewed audiogram today with patient which shows bilateral high frequency sensorineural hearing loss. Explained to patient that tinnitus is a central process but is closely correlated to hearing loss. Suspect that patient's tinnitus symptoms are related to hearing loss.     Reviewed other factors that can contribute to tinnitus including stress, anxiety, lack of sleep, and neck/muscle tension.      Explained to patient that there is no specific medication or procedure that can eliminate tinnitus, however, there are evidence-based management strategies that can be used to improve symptoms.  Patient may consider hearing aids to improve tinnitus symptoms. Discussed management strategies with patient including tinnitus masking and cognitive behavioral approaches.      Patient is comfortable monitoring symptoms at this time. Recommend follow up audiogram in 1-2 years.    Shelli Bullard DNP, APRN, CNP  Otolaryngology  Head & Neck Surgery  [phone]    30 minutes spent on the date of the encounter doing chart review, history and exam, documentation and further activities per the note      Chief Complaint   Patient presents with     Tinnitus            Blood pressure (!) 144/90, pulse 53, weight 91.6 kg (202 lb).    Maria Ines Tobin LPN        Again, thank you for allowing me to participate in the care of your patient.      Sincerely,    Flores Bullard, NP

## 2022-09-21 NOTE — PROGRESS NOTES
AUDIOLOGY REPORT    SUMMARY: Audiology visit completed. See audiogram for results.      RECOMMENDATIONS: Follow-up with ENT.    Kayode Lagunas, [facility]  Licensed Audiologist  [place] #[id number]

## 2022-09-21 NOTE — PROGRESS NOTES
Otolaryngology Clinic  2022    Chief Complaint:   Bilateral tinnitus       History of Present Illness:   Richard Kitchen is a 72 year old male who presents today for evaluation of bilateral tinnitus.  Patient reports a gradual onset of tinnitus that is occurred over the past year.  Reports a constant hissing noise that can fluctuate in severity but does not pulsate.  Reports that it is less noticeable when he is busy with other activities.  It does not prevent patient from falling asleep or staying asleep.  Has also noticed a gradual decline in hearing.  Patient reports having an audiogram done a few years ago in [facility] that demonstrated a 20% hearing loss.     Patient denies any otalgia, otorrhea, dizziness, facial numbness/weakness, history of frequent ear infections, or ear surgeries.  Patient does report significant life transitions over the past year.  Wife  in 2021 from a sudden heart attack.  States that tinnitus was present prior to this loss and has not seemed to worsen.    Past Medical History:  Past Medical History:   Diagnosis Date     Hernia, abdominal      Malignant melanoma (H)      Malignant melanoma (H) 03/15/2022     Mumps      Skin cancer        Past Surgical History:  Past Surgical History:   Procedure Laterality Date     BIOPSY  2015    Skin cancer     HERNIA REPAIR  1984     VASECTOMY         Medications:  Current Outpatient Medications   Medication Sig Dispense Refill     Bacillus Coagulans-Inulin (PROBIOTIC) 1-250 BILLION-MG CAPS Take 1 each by mouth daily 90 capsule 1     simvastatin (ZOCOR) 40 MG tablet Take 1 tablet (40 mg) by mouth At Bedtime 90 tablet 2       Allergies:  Allergies   Allergen Reactions     Similasan Hay Fever Relief [Cold-Eeze]      Seasonal Allergies Other (See Comments)     Rhinitis         Social History:  Social History     Tobacco Use     Smoking status: Former Smoker     Packs/day: 0.50     Years: 1.00     Pack years: 0.50      Types: Cigarettes     Smokeless tobacco: Never Used     Tobacco comment: 1 cigareete a year with freinds but never since age of 18 yrs when he smoked for 1 year.   Vaping Use     Vaping Use: Never used   Substance Use Topics     Alcohol use: Yes     Comment: 6 glasses of wine per week     Drug use: Never       ROS: 10 point ROS neg other than the symptoms noted above in the HPI.    Physical Exam:    There were no vitals taken for this visit.     Constitutional:  The patient was unaccompanied, well-groomed, and in no acute distress.     Neurologic: Alert and oriented x 3.  Voice normal.   Psychiatric: The patient's affect was calm, cooperative, and appropriate.     Communication:  Normal; communicates verbally, normal voice quality.    Respiratory: Breathing comfortably without stridor or exertion of accessory muscles.    Ears: Pinnae and tragus non-tender.  EAC's and TM's were clear.       Audiogram: 9/21/2022 - data independently reviewed  Normal sloping to moderate SNHL bilaterally.   % at 60 dB bilaterally  Acoustic Reflexes: Present in all conditions  Tympanograms: type A bilaterally         Assessment and Plan:  1. Tinnitus, bilateral  Patient with year long history of bilateral tinnitus.  Reviewed audiogram today with patient which shows bilateral high frequency sensorineural hearing loss. Explained to patient that tinnitus is a central process but is closely correlated to hearing loss. Suspect that patient's tinnitus symptoms are related to hearing loss.     Reviewed other factors that can contribute to tinnitus including stress, anxiety, lack of sleep, and neck/muscle tension.     Explained to patient that there is no specific medication or procedure that can eliminate tinnitus, however, there are evidence-based management strategies that can be used to improve symptoms.  Patient may consider hearing aids to improve tinnitus symptoms. Discussed management strategies with patient including tinnitus  masking and cognitive behavioral approaches.      Patient is comfortable monitoring symptoms at this time. Recommend follow up audiogram in 1-2 years.    Shelli Bullard DNP, APRN, CNP  Otolaryngology  Head & Neck Surgery  [phone]    30 minutes spent on the date of the encounter doing chart review, history and exam, documentation and further activities per the note

## 2022-09-25 ENCOUNTER — HEALTH MAINTENANCE LETTER (OUTPATIENT)
Age: 72
End: 2022-09-25

## 2023-02-08 DIAGNOSIS — E78.5 DYSLIPIDEMIA: ICD-10-CM

## 2023-02-08 RX ORDER — SIMVASTATIN 40 MG
TABLET ORAL
Qty: 90 TABLET | Refills: 0 | Status: SHIPPED | OUTPATIENT
Start: 2023-02-08 | End: 2023-04-05

## 2023-02-21 ENCOUNTER — VIRTUAL VISIT (OUTPATIENT)
Dept: FAMILY MEDICINE | Facility: CLINIC | Age: 73
End: 2023-02-21
Payer: MEDICARE

## 2023-02-21 DIAGNOSIS — Z82.0 FH: ALZHEIMER'S DISEASE: ICD-10-CM

## 2023-02-21 DIAGNOSIS — R41.3 MEMORY DIFFICULTIES: Primary | ICD-10-CM

## 2023-02-21 DIAGNOSIS — N18.31 STAGE 3A CHRONIC KIDNEY DISEASE (H): ICD-10-CM

## 2023-02-21 DIAGNOSIS — D03.39: ICD-10-CM

## 2023-02-21 PROCEDURE — 99215 OFFICE O/P EST HI 40 MIN: CPT | Mod: VID | Performed by: INTERNAL MEDICINE

## 2023-02-21 NOTE — PATIENT INSTRUCTIONS
As discussed given your risk factors of family history of Alzhiemers and memory difficulties will place referral to neurology.      We will check the pertinent lab work and do further recommendations if needed. Please make a lab only appointment for getting the lab work done.

## 2023-02-21 NOTE — PROGRESS NOTES
Vaughn is a 72 year old who is being evaluated via a billable video visit.      How would you like to obtain your AVS? MyChart  If the video visit is dropped, the invitation should be resent by: Text to cell phone: [phone]  Will anyone else be joining your video visit? No    Assessment and Plan  1. Memory difficulties  2. FH: Alzheimer's disease  New problem of memory difficulties as per the patient and also son Edinson who was in the video call today.  Though the 6 CIT score was normal today but both patient and the son are concerned given his waxing and waning type of memory issues with multiple examples where patient has forgotten things which were just mentioned to him.  Given the family history we will do neurology referral as per the options given and shared decision with the patient.  - TSH with free T4 reflex; Future  - Vitamin B12; Future  - Adult Neurology  Referral; Future    3. Stage 3a chronic kidney disease (H)  Last check in March 2022 normal, will do the urine microalbumin due at this time.  Can recheck all the labs in upcoming annual wellness visit.  - Albumin Random Urine Quantitative with Creat Ratio; Future    4. Lentigo maligna of nose (H)  Chronic problem, no new skin lesions.  To follow-up with dermatology as scheduled.      Over 40 minutes spent on reviewing patient chart,  face to face encounter, greater than 50% time spent with plan/cordination of care and documentation as above in my A/P.            Patient Instructions   As discussed given your risk factors of family history of Alzhiemers and memory difficulties will place referral to neurology.      We will check the pertinent lab work and do further recommendations if needed. Please make a lab only appointment for getting the lab work done.    Return in 6 weeks (on 4/5/2023), or if symptoms worsen or fail to improve, for Annual Wellness Exam.    Susanna Acosta MD  [facility] [place]   Vaughn  is a 72 year old, presenting for the following health issues:  Consult (Memory issues. )      HPI     Memory Issues, going on for about 4-5 years, getting progressively worse. Family Hx of dementia and alzheimer. Looking for referral.      Six Item Cognitive Impairment Test   (6CIT):      What year is it?                            Correct - 0 points    What month is it?                        Correct - 0 points      Give the patient an address to remember with five components:   [place] ( first and last name - 2 components)   [place] [facility]  (number and name of street - 2 components)   [place] ( city - 1 component)      About what time is it (within the hour)? Correct - 0 points    Count backwards from 20 to 1:   Correct - 0 points    Say the months of the year in reverse: Correct - 0 points    Repeat the address phrase:   1 error - 2 points    Total 6CIT Score:      2/28    Interpretation: The 6CIT uses an inverse score and questions are weighted to produce a total out of 28. Scores of 0-7 are considered normal and 8 or more significant.    Advantages The test has high sensitivity without compromising specificity even in mild dementia. It is easy to translate linguistically and culturally.  Disadvantages The main disadvantage is in the scoring and weighting of the test, which is initially confusing, however computer models have simplified this greatly.    Probability Statistics: At the 7/8 cut off: Overall figures sensitivity 90% specificity 100%, in mild dementia sensitivity = 78% , specificity = 100%    Copyright 2000 The [facility], [facility]. Courtesy of Dr. Abelardo Prakash    Last seen patient in June 2022 for earwax.  He is here on the video visit for memory issues concerns.       Allergies   Allergen Reactions     Similasan Hay Fever Relief [Cold-Eeze]      Seasonal Allergies Other (See Comments)     Rhinitis         Past Medical History:   Diagnosis Date     Hernia, abdominal       Malignant melanoma (H)      Malignant melanoma (H) 03/15/2022     Mumps      Skin cancer        Past Surgical History:   Procedure Laterality Date     BIOPSY  2012, 2015    Skin cancer     HERNIA REPAIR  1984     VASECTOMY         Family History   Problem Relation Age of Onset     Prostate Cancer Father      Colon Cancer Paternal Grandmother      Colon Cancer Other        Social History     Tobacco Use     Smoking status: Former     Packs/day: 0.50     Years: 1.00     Pack years: 0.50     Types: Cigarettes     Smokeless tobacco: Never     Tobacco comments:     1 cigareete a year with freinds but never since age of 18 yrs when he smoked for 1 year.   Substance Use Topics     Alcohol use: Yes     Comment: 6 glasses of wine per week        Current Outpatient Medications   Medication     ACE/ARB/ARNI NOT PRESCRIBED (INTENTIONAL)     Bacillus Coagulans-Inulin (PROBIOTIC) 1-250 BILLION-MG CAPS     simvastatin (ZOCOR) 40 MG tablet     No current facility-administered medications for this visit.        Review of Systems   Constitutional, HEENT, cardiovascular, pulmonary, GI, , musculoskeletal, neuro, skin, endocrine and psych systems are negative, except as otherwise noted.      Objective    Vitals:  No vitals were obtained today due to virtual visit.    Physical Exam   GENERAL: Healthy, alert and no distress  EYES: Eyes grossly normal to inspection.  No discharge or erythema, or obvious scleral/conjunctival abnormalities.  RESP: No audible wheeze, cough, or visible cyanosis.  No visible retractions or increased work of breathing.    SKIN: Visible skin clear. No significant rash, abnormal pigmentation or lesions.  NEURO: Cranial nerves grossly intact.  Mentation and speech appropriate for age.  PSYCH: Mentation appears normal, affect normal/bright, judgement and insight intact, normal speech and appearance well-groomed.        Video-Visit Details     Type of service:  Video Visit   Originating Location (pt. Location):  Home  Distant Location (provider location):  On-site  Platform used for Video Visit: Diogenes

## 2023-04-05 ENCOUNTER — OFFICE VISIT (OUTPATIENT)
Dept: FAMILY MEDICINE | Facility: CLINIC | Age: 73
End: 2023-04-05
Payer: MEDICARE

## 2023-04-05 VITALS
TEMPERATURE: 97.7 F | DIASTOLIC BLOOD PRESSURE: 80 MMHG | WEIGHT: 205.2 LBS | RESPIRATION RATE: 16 BRPM | OXYGEN SATURATION: 99 % | HEIGHT: 69 IN | SYSTOLIC BLOOD PRESSURE: 132 MMHG | BODY MASS INDEX: 30.39 KG/M2 | HEART RATE: 61 BPM

## 2023-04-05 DIAGNOSIS — E78.5 DYSLIPIDEMIA: ICD-10-CM

## 2023-04-05 DIAGNOSIS — Z23 NEED FOR PROPHYLACTIC VACCINATION AND INOCULATION AGAINST INFLUENZA: ICD-10-CM

## 2023-04-05 DIAGNOSIS — Z12.5 ENCOUNTER FOR SCREENING FOR MALIGNANT NEOPLASM OF PROSTATE: ICD-10-CM

## 2023-04-05 DIAGNOSIS — D69.6 THROMBOCYTOPENIA (H): ICD-10-CM

## 2023-04-05 DIAGNOSIS — R41.3 MEMORY DIFFICULTIES: ICD-10-CM

## 2023-04-05 DIAGNOSIS — Z00.00 ENCOUNTER FOR MEDICARE ANNUAL WELLNESS EXAM: Primary | ICD-10-CM

## 2023-04-05 DIAGNOSIS — N18.31 STAGE 3A CHRONIC KIDNEY DISEASE (H): ICD-10-CM

## 2023-04-05 DIAGNOSIS — R97.20 ELEVATED PROSTATE SPECIFIC ANTIGEN (PSA): ICD-10-CM

## 2023-04-05 DIAGNOSIS — F43.29 GRIEF REACTION WITH PROLONGED BEREAVEMENT: ICD-10-CM

## 2023-04-05 DIAGNOSIS — Z11.59 NEED FOR HEPATITIS C SCREENING TEST: ICD-10-CM

## 2023-04-05 LAB
ALBUMIN SERPL BCG-MCNC: 4.5 G/DL (ref 3.5–5.2)
ALP SERPL-CCNC: 52 U/L (ref 40–129)
ALT SERPL W P-5'-P-CCNC: 20 U/L (ref 10–50)
ANION GAP SERPL CALCULATED.3IONS-SCNC: 12 MMOL/L (ref 7–15)
AST SERPL W P-5'-P-CCNC: 32 U/L (ref 10–50)
BILIRUB SERPL-MCNC: 0.5 MG/DL
BUN SERPL-MCNC: 13.6 MG/DL (ref 8–23)
CALCIUM SERPL-MCNC: 10 MG/DL (ref 8.8–10.2)
CHLORIDE SERPL-SCNC: 102 MMOL/L (ref 98–107)
CHOLEST SERPL-MCNC: 214 MG/DL
CREAT SERPL-MCNC: 1.08 MG/DL (ref 0.67–1.17)
CREAT UR-MCNC: 136 MG/DL
DEPRECATED HCO3 PLAS-SCNC: 27 MMOL/L (ref 22–29)
ERYTHROCYTE [DISTWIDTH] IN BLOOD BY AUTOMATED COUNT: 14.1 % (ref 10–15)
GFR SERPL CREATININE-BSD FRML MDRD: 72 ML/MIN/1.73M2
GLUCOSE SERPL-MCNC: 101 MG/DL (ref 70–99)
HCT VFR BLD AUTO: 44.9 % (ref 40–53)
HDLC SERPL-MCNC: 65 MG/DL
HGB BLD-MCNC: 14.7 G/DL (ref 13.3–17.7)
LDLC SERPL CALC-MCNC: 113 MG/DL
MCH RBC QN AUTO: 29.8 PG (ref 26.5–33)
MCHC RBC AUTO-ENTMCNC: 32.7 G/DL (ref 31.5–36.5)
MCV RBC AUTO: 91 FL (ref 78–100)
MICROALBUMIN UR-MCNC: <12 MG/L
MICROALBUMIN/CREAT UR: NORMAL MG/G{CREAT}
NONHDLC SERPL-MCNC: 149 MG/DL
PLATELET # BLD AUTO: 139 10E3/UL (ref 150–450)
POTASSIUM SERPL-SCNC: 4 MMOL/L (ref 3.4–5.3)
PROT SERPL-MCNC: 7.8 G/DL (ref 6.4–8.3)
PSA SERPL DL<=0.01 NG/ML-MCNC: 3.73 NG/ML (ref 0–6.5)
RBC # BLD AUTO: 4.93 10E6/UL (ref 4.4–5.9)
SODIUM SERPL-SCNC: 141 MMOL/L (ref 136–145)
TRIGL SERPL-MCNC: 182 MG/DL
TSH SERPL DL<=0.005 MIU/L-ACNC: 2.53 UIU/ML (ref 0.3–4.2)
VIT B12 SERPL-MCNC: 406 PG/ML (ref 232–1245)
WBC # BLD AUTO: 5.3 10E3/UL (ref 4–11)

## 2023-04-05 PROCEDURE — 82043 UR ALBUMIN QUANTITATIVE: CPT | Performed by: INTERNAL MEDICINE

## 2023-04-05 PROCEDURE — 90662 IIV NO PRSV INCREASED AG IM: CPT | Performed by: INTERNAL MEDICINE

## 2023-04-05 PROCEDURE — 82570 ASSAY OF URINE CREATININE: CPT | Performed by: INTERNAL MEDICINE

## 2023-04-05 PROCEDURE — G0008 ADMIN INFLUENZA VIRUS VAC: HCPCS | Performed by: INTERNAL MEDICINE

## 2023-04-05 PROCEDURE — 80053 COMPREHEN METABOLIC PANEL: CPT | Performed by: INTERNAL MEDICINE

## 2023-04-05 PROCEDURE — 82607 VITAMIN B-12: CPT | Performed by: INTERNAL MEDICINE

## 2023-04-05 PROCEDURE — 36415 COLL VENOUS BLD VENIPUNCTURE: CPT | Performed by: INTERNAL MEDICINE

## 2023-04-05 PROCEDURE — 86803 HEPATITIS C AB TEST: CPT | Performed by: INTERNAL MEDICINE

## 2023-04-05 PROCEDURE — 84443 ASSAY THYROID STIM HORMONE: CPT | Performed by: INTERNAL MEDICINE

## 2023-04-05 PROCEDURE — 80061 LIPID PANEL: CPT | Performed by: INTERNAL MEDICINE

## 2023-04-05 PROCEDURE — G0439 PPPS, SUBSEQ VISIT: HCPCS | Performed by: INTERNAL MEDICINE

## 2023-04-05 PROCEDURE — 85027 COMPLETE CBC AUTOMATED: CPT | Performed by: INTERNAL MEDICINE

## 2023-04-05 PROCEDURE — 99214 OFFICE O/P EST MOD 30 MIN: CPT | Mod: 25 | Performed by: INTERNAL MEDICINE

## 2023-04-05 PROCEDURE — G0103 PSA SCREENING: HCPCS | Performed by: INTERNAL MEDICINE

## 2023-04-05 RX ORDER — ESCITALOPRAM OXALATE 10 MG/1
10 TABLET ORAL DAILY
Qty: 30 TABLET | Refills: 1 | Status: SHIPPED | OUTPATIENT
Start: 2023-04-05 | End: 2023-04-28

## 2023-04-05 RX ORDER — SIMVASTATIN 40 MG
40 TABLET ORAL AT BEDTIME
Qty: 90 TABLET | Refills: 3 | Status: SHIPPED | OUTPATIENT
Start: 2023-04-05 | End: 2023-04-06

## 2023-04-05 ASSESSMENT — ENCOUNTER SYMPTOMS
HEADACHES: 0
HEMATOCHEZIA: 0
MYALGIAS: 0
DIZZINESS: 0
HEARTBURN: 0
ARTHRALGIAS: 0
WEAKNESS: 0
NAUSEA: 0
ABDOMINAL PAIN: 0
SHORTNESS OF BREATH: 0
EYE PAIN: 0
DIARRHEA: 1
COUGH: 0
PARESTHESIAS: 0
CONSTIPATION: 0
HEMATURIA: 0
DYSURIA: 0
FREQUENCY: 1
JOINT SWELLING: 0
PALPITATIONS: 0
NERVOUS/ANXIOUS: 0
SORE THROAT: 0
CHILLS: 0
FEVER: 0

## 2023-04-05 ASSESSMENT — PAIN SCALES - GENERAL: PAINLEVEL: NO PAIN (0)

## 2023-04-05 ASSESSMENT — ACTIVITIES OF DAILY LIVING (ADL): CURRENT_FUNCTION: NO ASSISTANCE NEEDED

## 2023-04-05 NOTE — PATIENT INSTRUCTIONS
As discussed , please do fasting labs ordered.     Started on Lexapro low dose for your Bereavement and low mood.     Refills for other medications will be sent.     Please keep up the Neurology appointment in 8/16/2023    =========================================================    Patient Education   Personalized Prevention Plan  You are due for the preventive services outlined below.  Your care team is available to assist you in scheduling these services.  If you have already completed any of these items, please share that information with your care team to update in your medical record.  Health Maintenance Due   Topic Date Due    Kidney Microalbumin Urine Test  Never done    Hepatitis C Screening  Never done    Flu Vaccine (1) 09/01/2022    Basic Metabolic Panel  03/24/2023    Cholesterol Lab  03/24/2023    Annual Wellness Visit  03/15/2023    Hemoglobin  03/24/2023       Signs of Hearing Loss  Hearing loss is a problem shared by many people. In fact, it's one of the most common health problems, particularly as people age. Most people aged 65 and older have some hearing loss. By age 80, almost everyone does. Hearing loss often occurs slowly over the years. So, you may not realize your hearing has gotten worse.   When sudden hearing loss occurs, it's important to contact your healthcare provider right away. Your provider will do a medical exam and a hearing exam as soon as possible. This is to help find the cause and type of your sudden hearing loss. Based on your diagnosis, your healthcare provider will discuss possible treatments.      Hearing much better with one ear can be a sign of hearing loss.     Have your hearing checked  Call your healthcare provider if you:   Have to strain to hear normal conversation  Have to watch other people s faces very carefully to follow what they re saying  Need to ask people to repeat what they ve said  Often misunderstand what people are saying  Turn the volume of the  television or radio up so high that others complain  Feel that people are mumbling when they re talking to you  Find that the effort to hear leaves you feeling tired and irritated  Notice, when using the phone, that you hear better with one ear than the other  [employer] last reviewed this educational content on 6/1/2022 2000-2022 [employer]. All rights reserved. This information is not intended as a substitute for professional medical care. Always follow your healthcare professional's instructions.          Urinary Incontinence (Male)  We understand that gender is a spectrum. We may use gendered terms to talk about anatomy and health risk. Please use this sheet in a way that works best for you and your provider as you talk about your care.     Urinary incontinence means not being able to control the release of urine from the bladder.   Causes  Common causes of urinary incontinence in men include:  Infection  Certain medicines  Aging  Poor pelvic muscle tone  Bladder spasms  Obesity  Enlarged prostate  Trouble urinating and fully emptying the bladder (urinary retention)  Other things that can cause incontinence are:   Nervous system diseases  Diabetes  Sleep apnea  Urinary tract infections  Prostate surgery  Pelvic injury  Constipation and smoking have also been identified as risk factors.   Symptoms  Urge incontinence (overactive bladder). This is a sudden urge to urinate. It occurs even though there may not be much urine in the bladder. The need to urinate often during the night is common. It's due to overactive bladder muscles.  Stress incontinence. This is urine leakage that you can't control. It can occur with sneezing, coughing, and other actions that put stress on the bladder.    Treatment  Treatment depends on what is causing the condition. Bladder infections are treated with antibiotics. Urinary retention is treated with a bladder catheter.   Home care  Follow these guidelines when caring  for yourself at home:  Don't have any foods and drinks that may irritate the bladder. This includes:  Chocolate  Alcohol  Caffeine  Carbonated drinks  Acidic fruits and juices  Limit fluids to 6 to 8 cups a day.  Lose weight if you are overweight. This may reduce your symptoms.  If advised, do regular pelvic muscle-strengthening exercises such as Kegel exercises.  If needed, wear absorbent pads to catch urine. Change the pads often. This is for good hygiene and to prevent skin and bladder infections.  Bathe daily for good hygiene.  If an antibiotic was prescribed to treat a bladder infection, take it until it's finished. Keep taking it even if you are feeling better. This is to make sure your infection has cleared.  If a catheter was left in place, keep bacteria from getting into the collection bag. Don't disconnect the catheter from the collection bag.  Use a leg band to secure the catheter drainage tube, so it does not pull on the catheter. Drain the collection bag when it becomes full. To do this, use the drain spout at the bottom of the bag. Don't disconnect the bag from the catheter.  Don't pull on or try to remove a catheter. The catheter must be removed by a healthcare provider.  If you smoke, stop. Ask your provider for help if you can't do this on your own.  Follow-up care  Follow up with your healthcare provider, or as advised.  When to get medical advice  Call your healthcare provider right away if any of these occur:   Fever over 100.4 F (38 C), or as directed by your provider  Bladder pain or fullness  Belly swelling, nausea, or vomiting  Back pain  Weakness, dizziness, or fainting  If a catheter was left in place, return if:  The catheter falls out  The catheter stops draining for 6 hours  Your urine gets cloudy or smells bad  [employer] last reviewed this educational content on 6/1/2022 2000-2022 [employer]. All rights reserved. This information is not intended as a substitute for  professional medical care. Always follow your healthcare professional's instructions.

## 2023-04-05 NOTE — PROGRESS NOTES
"    The patient was provided with written information regarding signs of hearing loss.  Information on urinary incontinence and treatment options given to patient.  Answers for HPI/ROS submitted by the patient on 4/5/2023  In general, how would you rate your overall physical health?: good  Frequency of exercise:: 6-7 days/week  Do you usually eat at least 4 servings of fruit and vegetables a day, include whole grains & fiber, and avoid regularly eating high fat or \"junk\" foods? : Yes  Taking medications regularly:: Yes  Medication side effects:: Not applicable  Activities of Daily Living: no assistance needed  Home safety: throw rugs in the hallway, lack of grab bars in the bathroom  Hearing Impairment:: difficulty following dialogue in the theater, need to ask people to speak up or repeat themselves, difficulty understanding soft or whispered speech  In the past 6 months, have you been bothered by leaking of urine?: Yes  abdominal pain: No  Blood in stool: No  Blood in urine: No  chest pain: No  chills: No  congestion: No  constipation: No  cough: No  diarrhea: Yes  dizziness: No  ear pain: No  eye pain: No  nervous/anxious: No  fever: No  frequency: Yes  genital sores: No  headaches: No  hearing loss: Yes  heartburn: No  arthralgias: No  joint swelling: No  peripheral edema: No  mood changes: No  myalgias: No  nausea: No  dysuria: No  palpitations: No  Skin sensation changes: No  sore throat: No  urgency: No  rash: No  shortness of breath: No  visual disturbance: Yes  weakness: No  impotence: No  penile discharge: No  In general, how would you rate your overall mental or emotional health?: good  Additional concerns today:: No  Duration of exercise:: Greater than 60 minutes      "

## 2023-04-05 NOTE — PROGRESS NOTES
"SUBJECTIVE:   Vaughn is a 73 year old who presents for Preventive Visit.         View : No data to display.            Patient has been advised of split billing requirements and indicates understanding: Yes  Are you in the first 12 months of your Medicare coverage?  No    Healthy Habits:     In general, how would you rate your overall health?  Good    Frequency of exercise:  6-7 days/week    Duration of exercise:  Greater than 60 minutes    Do you usually eat at least 4 servings of fruit and vegetables a day, include whole grains    & fiber and avoid regularly eating high fat or \"junk\" foods?  Yes    Taking medications regularly:  Yes    Medication side effects:  Not applicable    Ability to successfully perform activities of daily living:  No assistance needed    Home Safety:  Throw rugs in the hallway and lack of grab bars in the bathroom    Hearing Impairment:  Difficulty following dialogue in the theater, need to ask people to speak up or repeat themselves and difficulty understanding soft or whispered speech    In the past 6 months, have you been bothered by leaking of urine? Yes    In general, how would you rate your overall mental or emotional health?  Good      PHQ-2 Total Score: 2    Additional concerns today:  No    Patient is having a hard time since his wife's passing 15 months ago. In an emotional state regarding that.     Have you ever done Advance Care Planning? (For example, a Health Directive, POLST, or a discussion with a medical provider or your loved ones about your wishes): Yes, advance care planning is on file.    Hearing Acuity: Patient able to converse without any difficulty.    Fall risk  Fallen 2 or more times in the past year?: No  Any fall with injury in the past year?: No    Cognitive Screening   1) Repeat 3 items (Leader, Season, Table)    2) Clock draw: NORMAL  3) 3 item recall: Recalls 2 objects   Results: NORMAL clock, 1-2 items recalled: COGNITIVE IMPAIRMENT LESS LIKELY    Mini-CogTM " Copyright RAVINDRA Pappas. Licensed by the author for use in [facility]; reprinted with permission (kendall@[facility]). All rights reserved.      Do you have sleep apnea, excessive snoring or daytime drowsiness?: no    Reviewed and updated as needed this visit by clinical staff   Tobacco  Allergies  Meds  Problems  Med Hx  Surg Hx  Fam Hx          Reviewed and updated as needed this visit by Provider   Tobacco  Allergies  Meds  Problems  Med Hx  Surg Hx  Fam Hx         Social History     Tobacco Use     Smoking status: Former     Packs/day: 0.50     Years: 1.00     Pack years: 0.50     Types: Cigarettes     Smokeless tobacco: Never     Tobacco comments:     1 cigareete a year with harlan but never since age of 18 yrs when he smoked for 1 year.   Vaping Use     Vaping status: Never Used   Substance Use Topics     Alcohol use: Yes     Comment: 6 glasses of wine per week             4/5/2023     9:05 AM   Alcohol Use   Prescreen: >3 drinks/day or >7 drinks/week? No          View : No data to display.              Do you have a current opioid prescription? No  Do you use any other controlled substances or medications that are not prescribed by a provider? None    Current providers sharing in care for this patient include:   Patient Care Team:  Susanna Acosta MD as PCP - General (Internal Medicine)  Susanna Acosta MD as Assigned PCP  Johnie Gunn MD as MD (Urology)  Annalee Spence PA-C as Physician Assistant (Dermatology)  Flores Bullard NP as Nurse Practitioner (ENT-Otolaryngology)  Johnie Gunn MD as Assigned Cancer Care Provider  Flores Bullard NP as Assigned Surgical Provider  Rafael Stuart MD as MD (Neurology)    The following health maintenance items are reviewed in Epic and correct as of today:  Health Maintenance   Topic Date Due     MICROALBUMIN  Never done     HEPATITIS C SCREENING  Never done     INFLUENZA VACCINE (1)  "09/01/2022     BMP  03/24/2023     LIPID  03/24/2023     MEDICARE ANNUAL WELLNESS VISIT  03/15/2023     ANNUAL REVIEW OF HM ORDERS  06/30/2023     FALL RISK ASSESSMENT  04/05/2024     HEMOGLOBIN  04/05/2024     ADVANCE CARE PLANNING  04/05/2028     COLORECTAL CANCER SCREENING  04/11/2028     DTAP/TDAP/TD IMMUNIZATION (4 - Td or Tdap) 02/02/2031     PHQ-2 (once per calendar year)  Completed     Pneumococcal Vaccine: 65+ Years  Completed     URINALYSIS  Completed     ZOSTER IMMUNIZATION  Completed     AORTIC ANEURYSM SCREENING (SYSTEM ASSIGNED)  Completed     COVID-19 Vaccine  Completed     IPV IMMUNIZATION  Aged Out     MENINGITIS IMMUNIZATION  Aged Out     LUNG CANCER SCREENING  Discontinued     Lab work is in process  Labs reviewed in EPIC    Review of Systems   Constitutional: Negative for chills and fever.   HENT: Positive for hearing loss. Negative for congestion, ear pain and sore throat.    Eyes: Positive for visual disturbance. Negative for pain.   Respiratory: Negative for cough and shortness of breath.    Cardiovascular: Negative for chest pain, palpitations and peripheral edema.   Gastrointestinal: Positive for diarrhea. Negative for abdominal pain, constipation, heartburn, hematochezia and nausea.   Genitourinary: Positive for frequency. Negative for dysuria, genital sores, hematuria, impotence, penile discharge and urgency.   Musculoskeletal: Negative for arthralgias, joint swelling and myalgias.   Skin: Negative for rash.   Neurological: Negative for dizziness, weakness, headaches and paresthesias.   Psychiatric/Behavioral: Negative for mood changes. The patient is not nervous/anxious.      Constitutional, HEENT, cardiovascular, pulmonary, GI, , musculoskeletal, neuro, skin, endocrine and psych systems are negative, except as otherwise noted.    OBJECTIVE:   /80   Pulse 61   Temp 97.7  F (36.5  C) (Temporal)   Resp 16   Ht 1.753 m (5' 9\")   Wt 93.1 kg (205 lb 3.2 oz)   SpO2 99%   BMI " "30.30 kg/m   Estimated body mass index is 30.3 kg/m  as calculated from the following:    Height as of this encounter: 1.753 m (5' 9\").    Weight as of this encounter: 93.1 kg (205 lb 3.2 oz).  Physical Exam  GENERAL: healthy, alert and no distress  EYES: Eyes grossly normal to inspection, PERRL and conjunctivae and sclerae normal  HENT: ear canals and TM's normal, nose and mouth without ulcers or lesions  NECK: no adenopathy, no asymmetry, masses, or scars and thyroid normal to palpation  RESP: lungs clear to auscultation - no rales, rhonchi or wheezes  CV: regular rate and rhythm, normal S1 S2, no S3 or S4, no murmur, click or rub, no peripheral edema and peripheral pulses strong  ABDOMEN: soft, nontender, no hepatosplenomegaly, no masses and bowel sounds normal  MS: no gross musculoskeletal defects noted, no edema  SKIN: no suspicious lesions or rashes  NEURO: Normal strength and tone, mentation intact and speech normal  PSYCH: mentation appears normal, affect normal/bright    Diagnostic Test Results:  Labs reviewed in Epic    ASSESSMENT / PLAN:       Assessment and Plan  1. Encounter for Medicare annual wellness exam  Last seen patient on February 2023 for virtual visit due to memory difficulties and given his family history of Alzheimer's disease we have done all the work-up including referral to neurology was given at that time.    - Patient is here for annual wellness visit, does have risk factors of malignant melanoma of the skin, following dermatology.  Stage III CKD and hyperlipidemia on simvastatin 40 mg daily.    - Last lab work in March 2022 showing normal CMP, borderline dyslipidemia with LDL at 106, thrombocytopenia with platelets remaining at baseline 134, elevated PSA with Urology referral given at that time.   - Hepatitis C Screen Reflex to HCV RNA Quant and Genotype; Future  - Lipid panel reflex to direct LDL Non-fasting; Future  - Comprehensive metabolic panel (BMP + Alb, Alk Phos, ALT, AST, " Total. Bili, TP); Future  - CBC with platelets; Future  - PSA, screen; Future  - escitalopram (LEXAPRO) 10 MG tablet; Take 1 tablet (10 mg) by mouth daily  Dispense: 30 tablet; Refill: 1    2. Thrombocytopenia (H)  Ongoing problem, emphasized to quit alcohol completely for improvement of this which she has been consuming in the past but patient is improving at this time.  He did states that he has completely cut down his alcohol consumption at this time.  Will monitor his platelet levels.  - CBC with platelets; Future    3. Stage 3a chronic kidney disease (H)  Stable, to avoid nephrotoxins.  - Albumin Random Urine Quantitative with Creat Ratio  - Comprehensive metabolic panel (BMP + Alb, Alk Phos, ALT, AST, Total. Bili, TP); Future    4. Dyslipidemia  Uncontrolled inspite of current Simvastatin 40 mg daily, changed it to Atorvastatin for improvement.   - Lipid panel reflex to direct LDL Non-fasting; Future  - atorvastatin (LIPITOR) 40 MG tablet; Take 1 tablet (40 mg) by mouth daily  Dispense: 90 tablet; Refill: 3    5. Need for hepatitis C screening test  - Hepatitis C Screen Reflex to HCV RNA Quant and Genotype; Future    6. Need for prophylactic vaccination and inoculation against influenza  - INFLUENZA, QUAD, HIGH DOSE, PF, 65YR + (FLUZONE HD)    7. Encounter for screening for malignant neoplasm of prostate  8. Elevated prostate specific antigen (PSA)  Past PSA levels was borderline elevated, has seen urology at [facility] which we do not have any records at this time.  Discussed on recheck of PSA at this time and if any worsening he will need to revisit his urologist at that time.  He might request the records and fax us at that time.  Patient understood and agreed with the plan.  - PSA, screen; Future    9. Grief reaction with prolonged bereavement  Ongoing problem, Pt wife  2021 , with fall and cardiac arrest.  Patient still has ongoing grief reaction as he remembers his wife with tearfulness at this  time.  Discussed on his ongoing worsening memory issues which could be overlapped by his grief reaction and little interest in things though he states he does not have any depression.  -Shared decision to start on Lexapro low-dose for combating his possible underlying depression overlapped by grief reaction with prolonged bereavement.  Patient understood and agreed with the plan.  - escitalopram (LEXAPRO) 10 MG tablet; Take 1 tablet (10 mg) by mouth daily  Dispense: 30 tablet; Refill: 1    10. Memory difficulties  As mentioned above on the recent video visit for his memory difficulties with upcoming neurology appointment in August 2023, patient was supposed to do this lab works ordered at that time which she could not keep up the lab appointment.  Okay to include those labs in the annual lab work today.  - Vitamin B12  - TSH with free T4 reflex         Patient Instructions     As discussed , please do fasting labs ordered.     Started on Lexapro low dose for your Bereavement and low mood.     Refills for other medications will be sent.     Please keep up the Neurology appointment in 8/16/2023    =========================================================    Patient Education  Personalized Prevention Plan  You are due for the preventive services outlined below.  Your care team is available to assist you in scheduling these services.  If you have already completed any of these items, please share that information with your care team to update in your medical record.  Health Maintenance Due   Topic Date Due     Kidney Microalbumin Urine Test  Never done     Hepatitis C Screening  Never done     Flu Vaccine (1) 09/01/2022     Basic Metabolic Panel  03/24/2023     Cholesterol Lab  03/24/2023     Annual Wellness Visit  03/15/2023     Hemoglobin  03/24/2023       Signs of Hearing Loss  Hearing loss is a problem shared by many people. In fact, it's one of the most common health problems, particularly as people age. Most  people aged 65 and older have some hearing loss. By age 80, almost everyone does. Hearing loss often occurs slowly over the years. So, you may not realize your hearing has gotten worse.   When sudden hearing loss occurs, it's important to contact your healthcare provider right away. Your provider will do a medical exam and a hearing exam as soon as possible. This is to help find the cause and type of your sudden hearing loss. Based on your diagnosis, your healthcare provider will discuss possible treatments.      Hearing much better with one ear can be a sign of hearing loss.     Have your hearing checked  Call your healthcare provider if you:     Have to strain to hear normal conversation    Have to watch other people s faces very carefully to follow what they re saying    Need to ask people to repeat what they ve said    Often misunderstand what people are saying    Turn the volume of the television or radio up so high that others complain    Feel that people are mumbling when they re talking to you    Find that the effort to hear leaves you feeling tired and irritated    Notice, when using the phone, that you hear better with one ear than the other  [facility] last reviewed this educational content on 6/1/2022 2000-2022 [employer]. All rights reserved. This information is not intended as a substitute for professional medical care. Always follow your healthcare professional's instructions.          Urinary Incontinence (Male)  We understand that gender is a spectrum. We may use gendered terms to talk about anatomy and health risk. Please use this sheet in a way that works best for you and your provider as you talk about your care.     Urinary incontinence means not being able to control the release of urine from the bladder.   Causes  Common causes of urinary incontinence in men include:    Infection    Certain medicines    Aging    Poor pelvic muscle tone    Bladder spasms    Obesity    Enlarged  prostate    Trouble urinating and fully emptying the bladder (urinary retention)  Other things that can cause incontinence are:     Nervous system diseases    Diabetes    Sleep apnea    Urinary tract infections    Prostate surgery    Pelvic injury  Constipation and smoking have also been identified as risk factors.   Symptoms    Urge incontinence (overactive bladder). This is a sudden urge to urinate. It occurs even though there may not be much urine in the bladder. The need to urinate often during the night is common. It's due to overactive bladder muscles.    Stress incontinence. This is urine leakage that you can't control. It can occur with sneezing, coughing, and other actions that put stress on the bladder.    Treatment  Treatment depends on what is causing the condition. Bladder infections are treated with antibiotics. Urinary retention is treated with a bladder catheter.   Home care  Follow these guidelines when caring for yourself at home:    Don't have any foods and drinks that may irritate the bladder. This includes:  ? Chocolate  ? Alcohol  ? Caffeine  ? Carbonated drinks  ? Acidic fruits and juices    Limit fluids to 6 to 8 cups a day.    Lose weight if you are overweight. This may reduce your symptoms.    If advised, do regular pelvic muscle-strengthening exercises such as Kegel exercises.    If needed, wear absorbent pads to catch urine. Change the pads often. This is for good hygiene and to prevent skin and bladder infections.    Bathe daily for good hygiene.    If an antibiotic was prescribed to treat a bladder infection, take it until it's finished. Keep taking it even if you are feeling better. This is to make sure your infection has cleared.    If a catheter was left in place, keep bacteria from getting into the collection bag. Don't disconnect the catheter from the collection bag.    Use a leg band to secure the catheter drainage tube, so it does not pull on the catheter. Drain the collection  "bag when it becomes full. To do this, use the drain spout at the bottom of the bag. Don't disconnect the bag from the catheter.    Don't pull on or try to remove a catheter. The catheter must be removed by a healthcare provider.    If you smoke, stop. Ask your provider for help if you can't do this on your own.  Follow-up care  Follow up with your healthcare provider, or as advised.  When to get medical advice  Call your healthcare provider right away if any of these occur:     Fever over 100.4 F (38 C), or as directed by your provider    Bladder pain or fullness    Belly swelling, nausea, or vomiting    Back pain    Weakness, dizziness, or fainting    If a catheter was left in place, return if:  ? The catheter falls out  ? The catheter stops draining for 6 hours  ? Your urine gets cloudy or smells bad  Jolie last reviewed this educational content on 6/1/2022 2000-2022 [employer]. All rights reserved. This information is not intended as a substitute for professional medical care. Always follow your healthcare professional's instructions.             Return in about 6 months (around 10/5/2023), or if symptoms worsen or fail to improve, for Follow up of last visit, If symptoms persist Video visit .    Susanna Acosta MD  [facility]      Patient has been advised of split billing requirements and indicates understanding: Yes      COUNSELING:  Reviewed preventive health counseling, as reflected in patient instructions  Special attention given to:       Regular exercise       Healthy diet/nutrition       Vision screening       Hearing screening       Dental care       Bladder control       Fall risk prevention       Immunizations    Vaccinated for: Influenza             Prostate cancer screening      BMI:   Estimated body mass index is 30.3 kg/m  as calculated from the following:    Height as of this encounter: 1.753 m (5' 9\").    Weight as of this encounter: 93.1 kg (205 " lb 3.2 oz).   Weight management plan: Discussed healthy diet and exercise guidelines      He reports that he has quit smoking. His smoking use included cigarettes. He has a 0.50 pack-year smoking history. He has never used smokeless tobacco.      Appropriate preventive services were discussed with this patient, including applicable screening as appropriate for cardiovascular disease, diabetes, osteopenia/osteoporosis, and glaucoma.  As appropriate for age/gender, discussed screening for colorectal cancer, prostate cancer, breast cancer, and cervical cancer. Checklist reviewing preventive services available has been given to the patient.    Reviewed patients plan of care and provided an AVS. The Basic Care Plan (routine screening as documented in Health Maintenance) for Richard meets the Care Plan requirement. This Care Plan has been established and reviewed with the Patient.      Susanna Acosta MD  [facility]    Identified Health Risks:    I have reviewed Opioid Use Disorder and Substance Use Disorder risk factors and made any needed referrals.

## 2023-04-06 LAB — HCV AB SERPL QL IA: NONREACTIVE

## 2023-04-06 RX ORDER — ATORVASTATIN CALCIUM 40 MG/1
40 TABLET, FILM COATED ORAL DAILY
Qty: 90 TABLET | Refills: 3 | Status: SHIPPED | OUTPATIENT
Start: 2023-04-06 | End: 2024-03-26

## 2023-04-10 ENCOUNTER — TELEPHONE (OUTPATIENT)
Dept: FAMILY MEDICINE | Facility: CLINIC | Age: 73
End: 2023-04-10
Payer: MEDICARE

## 2023-04-10 NOTE — TELEPHONE ENCOUNTER
----- Message from Susanna Acosta MD sent at 4/6/2023 11:07 PM CDT -----  Your Cholesterol is remaining abnormal inspite of current Simvastatin 40 mg daily. I have changed it to Atorvastatin for improvement.     All your OTHER labs normal, you may see some highlighted which do not have Clinical significance.    Susanna Acosta MD on 4/6/2023

## 2023-04-28 DIAGNOSIS — F43.29 GRIEF REACTION WITH PROLONGED BEREAVEMENT: ICD-10-CM

## 2023-04-28 DIAGNOSIS — Z00.00 ENCOUNTER FOR MEDICARE ANNUAL WELLNESS EXAM: ICD-10-CM

## 2023-04-28 RX ORDER — ESCITALOPRAM OXALATE 10 MG/1
10 TABLET ORAL DAILY
Qty: 90 TABLET | Refills: 1 | Status: SHIPPED | OUTPATIENT
Start: 2023-04-28 | End: 2023-09-11

## 2023-07-13 ENCOUNTER — OFFICE VISIT (OUTPATIENT)
Dept: FAMILY MEDICINE | Facility: CLINIC | Age: 73
End: 2023-07-13
Payer: MEDICARE

## 2023-07-13 DIAGNOSIS — D48.5 NEOPLASM OF UNCERTAIN BEHAVIOR OF SKIN: Primary | ICD-10-CM

## 2023-07-13 PROCEDURE — 88305 TISSUE EXAM BY PATHOLOGIST: CPT | Mod: GC | Performed by: DERMATOLOGY

## 2023-07-13 PROCEDURE — 11102 TANGNTL BX SKIN SINGLE LES: CPT | Performed by: PHYSICIAN ASSISTANT

## 2023-07-13 ASSESSMENT — PAIN SCALES - GENERAL: PAINLEVEL: NO PAIN (0)

## 2023-07-13 NOTE — LETTER
7/13/2023         RE: Richard Kitchen  [address] [place] [address]        Dear Colleague,    Thank you for referring your patient, Richard Kitchen, to the [facility]. Please see a copy of my visit note below.    [facility] Dermatology Note  Encounter Date: Jul 13, 2023  Office Visit      Dermatology Problem List:  0. NUB - L jaw line - bx -7/13/23  1. Hx of Lentigo Maligna  - R nasal ala - 2009  2. 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 AKs, s/p cryo  ____________________________________________    Assessment & Plan:  # Neoplasm of unspecified behavior of the skin (D49.2) on the L jaw line. The differential diagnosis includes SCC vs other.   - Shave biopsy performed today, see procedure note below.     Procedures Performed:   - Shave biopsy procedure note, location(s): L jaw line. After discussion of benefits and risks including but not limited to bleeding, infection, scar, incomplete removal, recurrence, and non-diagnostic biopsy, written consent and photographs were obtained. The area was cleaned with isopropyl alcohol. 0.5mL of 1% lidocaine with epinephrine was injected to obtain adequate anesthesia of lesion(s). Shave biopsy at site(s) performed. Hemostasis was achieved with aluminium chloride. Petrolatum ointment and a sterile dressing were applied. The patient tolerated the procedure and no complications were noted. The patient was provided with verbal and written post care instructions.      Follow-up: 3 month(s) in-person, or earlier for new or changing lesions    Staff:     All risks, benefits and alternatives were discussed with patient.  Patient is in agreement and understands the assessment and plan.  All questions were answered.    Annalee Spence PA-C, MPAS  [facility]: Phone:  [phone], Fax: [phone]  [facility]: Phone: [phone],  Fax: [phone]  [facility]: Phone: [phone], Fax: [phone]  ____________________________________________    CC: Derm Problem (Spot on left jawline that has not healed in 2-3 months)      HPI:  Mr. Richard Kitchen is a 73 year old male who presents today as a return patient for a spot check. Has one on the jaw line which has been present 2-3mo and has not healed. Hx of LM as well as SCC in the past. Last seen by myself on 9/12/22.    Patient is otherwise feeling well, without additional concerns.    Labs:  none    Physical Exam:  Vitals: There were no vitals taken for this visit.  SKIN: Focused examination of face and neck was performed.   - 1.5cm pink scaly lesion on the L jaw line  - No other lesions of concern on areas examined.           Medications:  Current Outpatient Medications   Medication     atorvastatin (LIPITOR) 40 MG tablet     Bacillus Coagulans-Inulin (PROBIOTIC) 1-250 BILLION-MG CAPS     ACE/ARB/ARNI NOT PRESCRIBED (INTENTIONAL)     escitalopram (LEXAPRO) 10 MG tablet     No current facility-administered medications for this visit.      Past Medical/Surgical History:   Patient Active Problem List   Diagnosis     Pure hypercholesterolemia     Sensorineural hearing loss (SNHL) of both ears     Malignant melanoma (H)     Bradycardia     Diminished hearing, bilateral     Renal insufficiency syndrome     Syncope     Elevated prostate specific antigen (PSA)     Stage 3a chronic kidney disease (H)     Need for hepatitis C screening test     Grief reaction with prolonged bereavement     Memory difficulties     Past Medical History:   Diagnosis Date     Hernia, abdominal      Malignant melanoma (H)      Malignant melanoma (H) 03/15/2022     Mumps      Skin cancer                           Again, thank you for allowing me to participate in the care of your patient.         Sincerely,        Annalee Spence PA-C

## 2023-07-13 NOTE — PROGRESS NOTES
[facility] Dermatology Note  Encounter Date: Jul 13, 2023  Office Visit      Dermatology Problem List:  0. NUB - L jaw line - bx -7/13/23  1. Hx of Lentigo Maligna  - R nasal ala - 2009  2. 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 AKs, s/p cryo  ____________________________________________    Assessment & Plan:  # Neoplasm of unspecified behavior of the skin (D49.2) on the L jaw line. The differential diagnosis includes SCC vs other.   - Shave biopsy performed today, see procedure note below.     Procedures Performed:   - Shave biopsy procedure note, location(s): L jaw line. After discussion of benefits and risks including but not limited to bleeding, infection, scar, incomplete removal, recurrence, and non-diagnostic biopsy, written consent and photographs were obtained. The area was cleaned with isopropyl alcohol. 0.5mL of 1% lidocaine with epinephrine was injected to obtain adequate anesthesia of lesion(s). Shave biopsy at site(s) performed. Hemostasis was achieved with aluminium chloride. Petrolatum ointment and a sterile dressing were applied. The patient tolerated the procedure and no complications were noted. The patient was provided with verbal and written post care instructions.      Follow-up: 3 month(s) in-person, or earlier for new or changing lesions    Staff:     All risks, benefits and alternatives were discussed with patient.  Patient is in agreement and understands the assessment and plan.  All questions were answered.    Annalee Spence PA-C, MPAS  [facility]: Phone: [phone], Fax: [phone]  [facility]: Phone: [phone],  Fax: [phone]  [facility]: Phone: [phone], Fax: [phone]  ____________________________________________    CC: Derm Problem (Spot on left  jawline that has not healed in 2-3 months)      HPI:  Mr. Richard Kitchen is a 73 year old male who presents today as a return patient for a spot check. Has one on the jaw line which has been present 2-3mo and has not healed. Hx of LM as well as SCC in the past. Last seen by myself on 9/12/22.    Patient is otherwise feeling well, without additional concerns.    Labs:  none    Physical Exam:  Vitals: There were no vitals taken for this visit.  SKIN: Focused examination of face and neck was performed.   - 1.5cm pink scaly lesion on the L jaw line  - No other lesions of concern on areas examined.           Medications:  Current Outpatient Medications   Medication     atorvastatin (LIPITOR) 40 MG tablet     Bacillus Coagulans-Inulin (PROBIOTIC) 1-250 BILLION-MG CAPS     ACE/ARB/ARNI NOT PRESCRIBED (INTENTIONAL)     escitalopram (LEXAPRO) 10 MG tablet     No current facility-administered medications for this visit.      Past Medical/Surgical History:   Patient Active Problem List   Diagnosis     Pure hypercholesterolemia     Sensorineural hearing loss (SNHL) of both ears     Malignant melanoma (H)     Bradycardia     Diminished hearing, bilateral     Renal insufficiency syndrome     Syncope     Elevated prostate specific antigen (PSA)     Stage 3a chronic kidney disease (H)     Need for hepatitis C screening test     Grief reaction with prolonged bereavement     Memory difficulties     Past Medical History:   Diagnosis Date     Hernia, abdominal      Malignant melanoma (H)      Malignant melanoma (H) 03/15/2022     Mumps      Skin cancer

## 2023-07-13 NOTE — PATIENT INSTRUCTIONS
Patient Education       Proper skin care from [facility]:    -Eliminate harsh soaps as they strip the natural oils from the skin, often resulting in dry itchy skin ( i.e. Dial, Zest, [place])  -Use mild soaps such as Cetaphil or Dove Sensitive Skin in the shower. You do not need to use soap on arms, legs, and trunk every time you shower unless visibly soiled.   -Avoid hot or cold showers.  -After showering, lightly dry off and apply moisturizing within 2-3 minutes. This will help trap moisture in the skin.   -Aggressive use of a moisturizer at least 1-2 times a day to the entire body (including -Vanicream, Cetaphil, Aquaphor or Cerave) and moisturize hands after every washing.  -We recommend using moisturizers that come in a tub that needs to be scooped out, not a pump. This has more of an oil base. It will hold moisture in your skin much better than a water base moisturizer. The above recommended are non-pore clogging.      Wear a sunscreen with at least SPF 30 on your face, ears, neck and V of the chest daily. Wear sunscreen on other areas of the body if those areas are exposed to the sun throughout the day. Sunscreens can contain physical and/or chemical blockers. Physical blockers are less likely to clog pores, these include zinc oxide and titanium dioxide. Reapply every two hour and after swimming.     Sunscreen examples: https://www.ewg.org/sunscreen/    UV radiation  UVA radiation remains constant throughout the day and throughout the year. It is a longer wavelength than UVB and therefore penetrates deeper into the skin leading to immediate and delayed tanning, photoaging, and skin cancer. 70-80% of UVA and UVB radiation occurs between the hours of 10am-2pm.  UVB radiation  UVB radiation causes the most harmful effects and is more significant during the summer months. However, snow and ice can reflect UVB radiation leading to skin damage during the winter months as well. UVB radiation is  responsible for tanning, burning, inflammation, delayed erythema (pinkness), pigmentation (brown spots), and skin cancer.     I recommend self monthly full body exams and yearly full body exams with a dermatology provider. If you develop a new or changing lesion please follow up for examination. Most skin cancers are pink and scaly or pink and pearly. However, we do see blue/brown/black skin cancers.  Consider the ABCDEs of melanoma when giving yourself your monthly full body exam ( don't forget the groin, buttocks, feet, toes, etc). A-asymmetry, B-borders, C-color, D-diameter, E-elevation or evolving. If you see any of these changes please follow up in clinic. If you cannot see your back I recommend purchasing a hand held mirror to use with a larger wall mirror.       Checking for Skin Cancer  You can find cancer early by checking your skin each month. There are 3 kinds of skin cancer. They are melanoma, basal cell carcinoma, and squamous cell carcinoma. Doing monthly skin checks is the best way to find new marks or skin changes. Follow the instructions below for checking your skin.   The ABCDEs of checking moles for melanoma   Check your moles or growths for signs of melanoma using ABCDE:   Asymmetry: the sides of the mole or growth don t match  Border: the edges are ragged, notched, or blurred  Color: the color within the mole or growth varies  Diameter: the mole or growth is larger than 6 mm (size of a pencil eraser)  Evolving: the size, shape, or color of the mole or growth is changing (evolving is not shown in the images below)    Checking for other types of skin cancer  Basal cell carcinoma or squamous cell carcinoma have symptoms such as:     A spot or mole that looks different from all other marks on your skin  Changes in how an area feels, such as itching, tenderness, or pain  Changes in the skin's surface, such as oozing, bleeding, or scaliness  A sore that does not heal  New swelling or redness beyond  the border of a mole    Who s at risk?  Anyone can get skin cancer. But you are at greater risk if you have:   Fair skin, light-colored hair, or light-colored eyes  Many moles or abnormal moles on your skin  A history of sunburns from sunlight or tanning beds  A family history of skin cancer  A history of exposure to radiation or chemicals  A weakened immune system  If you have had skin cancer in the past, you are at risk for recurring skin cancer.   How to check your skin  Do your monthly skin checkups in front of a full-length mirror. Check all parts of your body, including your:   Head (ears, face, neck, and scalp)  Torso (front, back, and sides)  Arms (tops, undersides, upper, and lower armpits)  Hands (palms, backs, and fingers, including under the nails)  Buttocks and genitals  Legs (front, back, and sides)  Feet (tops, soles, toes, including under the nails, and between toes)  If you have a lot of moles, take digital photos of them each month. Make sure to take photos both up close and from a distance. These can help you see if any moles change over time.   Most skin changes are not cancer. But if you see any changes in your skin, call your doctor right away. Only he or she can diagnose a problem. If you have skin cancer, seeing your doctor can be the first step toward getting the treatment that could save your life.   [employer] last reviewed this educational content on 4/1/2019 2000-2020 [employer]. [address] [place] [address], [place] [address]. All rights reserved. This information is not intended as a substitute for professional medical care. Always follow your healthcare professional's instructions.       When should I call my doctor?  If you are worsening or not improving, please, contact us or seek urgent care as noted below.     Who should I call with questions (adults)?  [facility] (adult and pediatric): [phone]  [facility]  [place] (adult): [phone]  [facility] ([place], [place], [place] and [place]) [phone]  For urgent needs outside of business hours call the [facility] at [phone] and ask for the dermatology resident on call to be paged  If this is a medical emergency and you are unable to reach an ER, Call 911      If you need a prescription refill, please contact your pharmacy. Refills are approved or denied by our Physicians during normal business hours, Monday through Fridays  Per office policy, refills will not be granted if you have not been seen within the past year (or sooner depending on your child's condition)

## 2023-07-14 LAB
PATH REPORT.COMMENTS IMP SPEC: ABNORMAL
PATH REPORT.COMMENTS IMP SPEC: ABNORMAL
PATH REPORT.COMMENTS IMP SPEC: YES
PATH REPORT.FINAL DX SPEC: ABNORMAL
PATH REPORT.GROSS SPEC: ABNORMAL
PATH REPORT.MICROSCOPIC SPEC OTHER STN: ABNORMAL
PATH REPORT.RELEVANT HX SPEC: ABNORMAL

## 2023-07-17 ENCOUNTER — TELEPHONE (OUTPATIENT)
Dept: FAMILY MEDICINE | Facility: CLINIC | Age: 73
End: 2023-07-17
Payer: MEDICARE

## 2023-07-17 DIAGNOSIS — C44.310 BCC (BASAL CELL CARCINOMA), FACE: Primary | ICD-10-CM

## 2023-07-17 NOTE — TELEPHONE ENCOUNTER
----- Message from Annalee Spence PA-C sent at 7/17/2023 12:32 PM CDT -----  Needs MMS, please refer to derm surg    We got your biopsy results back regarding the left jaw line. It was in fact a basal cell skin cancer. Basal Cell skin cancer is the most common type of skin cancer, and usually caused by years of sun exposure. This is a very slow growing skin cancer that rarely spreads elsewhere in the body. If left untreated it will slowly continue to grow over time and may cause issues with deformity or spread to the bone. Complete removal is recommended. 90% of people will develop this type of skin cancer in their lifetime. I will refer you to our surgeons for a procedure called MOHS surgery. One of their nurses will reach out to you to schedule this shortly. Let me know if you have any other questions.      Annalee Spence PA-C, MPAS

## 2023-07-17 NOTE — TELEPHONE ENCOUNTER
S/w pt and went over Annalee's message below.  Pt would like to go to [facility] for mohs.  Referral in chart.  Pt states understanding.    Mariela GOOD RN  [facility]  [phone]

## 2023-07-19 ENCOUNTER — TELEPHONE (OUTPATIENT)
Dept: DERMATOLOGY | Facility: CLINIC | Age: 73
End: 2023-07-19
Payer: MEDICARE

## 2023-07-19 NOTE — TELEPHONE ENCOUNTER
Left patient a voicemail to schedule a consult & mohs for Mohs BCC left jaw line with Dr. Perkins or Dr. Peace in . Provided my direct phone number.    Juli Burns on 7/19/2023 at 12:30 PM

## 2023-07-25 ENCOUNTER — TELEPHONE (OUTPATIENT)
Dept: OTOLARYNGOLOGY | Facility: CLINIC | Age: 73
End: 2023-07-25
Payer: MEDICARE

## 2023-07-25 NOTE — TELEPHONE ENCOUNTER
Called patient to schedule surgery with Dr. Peace in     Date of Surgery: 09/18    Surgery type: mohs    Consult scheduled: Yes    Has patient had mohs with us before? No    Additional comments: pt requested appt info via email. I verified the email address and sent the details to him.       Juli Burns on 7/25/2023 at 10:01 AM'

## 2023-08-15 NOTE — PROGRESS NOTES
INITIAL NEUROLOGY CONSULTATION    DATE OF VISIT: 8/16/2023  CLINIC LOCATION: [facility]  MRN: [id number]  PATIENT NAME: Richard Kitchen  YOB: 1950    REASON FOR VISIT: No chief complaint on file.    HISTORY OF PRESENT ILLNESS:                                                    Mr. Richard Kitchen is 73 year old right handed male patient with past medical history of bilateral hearing loss, malignant melanoma, chronic kidney disease, and hyperlipidemia, who was seen today for memory loss.    Per patient's report, ***.    Family history is positive for Alzheimer's disease.    Laboratory evaluation from April 2023 includes normal TSH (2.53), vitamin B12 (406), unremarkable CMP, elevated LDL (113), low platelet count of 139, and nonreactive hep C antibody.    No prior brain imaging.    No additional useful information is available in Care Everywhere, which was reviewed.  PAST MEDICAL/SURGICAL HISTORY:                                                    I personally reviewed patient's past medical and surgical history with the patient at today's visit.  MEDICATIONS:                                                    I personally reviewed patient's medications and allergies with the patient at today's visit.  ALLERGIES:                                                      Allergies   Allergen Reactions    Similasan Hay Fever Relief [Cold-Eeze]     Seasonal Allergies Other (See Comments)     Rhinitis      EXAM:                                                    VITAL SIGNS:   There were no vitals taken for this visit.  Dennis Cognitive Assessment:          Patterson Cognitive Assessment Score:   /30.     General: pt is in NAD, cooperative.  Skin: normal turgor, moist mucous membranes, no lesions/rashes noticed.  HEENT: ATNC, EOMI, PERRL, white sclera, normal conjunctiva, no nystagmus or ptosis. No carotid bruits bilaterally.  Respiratory: lung sounds clear to auscultation bilaterally, no  crackles, wheezes, rhonchi. Symmetric lung excursion, no accessory respiratory muscle use.  Cardiovascular: normal S1/S2, no murmurs/rubs/gallops.   Abdomen: Not distended.  : deferred.    Neurological:  Mental: alert, follows commands, MoCA as per above, no aphasia or dysarthria. Fund of knowledge is {MYAPPROPRIATE:605788}  Cranial Nerves:  CN II: visual acuity - able to accurately count fingers with each eye. Visual fields intact, fundi: discs sharp, no papilledema and normal vessels bilaterally.  CN III, IV, VI: EOM intact, pupils equal and reactive  CN V: facial sensation nl  CN VII: face symmetric, no facial droop  CN VIII: hearing reduced bilaterally  CN IX: palate elevation symmetric, uvula at midline  CN XI SCM normal, shoulder shrug nl  CN XII: tongue midline  Motor: Strength: 5/5 in all major groups of all extremities. Normal tone. No abnormal movements. No pronator drift b/l.  Reflexes: Triceps, biceps, brachioradialis, patellar, and achilles reflexes normal and symmetric. No clonus noted. Toes are down-going b/l.   Sensory: light touch, pinprick, and vibration intact. Romberg: negative.  Coordination: FNF and heel-shin tests intact b/l.   Gait:  Normal, able to tandem walk *** without difficulty.  DATA:   LABS/EEG/IMAGING/OTHER STUDIES: I reviewed pertinent medical records, as detailed in the history of present illness.  ASSESSMENT AND PLAN:      ASSESSMENT: Richard Kitchen is a 73 year old male patient with listed above past medical history, who presents with ***.    We had a detailed discussion with the patient regarding his presenting complaints.  The neurological exam today is ***.    DIAGNOSES:  No diagnosis found.  PLAN: There are no Patient Instructions on file for this visit.    Total Time: *** minutes spent on the date of the encounter doing chart review, history and exam, documentation and further activities per the note.    Rafael Stuart MD  [facility]  (Chart  documentation was completed in part with Dragon voice-recognition software. Even though reviewed, some grammatical, spelling, and word errors may remain.)

## 2023-08-16 ENCOUNTER — OFFICE VISIT (OUTPATIENT)
Dept: NEUROLOGY | Facility: CLINIC | Age: 73
End: 2023-08-16
Attending: INTERNAL MEDICINE
Payer: MEDICARE

## 2023-08-16 VITALS
SYSTOLIC BLOOD PRESSURE: 120 MMHG | WEIGHT: 200.3 LBS | BODY MASS INDEX: 29.67 KG/M2 | DIASTOLIC BLOOD PRESSURE: 73 MMHG | HEIGHT: 69 IN | OXYGEN SATURATION: 96 % | HEART RATE: 64 BPM

## 2023-08-16 DIAGNOSIS — R41.3 MEMORY DIFFICULTIES: Primary | ICD-10-CM

## 2023-08-16 PROCEDURE — 99205 OFFICE O/P NEW HI 60 MIN: CPT | Performed by: PSYCHIATRY & NEUROLOGY

## 2023-08-16 ASSESSMENT — MONTREAL COGNITIVE ASSESSMENT (MOCA)
12. MEMORY INDEX SCORE: 0
11. FOR EACH PAIR OF WORDS, WHAT CATEGORY DO THEY BELONG TO (OUT OF 2): 2
6. READ LIST OF DIGITS [FORWARD/BACKWARD]: 2
10. [FLUENCY] NAME WORDS STARTING WITH DESIGNATED LETTER: 0
9. REPEAT EACH SENTENCE: 2
WHAT LEVEL OF EDUCATION WAS ATTAINED: 0
13. ORIENTATION SUBSCORE: 4
8. SERIAL SUBTRACTION OF 7S: 3
WHAT IS THE TOTAL SCORE (OUT OF 30): 22
4. NAME EACH OF THE THREE ANIMALS SHOWN: 3
VISUOSPATIAL/EXECUTIVE SUBSCORE: 5
7. [VIGILENCE] TAP WHEN HEARING DESIGNATED LETTER: 1

## 2023-08-16 NOTE — PATIENT INSTRUCTIONS
AFTER VISIT SUMMARY (AVS):    At today's visit we thoroughly discussed various diagnostic possibilities for your symptoms, necessary evaluation, and the plan, which includes:  Orders Placed This Encounter   Procedures    MR Brain w/o Contrast    Vitamin B6    Vitamin B1 whole blood    Occupational Therapy Referral    Adult Neuropsychology Referral     No new medications.    Stay physically and mentally active with particular emphasis on daily mentally stimulating activities of your choice (such as crosswords, puzzles, sudoku, etc.), stretching exercises, walking, and healthy eating.     Additional recommendations after the work-up.    Next follow-up appointment is in the next 6 months or earlier if needed.    Please do not hesitate to call me with any questions or concerns.    Thanks.

## 2023-08-16 NOTE — PROGRESS NOTES
INITIAL NEUROLOGY CONSULTATION    DATE OF VISIT: 8/16/2023  CLINIC LOCATION: [facility]  MRN: [id number]  PATIENT NAME: Richard Kitchen  YOB: 1950    REASON FOR VISIT:   Chief Complaint   Patient presents with    Consult     Memory concerns      HISTORY OF PRESENT ILLNESS:                                                    Mr. Richard Kitchen is 73 year old right handed male patient with past medical history of bilateral hearing loss, malignant melanoma, chronic kidney disease, and hyperlipidemia, who was seen today for memory loss.    Per patient's report, he was told to see a neurologist by his primary care provider when they discussed his forgetfulness.  The patient feels that it started approximately a year and a half ago following the death of his wife.  It affects his daily obligations and details of the conversations/timing of events.  He is finding that he needs to pay very close attention to his calendar regarding planned events.  Also has trouble with finding words.  No issues while driving.  He does not misplaces his belongings.  No recent car accidents or tickets.  No other safety issues.  He takes his medications and pays his bills on time, though most of his payments are automatic.  He had head injury when he was 2 years old, but no additional head traumas.  No prior history of seizures, CNS infections, or strokes.  No focal neurological symptoms.    According to his son, short-term memory loss dates back to at least 8 years and is gradually getting worse.  The patient tends to repeat himself and has word finding difficulty.  He also misplaces his belongings.  Son does not have concerns with the patient's driving abilities.  He is not getting lost.  He definitely was depressed following the death of his wife, but over the last 3 to 4 months seems to be in better moods.  No significant personality changes.  No other concerns.    Family history is positive for Alzheimer's  "disease.    Laboratory evaluation from April 2023 includes normal TSH (2.53), vitamin B12 (406), unremarkable CMP, elevated LDL (113), low platelet count of 139, and non-reactive hep C antibody.    No prior brain imaging.    No additional useful information is available in Care Everywhere, which was reviewed.  PAST MEDICAL/SURGICAL HISTORY:                                                    I personally reviewed patient's past medical and surgical history with the patient at today's visit.  MEDICATIONS:                                                    I personally reviewed patient's medications and allergies with the patient at today's visit.  ALLERGIES:                                                      Allergies   Allergen Reactions    Similasan Hay Fever Relief [Cold-Eeze]     Seasonal Allergies Other (See Comments)     Rhinitis      EXAM:                                                    VITAL SIGNS:   /73 (BP Location: Right arm, Patient Position: Sitting, Cuff Size: Adult Large)   Pulse 64   Ht 1.753 m (5' 9\")   Wt 90.9 kg (200 lb 4.8 oz)   SpO2 96%   BMI 29.58 kg/m    Leicester Cognitive Assessment:    Dennis Cognitive Assessment (MOCA)  Visuospatial/Executive : 5  Naming: 3  Attention - Digits: 2  Attention - Letters: 1  Attention - Subtraction: 3  Language - Repeat: 2  Language - Fluency : 0  Abstraction: 2  Delayed Recall: 0  Orientation: 4  Education: 0  MOCA Score: 22  Administered by: : Carly ESTRELLA     Leicester Cognitive Assessment Score:  MOCA Score: 22/30.     General: pt is in NAD, cooperative.  Skin: normal turgor, moist mucous membranes, no lesions/rashes noticed.  HEENT: ATNC, EOMI, PERRL, white sclera, normal conjunctiva, no nystagmus or ptosis. No carotid bruits bilaterally.  Respiratory: lung sounds clear to auscultation bilaterally, no crackles, wheezes, rhonchi. Symmetric lung excursion, no accessory respiratory muscle use.  Cardiovascular: normal S1/S2, no murmurs/rubs/gallops. "   Abdomen: Not distended.  : deferred.    Neurological:  Mental: alert, follows commands, MoCA as per above, no aphasia or dysarthria. Fund of knowledge is appropriate for age.  Cranial Nerves:  CN II: visual acuity - able to accurately count fingers with each eye. Visual fields intact, fundi: discs sharp, no papilledema and normal vessels bilaterally.  CN III, IV, VI: EOM intact, pupils equal and reactive  CN V: facial sensation nl  CN VII: face symmetric, no facial droop  CN VIII: hearing reduced bilaterally  CN IX: palate elevation symmetric, uvula at midline  CN XI SCM normal, shoulder shrug nl  CN XII: tongue midline  Motor: Strength: 5/5 in all major groups of all extremities. Normal tone. No abnormal movements. No pronator drift b/l.  Reflexes: Triceps, biceps, brachioradialis, and patellar reflexes normal and symmetric, achilles bilaterally absent. No clonus noted. Toes are down-going b/l.   Sensory: light touch, pinprick, and vibration intact. Romberg: negative.  Coordination: FNF and heel-shin tests intact b/l.   Gait:  Normal, able to tandem walk without difficulty.  DATA:   LABS/EEG/IMAGING/OTHER STUDIES: I reviewed pertinent medical records, as detailed in the history of present illness.  ASSESSMENT AND PLAN:      ASSESSMENT: Richard Kitchen is a 73 year old male patient with listed above past medical history, who presents with gradual cognitive decline over the last several years.    We had a detailed discussion with the patient and his son regarding his presenting complaints.  My work-up today is 22/30, consistent with mild cognitive impairment.  The neurological exam today is non-focal, except absence of Achilles reflexes which could be age-related.  Recent TSH/B12 were unrevealing.    We discussed that the clinical presentation might be consistent with vitamin deficiencies, structural or vascular brain lesions, and neurodegenerative disorders as well as mental health conditions (especially in light  of his recent spouse loss).  For further diagnostic clarification, I ordered brain MRI without contrast, CPT, and neuropsychologic testing.    DIAGNOSES:    ICD-10-CM    1. Memory difficulties  R41.3 Adult Neurology  Referral        PLAN: At today's visit we thoroughly discussed various diagnostic possibilities for patient's symptoms, necessary evaluation, and the plan, which includes:  Orders Placed This Encounter   Procedures    MR Brain w/o Contrast    Vitamin B6    Vitamin B1 whole blood    Occupational Therapy Referral    Adult Neuropsychology Referral     No new medications.    I advised the patient to stay physically and mentally active with particular emphasis on daily mentally stimulating activities of his choice (such as crosswords, puzzles, sudoku, etc.), stretching exercises, walking, and healthy eating.     Additional recommendations after the work-up.    Next follow-up appointment is in the next 6 months or earlier if needed.    Total Time: 69 minutes spent on the date of the encounter doing chart review, history and exam, documentation and further activities per the note.    Rafael Stuart MD  [facility]  (Chart documentation was completed in part with Dragon voice-recognition software. Even though reviewed, some grammatical, spelling, and word errors may remain.)

## 2023-08-16 NOTE — LETTER
8/16/2023         RE: Richard Kitchen  [address] [place] [address]        Dear Colleague,    Thank you for referring your patient, Richard Kitchen, to the [facility]. Please see a copy of my visit note below.    INITIAL NEUROLOGY CONSULTATION    DATE OF VISIT: 8/16/2023  CLINIC LOCATION: [facility]  MRN: [id number]  PATIENT NAME: Richard Kitchen  YOB: 1950    REASON FOR VISIT:   Chief Complaint   Patient presents with     Consult     Memory concerns      HISTORY OF PRESENT ILLNESS:                                                    Mr. Richard Kitchen is 73 year old right handed male patient with past medical history of bilateral hearing loss, malignant melanoma, chronic kidney disease, and hyperlipidemia, who was seen today for memory loss.    Per patient's report, he was told to see a neurologist by his primary care provider when they discussed his forgetfulness.  The patient feels that it started approximately a year and a half ago following the death of his wife.  It affects his daily obligations and details of the conversations/timing of events.  He is finding that he needs to pay very close attention to his calendar regarding planned events.  Also has trouble with finding words.  No issues while driving.  He does not misplaces his belongings.  No recent car accidents or tickets.  No other safety issues.  He takes his medications and pays his bills on time, though most of his payments are automatic.  He had head injury when he was 2 years old, but no additional head traumas.  No prior history of seizures, CNS infections, or strokes.  No focal neurological symptoms.    According to his son, short-term memory loss dates back to at least 8 years and is gradually getting worse.  The patient tends to repeat himself and has word finding difficulty.  He also misplaces his belongings.  Son does not have concerns with the patient's driving  "abilities.  He is not getting lost.  He definitely was depressed following the death of his wife, but over the last 3 to 4 months seems to be in better moods.  No significant personality changes.  No other concerns.    Family history is positive for Alzheimer's disease.    Laboratory evaluation from April 2023 includes normal TSH (2.53), vitamin B12 (406), unremarkable CMP, elevated LDL (113), low platelet count of 139, and non-reactive hep C antibody.    No prior brain imaging.    No additional useful information is available in Care Everywhere, which was reviewed.  PAST MEDICAL/SURGICAL HISTORY:                                                    I personally reviewed patient's past medical and surgical history with the patient at today's visit.  MEDICATIONS:                                                    I personally reviewed patient's medications and allergies with the patient at today's visit.  ALLERGIES:                                                      Allergies   Allergen Reactions     Similasan Hay Fever Relief [Cold-Eeze]      Seasonal Allergies Other (See Comments)     Rhinitis      EXAM:                                                    VITAL SIGNS:   /73 (BP Location: Right arm, Patient Position: Sitting, Cuff Size: Adult Large)   Pulse 64   Ht 1.753 m (5' 9\")   Wt 90.9 kg (200 lb 4.8 oz)   SpO2 96%   BMI 29.58 kg/m    Dennis Cognitive Assessment:    Dennis Cognitive Assessment (MOCA)  Visuospatial/Executive : 5  Naming: 3  Attention - Digits: 2  Attention - Letters: 1  Attention - Subtraction: 3  Language - Repeat: 2  Language - Fluency : 0  Abstraction: 2  Delayed Recall: 0  Orientation: 4  Education: 0  MOCA Score: 22  Administered by: : Carly ESTRELLA     [place] Cognitive Assessment Score:  MOCA Score: 22/30.     General: pt is in NAD, cooperative.  Skin: normal turgor, moist mucous membranes, no lesions/rashes noticed.  HEENT: ATNC, EOMI, PERRL, white sclera, normal conjunctiva, no " nystagmus or ptosis. No carotid bruits bilaterally.  Respiratory: lung sounds clear to auscultation bilaterally, no crackles, wheezes, rhonchi. Symmetric lung excursion, no accessory respiratory muscle use.  Cardiovascular: normal S1/S2, no murmurs/rubs/gallops.   Abdomen: Not distended.  : deferred.    Neurological:  Mental: alert, follows commands, MoCA as per above, no aphasia or dysarthria. Fund of knowledge is appropriate for age.  Cranial Nerves:  CN II: visual acuity - able to accurately count fingers with each eye. Visual fields intact, fundi: discs sharp, no papilledema and normal vessels bilaterally.  CN III, IV, VI: EOM intact, pupils equal and reactive  CN V: facial sensation nl  CN VII: face symmetric, no facial droop  CN VIII: hearing reduced bilaterally  CN IX: palate elevation symmetric, uvula at midline  CN XI SCM normal, shoulder shrug nl  CN XII: tongue midline  Motor: Strength: 5/5 in all major groups of all extremities. Normal tone. No abnormal movements. No pronator drift b/l.  Reflexes: Triceps, biceps, brachioradialis, and patellar reflexes normal and symmetric, achilles bilaterally absent. No clonus noted. Toes are down-going b/l.   Sensory: light touch, pinprick, and vibration intact. Romberg: negative.  Coordination: FNF and heel-shin tests intact b/l.   Gait:  Normal, able to tandem walk without difficulty.  DATA:   LABS/EEG/IMAGING/OTHER STUDIES: I reviewed pertinent medical records, as detailed in the history of present illness.  ASSESSMENT AND PLAN:      ASSESSMENT: Richard Kitchen is a 73 year old male patient with listed above past medical history, who presents with gradual cognitive decline over the last several years.    We had a detailed discussion with the patient and his son regarding his presenting complaints.  My work-up today is 22/30, consistent with mild cognitive impairment.  The neurological exam today is non-focal, except absence of Achilles reflexes which could be  "age-related.  Recent TSH/B12 were unrevealing.    We discussed that the clinical presentation might be consistent with vitamin deficiencies, structural or vascular brain lesions, and neurodegenerative disorders as well as mental health conditions (especially in light of his recent spouse loss).  For further diagnostic clarification, I ordered brain MRI without contrast, CPT, and neuropsychologic testing.    DIAGNOSES:    ICD-10-CM    1. Memory difficulties  R41.3 Adult Neurology  Referral        PLAN: At today's visit we thoroughly discussed various diagnostic possibilities for patient's symptoms, necessary evaluation, and the plan, which includes:  Orders Placed This Encounter   Procedures     MR Brain w/o Contrast     Vitamin B6     Vitamin B1 whole blood     Occupational Therapy Referral     Adult Neuropsychology Referral     No new medications.    I advised the patient to stay physically and mentally active with particular emphasis on daily mentally stimulating activities of his choice (such as crosswords, puzzles, sudoku, etc.), stretching exercises, walking, and healthy eating.     Additional recommendations after the work-up.    Next follow-up appointment is in the next 6 months or earlier if needed.    Total Time: 69 minutes spent on the date of the encounter doing chart review, history and exam, documentation and further activities per the note.    Rafael Stuart MD  [facility] Neurology  (Chart documentation was completed in part with Dragon voice-recognition software. Even though reviewed, some grammatical, spelling, and word errors may remain.)          Richard Kitchen is a 73 year old male who presents for:  Chief Complaint   Patient presents with     Consult     Memory concerns         Initial Vitals:  /73 (BP Location: Right arm, Patient Position: Sitting, Cuff Size: Adult Large)   Pulse 64   Ht 1.753 m (5' 9\")   Wt 90.9 kg (200 lb 4.8 oz)   SpO2 96%   BMI 29.58 " "kg/m   Estimated body mass index is 29.58 kg/m  as calculated from the following:    Height as of this encounter: 1.753 m (5' 9\").    Weight as of this encounter: 90.9 kg (200 lb 4.8 oz).. Body surface area is 2.1 meters squared. BP completed using cuff size: large    MoCA 7.1=   22/30     Carly Arellano       Again, thank you for allowing me to participate in the care of your patient.        Sincerely,        Rafael Stuart MD  "

## 2023-08-30 ENCOUNTER — ANCILLARY PROCEDURE (OUTPATIENT)
Dept: MRI IMAGING | Facility: CLINIC | Age: 73
End: 2023-08-30
Attending: PSYCHIATRY & NEUROLOGY
Payer: MEDICARE

## 2023-08-30 DIAGNOSIS — R41.3 MEMORY DIFFICULTIES: ICD-10-CM

## 2023-08-30 PROCEDURE — G1010 CDSM STANSON: HCPCS

## 2023-09-11 ENCOUNTER — OFFICE VISIT (OUTPATIENT)
Dept: DERMATOLOGY | Facility: CLINIC | Age: 73
End: 2023-09-11
Payer: MEDICARE

## 2023-09-11 DIAGNOSIS — C44.41 BASAL CELL CARCINOMA (BCC) OF LEFT SIDE OF NECK: Primary | ICD-10-CM

## 2023-09-11 PROCEDURE — 99213 OFFICE O/P EST LOW 20 MIN: CPT | Performed by: DERMATOLOGY

## 2023-09-11 NOTE — LETTER
9/11/2023         RE: Richard Kitchen  [address] [place] [address]        Dear Colleague,    Thank you for referring your patient, Richard Kitchen, to the [facility]. Please see a copy of my visit note below.    [facility] Dermatology Note  Encounter Date: Sep 11, 2023  Office Visit     Dermatology Problem List:    # NMSC  - BCC, L jawline, s/p biopsy 7/13/23' Mohs scheduled 9/18/23  - Several Mohs in history, skin graft on R side of nose.   ____________________________________________    Assessment & Plan:     #  BCC, L jawline, s/p biopsy 7/13/23' Mohs scheduled 9/18/23  - The nature, risks, benefits, and alternatives to Mohs surgery were discussed. The patient would like to proceed with Mohs surgery.  - He does not take anticoagulants  - No indication for preop antibiotics.   - Likely repair linear.     Staff:     Alexandru Peace DO    Department of Dermatology  [facility]: Phone: [phone], Fax:[phone]  [facility]: Phone: [phone], Fax: [phone]    ____________________________________________    CC: Consult (Mohs consult for BCC on left jawline)    HPI:  Mr. Richard Kitchen is a(n) 73 year old male who presents today as a return patient for BCC of the left jawline. He's had Mohs several times before. On one the right side of his nose was repaired with a skin graft.     He does not take anticoagulants. Walks his dog 2-4 miles a day.     Patient is otherwise feeling well, without additional skin concerns.     Labs Reviewed:  Dermpath report 7/13/23 reviewed; BCC, Left jawline      Physical Exam:  Vitals: There were no vitals taken for this visit.  SKIN: Focused examination of left neck and jawline was performed.  - 1.3x1.0cm pink atrophic plaque, L jawline  - No other lesions of concern on areas examined.      Medications:  Current Outpatient Medications   Medication     ACE/ARB/ARNI NOT PRESCRIBED (INTENTIONAL)     atorvastatin (LIPITOR) 40 MG tablet     Bacillus Coagulans-Inulin (PROBIOTIC) 1-250 BILLION-MG CAPS     No current facility-administered medications for this visit.      Past Medical History:   Patient Active Problem List   Diagnosis     Pure hypercholesterolemia     Sensorineural hearing loss (SNHL) of both ears     Malignant melanoma (H)     Bradycardia     Diminished hearing, bilateral     Renal insufficiency syndrome     Syncope     Elevated prostate specific antigen (PSA)     Stage 3a chronic kidney disease (H)     Need for hepatitis C screening test     Grief reaction with prolonged bereavement     Memory difficulties     Past Medical History:   Diagnosis Date     Hernia, abdominal      Malignant melanoma (H)      Malignant melanoma (H) 03/15/2022     Mumps      Skin cancer            Again, thank you for allowing me to participate in the care of your patient.        Sincerely,        Alexandru Peace MD

## 2023-09-11 NOTE — NURSING NOTE
Richard Kitchen's goals for this visit include:   Chief Complaint   Patient presents with    Consult     Mohs consult for BCC on left jawline       He requests these members of his care team be copied on today's visit information: n/a    PCP: Susanna Acosta    Referring Provider:  No referring provider defined for this encounter.    There were no vitals taken for this visit.    Do you need any medication refills at today's visit? No  Brianna Miller RN

## 2023-09-11 NOTE — PROGRESS NOTES
[facility] Dermatology Note  Encounter Date: Sep 11, 2023  Office Visit     Dermatology Problem List:    # NMSC  - BCC, L jawline, s/p biopsy 7/13/23' Mohs scheduled 9/18/23  - Several Mohs in history, skin graft on R side of nose.   ____________________________________________    Assessment & Plan:     #  BCC, L jawline, s/p biopsy 7/13/23' Mohs scheduled 9/18/23  - The nature, risks, benefits, and alternatives to Mohs surgery were discussed. The patient would like to proceed with Mohs surgery.  - He does not take anticoagulants  - No indication for preop antibiotics.   - Likely repair linear.     Staff:     Alexandru Peace DO    Department of Dermatology  [facility]: Phone: [phone], Fax:[phone]  [facility]: Phone: [phone], Fax: [phone]    ____________________________________________    CC: Consult (Mohs consult for BCC on left jawline)    HPI:  Mr. Richard Kitchen is a(n) 73 year old male who presents today as a return patient for BCC of the left jawline. He's had Mohs several times before. On one the right side of his nose was repaired with a skin graft.     He does not take anticoagulants. Walks his dog 2-4 miles a day.     Patient is otherwise feeling well, without additional skin concerns.     Labs Reviewed:  Dermpath report 7/13/23 reviewed; BCC, Left jawline      Physical Exam:  Vitals: There were no vitals taken for this visit.  SKIN: Focused examination of left neck and jawline was performed.  - 1.3x1.0cm pink atrophic plaque, L jawline  - No other lesions of concern on areas examined.     Medications:  Current Outpatient Medications   Medication    ACE/ARB/ARNI NOT PRESCRIBED (INTENTIONAL)    atorvastatin (LIPITOR) 40 MG tablet    Bacillus Coagulans-Inulin (PROBIOTIC) 1-250 BILLION-MG CAPS     No current facility-administered medications for this  visit.      Past Medical History:   Patient Active Problem List   Diagnosis    Pure hypercholesterolemia    Sensorineural hearing loss (SNHL) of both ears    Malignant melanoma (H)    Bradycardia    Diminished hearing, bilateral    Renal insufficiency syndrome    Syncope    Elevated prostate specific antigen (PSA)    Stage 3a chronic kidney disease (H)    Need for hepatitis C screening test    Grief reaction with prolonged bereavement    Memory difficulties     Past Medical History:   Diagnosis Date    Hernia, abdominal     Malignant melanoma (H)     Malignant melanoma (H) 03/15/2022    Mumps     Skin cancer

## 2023-09-18 ENCOUNTER — OFFICE VISIT (OUTPATIENT)
Dept: DERMATOLOGY | Facility: CLINIC | Age: 73
End: 2023-09-18
Payer: MEDICARE

## 2023-09-18 VITALS — SYSTOLIC BLOOD PRESSURE: 153 MMHG | HEART RATE: 54 BPM | DIASTOLIC BLOOD PRESSURE: 81 MMHG | OXYGEN SATURATION: 98 %

## 2023-09-18 DIAGNOSIS — C44.310 BCC (BASAL CELL CARCINOMA), FACE: ICD-10-CM

## 2023-09-18 PROCEDURE — 17312 MOHS ADDL STAGE: CPT | Performed by: DERMATOLOGY

## 2023-09-18 PROCEDURE — 12053 INTMD RPR FACE/MM 5.1-7.5 CM: CPT | Performed by: DERMATOLOGY

## 2023-09-18 PROCEDURE — 17311 MOHS 1 STAGE H/N/HF/G: CPT | Performed by: DERMATOLOGY

## 2023-09-18 NOTE — NURSING NOTE
The following medication was given:     MEDICATION:  Lidocaine with epinephrine 1% 1:230029  ROUTE: SQ  SITE: see procedure note  DOSE: 15 mL  LOT #: [id number]  : [employer]  EXPIRATION DATE: 01/31/2025  NDC#: 41656-522-97  Was there drug waste? no  Multi-dose vial: Yes    Vaseline and pressure dressing applied to Mohs site on left jawline.  Wound care instructions reviewed with patient and AVS provided.  Patient verbalized understanding.  Patient will follow up for suture removal: N/A.  No further questions or concerns at this time.      Neeta Moses CMA  September 18, 2023

## 2023-09-18 NOTE — LETTER
9/18/2023         RE: Richard Kitchen  [address] [place] [address]        Dear Colleague,    Thank you for referring your patient, Richard Kitchen, to the [facility]. Please see a copy of my visit note below.    [facility] [place] Procedure Note    Dermatology Problem List:     # NMSC  - BCC, L jawline, s/p Mohs and linear repair 9/18/23  - Several Mohs in history, skin graft on R side of nose.   ___________________________________________      Date of Service:  Sep 18, 2023  Surgery: Mohs micrographic surgery    Case 1  Repair Type: intermediate  Repair Size: 6.0 cm  Suture Material: monocryl 4-0  Tumor Type: BCC - Basal cell carcinoma  Location: left jawline  Derm-Path Accession #: [id number]  PreOp Size: 1.8 x 0.9 cm  PostOp Size: 3.7 x 2.8 cm  Mohs Accession #: [id number]  Level of Defect: fascia      Procedure:  We discussed the principles of treatment and most likely complications including scarring, bleeding, infection, swelling, pain, crusting, nerve damage, large wound,  incomplete excision, wound dehiscence,  nerve damage, recurrence, and a second procedure may be recommended to obtain the best cosmetic or functional result.    Informed consent was obtained and the patient underwent the procedure as follows:  The patient was placed supine on the operating table.  The cancer was identified, outlined with a marker, and verified by the patient.  The entire surgical field was prepped with Hibiclens;Sterile saline.  The surgical site was anesthetized using Lidocaine 1% with epi 1:100,000.      The area of clinically apparent tumor was debulked. The layer of tissue was then surgically excised using a #15 blade and was then transferred onto a specimen sheet maintaining the orientation of the specimen. Hemostasis was obtained using bipolar electrocoagulation. The wound site was then covered with a dressing while the tissue samples were  processed for examination.    The excised tissue was transported to the Mohs histology laboratory maintaining the tissue orientation.  The tissue specimen was relaxed so that the entire surgical margin was in a a single horizontal plane for sectioning and inked for precise mapping.  A precise reference map was drawn to reflect the sectioning of the specimen, colored inking of the margins, and orientation on the patient.  The tissue was processed using horizontal sectioning of the base and continuous peripheral margins.  The histopathologic sections were reviewed in conjunction with the reference map.    Total blocks: 1    Total slides:  3    Residual tumor was identified and indicated in red on the reference map, identifying the location where further tissue excision was necessary. Therefore, an additional stage of Mohs Micrographic surgery was deemed necessary.     Stage II   The patient was returned to the operating room, and the area prepped in the usual manner. The residual tumor was excised using the reference map as a guide. The specimen was transfered to a labeled specimen sheet maintaining the orientation of the specimen. Hemostasis was obtained and the wound site was covered with a dressing while the tissue was processed for examination.     The excised tissue was transported to the Mohs histology laboratory maintaining orientation. The specimen margins were inked for precise mapping and a reference map was prepared for the is additional stage to maintain precise orientation as described above. The tissue was processed using horizontal sectioning of the base and continuous peripheral margins. The histopathologic sections were reviewed in conjunction with the reference map.     Total blocks: 2    Total slides:  4    There were no cancer cells visualized on examination, therefore Mohs surgery was complete.     REPAIR: An intermediate layered linear closure was selected as the procedure which would maximally  preserve both function and cosmesis.    After the excision of the tumor, the area was undermined. Hemostasis was obtained with Hyfrecation electrocoagulation.  Closure was oriented so that the wound was in the patient's natural skin tension lines. The deep subcutaneous and dermal layers were then closed with 4-0 monocryl buried vertical mattress sutures. The epidermis was then carefully approximated along the length of the wound using 5-0 Fast Absorbing Gut simple running sutures.     Estimated blood loss was less than 10 ml for all surgical sites. A sterile pressure dressing was applied and wound care instructions, with a written handout, were given. The patient was discharged from the [facility] Center alert and ambulatory.    Follow-up in PRN    Alexandru Peace DO    Department of Dermatology  [facility]: Phone: [phone], Fax:[phone]  [facility]: Phone: [phone], Fax: [phone]    Care and Laboratory Testing Performed at:  [facility]   Dermatology Clinic  [address] [place] [address]      Again, thank you for allowing me to participate in the care of your patient.        Sincerely,        Alexandru Peace MD

## 2023-09-18 NOTE — PATIENT INSTRUCTIONS
Excision/Mohs Wound Care Instructions  I will experience scar, altered skin color, bleeding, swelling, pain, crusting and redness. I may experience altered sensation. Risks are excessive bleeding, infection, muscle weakness, thick (hypertrophic or keloidal) scar, and recurrence. A second procedure may be recommended to obtain the best cosmetic or functional result.  Possible complications of any surgical procedure are bleeding, infection, scarring, alteration in skin color and sensation, muscle weakness in the area, wound dehiscence or seperation, or recurrence of the lesion or disease. On occasion, after healing, a secondary procedure or revision may be recommended in order to obtain the best cosmetic or functional result.   After your surgery, a pressure bandage will be placed over the area that has sutures. This will help prevent bleeding. Please follow these instructions as they will help you to prevent complications as your wound heals.  For the First 48 hours After Surgery:  Leave the pressure bandage on and keep it dry. If it should come loose, you may retape it, but do not take it off.  Relax and take it easy. Do not do any vigorous exercise, heavy lifting, or bending forward. This could cause the wound to bleed.  Post-operative pain is usually mild. You may alternate between 1000 mg of Tylenol (acetaminophen) and 400 mg of Ibuprofen every 4 hours.  Do not take more than 4,000mg of acetaminophen in a 24 hour period or 3200 mg of Ibuprofen in a 24 hr period.  Avoid alcohol and vitamin E as these may increase your tendency to bleed.  You may put an ice pack around the bandaged area for 20 minutes every 2-3 hours. This may help reduce swelling, bruising, and pain. Make sure the ice pack is waterproof so that the pressure bandage does not get wet.   You may see a small amount of drainage or blood on your pressure bandage. This is normal. However, if drainage or bleeding continues or saturates the bandage, you  will need to apply firm pressure over the bandage with a washcloth for 15 minutes. If bleeding continues after applying pressure for 15 minutes then go to the nearest emergency room.  48 Hours After Surgery  Carefully remove the bandage and start daily wound care and dressing changes. You may also now shower and get the wound wet.  Daily Wound Care:  Wash wound with a mild soap and water.  Use caution when washing the wound, be gentle and do not let the forceful shower stream hit the wound directly.  Pat dry.  Apply Vaseline (from a new container or tube) over the suture line with a Q-tip. It is very important to keep the wound continuously moist, as wounds heal best in a moist environment.  Keep the site covered until sutures have dissolved.  You can cover it with a Telfa (non-stick) dressing and tape or a band-aid.    If you are unable to keep wound covered, you must apply Vaseline every 2-3 hours (while awake) to ensure it is being kept moist for optimal healing. A dressing overnight is recommended to keep the area moist.  Call Us If:  You have pain that is not controlled with Tylenol/Ibuprofen  You have signs or symptoms of an infection, such as: fever over 100 degrees F, redness, warmth, or foul-smelling or yellow drainage from the wound.  Who should I call with questions?  [facility]: [phone]   [facility]: [phone]  For urgent needs outside of business hours call the [facility] at [phone] and ask to speak with the dermatology resident on call

## 2023-09-18 NOTE — NURSING NOTE
Richard Kitchen's chief complaint for this visit includes:  Chief Complaint   Patient presents with    Mohs     BCC L jawline     PCP: Susanna Acosta    Referring Provider:  Annalee Spence PA-C  [address] [place] [address]    BP (!) 153/81   Pulse 54   SpO2 98%   Data Unavailable        Allergies   Allergen Reactions    Similasan Hay Fever Relief [Cold-Eeze]     Seasonal Allergies Other (See Comments)     Rhinitis          Do you need any medication refills at today's visit? No    Neeta Moses, CMA

## 2023-09-29 ENCOUNTER — THERAPY VISIT (OUTPATIENT)
Dept: OCCUPATIONAL THERAPY | Facility: CLINIC | Age: 73
End: 2023-09-29
Attending: PSYCHIATRY & NEUROLOGY
Payer: MEDICARE

## 2023-09-29 DIAGNOSIS — Z78.9 IMPAIRED INSTRUMENTAL ACTIVITIES OF DAILY LIVING (IADL): ICD-10-CM

## 2023-09-29 DIAGNOSIS — R41.3 MEMORY DIFFICULTIES: Primary | ICD-10-CM

## 2023-09-29 PROCEDURE — 97165 OT EVAL LOW COMPLEX 30 MIN: CPT | Mod: GO | Performed by: OCCUPATIONAL THERAPIST

## 2023-09-29 PROCEDURE — 96125 COGNITIVE TEST BY HC PRO: CPT | Mod: GO | Performed by: OCCUPATIONAL THERAPIST

## 2023-09-29 NOTE — PROGRESS NOTES
"Cognitive Performance Test    Occupational Profile (including diagnosis and medical history): Richard \"Vaughn\" Fidelina is a 73 year old male presenting for OP OT evaluation at the request of Dr. Rafael Stuart MD in regards to cognitive and memory changes that have been present for several years, recommending CPT for diagnostic clarification and recommendations to increase ease and efficiency in I/ADL's.  Patient presents to evaluation with son Edinson who provides familial insight and examples as indicated.      Previous cognitive screens completed in OT or by Physician and score: 22/30 on 23     Functional History Interview:    Informants: Vaughn (patient) and adult son Edinson.       Client s perception of memory/ thinking or functional difficulties: Patient notes that he has been having word finding difficulty, more issues with names of people and repetition of stories.        Living situation: [facility], lives alone with 4 year old dog.       Home maintenance activities: Independent- denies concerns with living conditions or ability to maintain living standards.       Meal preparation and grocery shopping: Independently shops and prepares meals.  No reports of  food, forgetting to eat or difficulty with obtaining food.      Finances and paying bills: Self manages finances, no concerns with missed or late payments.       Medication management: Takes one prescribed medication consistently, no reports of missed dosages or missed managed doses.       Driving and transportation: Drives self- reports that when he is with his son, his son will likely drive.  No reports of traffic or speed violations, getting lost or confused in route to familiar places.       Leisure and routine activities: Taking care of his 4 year old hunting dog.       ADL/Mobility/Physical Functioning: No AE use at baseline.  Walks two miles per day and is independent in ADL's/selfcare.     Fall Risk Screen:   Has the patient fallen " 2 or more times in the last year? No      Has the patient fallen and had an injury in the past year? No       Timed Up and Go Score: Not able to perform due to time constraints     Is the patient a fall risk? No     Summary of Test:    Cognitive Performance Test (CPT):   The CPT is a standardized performance test that measures cognitive and problem solving skills related to everyday activities. The result of the test is a Cognitive Performance Test score, based on Paul Cognitive Levels, that is used to provide accurate and appropriate care strategies to enhance the quality of life for the individual and caregivers.     Date of Test:9/29/23    CPT Results:   5.5/6  MEDBOX: Able to correct x2/4 medications with general cue only.       5/6  SHOP: Attends to accessory prior to wallet to determine amount, needs lower priced item pointed out.  Accurately counts and provides money.       5/5  WASH: Accurately sequences task      5/5  TOAST: Asks for outlet, sequences task appropriately.       6/6  PHONE: Initiates task with phone book, correctly alphabetizing and identifying category for which to locate paint.  Dials phone and asks appropriate pricing question.       NT/5 DRESS: NT     6/6  TRAVEL: Utilizes topographical map to independently locate stairwell without need for verbal assist.  No self correction.        AVERAGE CPT SCORE: 5.4/5.6      Interpretation of Test Results:  Based on the Cognitive Performance Test, this patient scored at a CPT Level 5.0-5.5    Factors affecting performance: Impaired hearing    Interpretation based on score:   CPT 5.0 Interpretation:   Performance at this level indicates mild functional decline, start of difficulty with complex tasks. Start of memory decline, difficulty tracking details, trouble with decision making, planning, and organizing is observed. Able to learn new information.    Discharge recommendations:  Pt is likely safe to live alone.   Monitor and/or partial assistance:  managing new medications, highly complex financial arrangements such as trusts, weiner or stocks/investments.  Considerations: medication box and printed schedule can aid in compliance, cook familiar meals, microwave meals.  Explore options of automatic bill pay  Time administering test: 50 minutes  Time for interpretation and preparation of report: 35 minutes  Total time: 85 minutes    Leonor LUTZ

## 2023-10-05 NOTE — PROGRESS NOTES
[facility] [place] Procedure Note    Dermatology Problem List:     # NMSC  - BCC, L jawline, s/p Mohs and linear repair 9/18/23  - Several Mohs in history, skin graft on R side of nose.   ___________________________________________      Date of Service:  Sep 18, 2023  Surgery: Mohs micrographic surgery    Case 1  Repair Type: intermediate  Repair Size: 6.0 cm  Suture Material: monocryl 4-0  Tumor Type: BCC - Basal cell carcinoma  Location: left jawline  Derm-Path Accession #: [id number]  PreOp Size: 1.8 x 0.9 cm  PostOp Size: 3.7 x 2.8 cm  Mohs Accession #: [id number]  Level of Defect: fascia      Procedure:  We discussed the principles of treatment and most likely complications including scarring, bleeding, infection, swelling, pain, crusting, nerve damage, large wound,  incomplete excision, wound dehiscence,  nerve damage, recurrence, and a second procedure may be recommended to obtain the best cosmetic or functional result.    Informed consent was obtained and the patient underwent the procedure as follows:  The patient was placed supine on the operating table.  The cancer was identified, outlined with a marker, and verified by the patient.  The entire surgical field was prepped with Hibiclens;Sterile saline.  The surgical site was anesthetized using Lidocaine 1% with epi 1:100,000.      The area of clinically apparent tumor was debulked. The layer of tissue was then surgically excised using a #15 blade and was then transferred onto a specimen sheet maintaining the orientation of the specimen. Hemostasis was obtained using bipolar electrocoagulation. The wound site was then covered with a dressing while the tissue samples were processed for examination.    The excised tissue was transported to the Mohs histology laboratory maintaining the tissue orientation.  The tissue specimen was relaxed so that the entire surgical margin was in a a single horizontal plane for sectioning  and inked for precise mapping.  A precise reference map was drawn to reflect the sectioning of the specimen, colored inking of the margins, and orientation on the patient.  The tissue was processed using horizontal sectioning of the base and continuous peripheral margins.  The histopathologic sections were reviewed in conjunction with the reference map.    Total blocks: 1    Total slides:  3    Residual tumor was identified and indicated in red on the reference map, identifying the location where further tissue excision was necessary. Therefore, an additional stage of Mohs Micrographic surgery was deemed necessary.     Stage II   The patient was returned to the operating room, and the area prepped in the usual manner. The residual tumor was excised using the reference map as a guide. The specimen was transfered to a labeled specimen sheet maintaining the orientation of the specimen. Hemostasis was obtained and the wound site was covered with a dressing while the tissue was processed for examination.     The excised tissue was transported to the Mohs histology laboratory maintaining orientation. The specimen margins were inked for precise mapping and a reference map was prepared for the is additional stage to maintain precise orientation as described above. The tissue was processed using horizontal sectioning of the base and continuous peripheral margins. The histopathologic sections were reviewed in conjunction with the reference map.     Total blocks: 2    Total slides:  4    There were no cancer cells visualized on examination, therefore Mohs surgery was complete.     REPAIR: An intermediate layered linear closure was selected as the procedure which would maximally preserve both function and cosmesis.    After the excision of the tumor, the area was undermined. Hemostasis was obtained with Hyfrecation electrocoagulation.  Closure was oriented so that the wound was in the patient's natural skin tension lines. The  deep subcutaneous and dermal layers were then closed with 4-0 monocryl buried vertical mattress sutures. The epidermis was then carefully approximated along the length of the wound using 5-0 Fast Absorbing Gut simple running sutures.     Estimated blood loss was less than 10 ml for all surgical sites. A sterile pressure dressing was applied and wound care instructions, with a written handout, were given. The patient was discharged from the [facility] alert and ambulatory.    Follow-up in PRN    Alexandru Peace DO    Department of Dermatology  [facility]: Phone: [phone], Fax:[phone]  [facility]: Phone: [phone], Fax: [phone]    Care and Laboratory Testing Performed at:  [facility]  [address] [place] [address]

## 2023-10-20 ENCOUNTER — PATIENT OUTREACH (OUTPATIENT)
Dept: GASTROENTEROLOGY | Facility: CLINIC | Age: 73
End: 2023-10-20
Payer: MEDICARE

## 2023-11-24 ENCOUNTER — TELEPHONE (OUTPATIENT)
Dept: NEUROLOGY | Facility: CLINIC | Age: 73
End: 2023-11-24
Payer: MEDICARE

## 2023-12-07 ENCOUNTER — TELEPHONE (OUTPATIENT)
Dept: NURSING | Facility: CLINIC | Age: 73
End: 2023-12-07

## 2023-12-07 ENCOUNTER — OFFICE VISIT (OUTPATIENT)
Dept: FAMILY MEDICINE | Facility: CLINIC | Age: 73
End: 2023-12-07
Payer: MEDICARE

## 2023-12-07 DIAGNOSIS — D22.9 MULTIPLE BENIGN NEVI: Primary | ICD-10-CM

## 2023-12-07 DIAGNOSIS — L81.4 LENTIGINES: ICD-10-CM

## 2023-12-07 DIAGNOSIS — Z85.828 HISTORY OF NONMELANOMA SKIN CANCER: ICD-10-CM

## 2023-12-07 DIAGNOSIS — D18.01 CHERRY ANGIOMA: ICD-10-CM

## 2023-12-07 DIAGNOSIS — D49.2 NEOPLASM OF SKIN: ICD-10-CM

## 2023-12-07 DIAGNOSIS — L57.0 AK (ACTINIC KERATOSIS): ICD-10-CM

## 2023-12-07 DIAGNOSIS — L82.1 SEBORRHEIC KERATOSES: ICD-10-CM

## 2023-12-07 PROCEDURE — 88305 TISSUE EXAM BY PATHOLOGIST: CPT | Performed by: DERMATOLOGY

## 2023-12-07 PROCEDURE — 17000 DESTRUCT PREMALG LESION: CPT | Mod: XS | Performed by: PHYSICIAN ASSISTANT

## 2023-12-07 PROCEDURE — 99213 OFFICE O/P EST LOW 20 MIN: CPT | Mod: 25 | Performed by: PHYSICIAN ASSISTANT

## 2023-12-07 PROCEDURE — 11103 TANGNTL BX SKIN EA SEP/ADDL: CPT | Performed by: PHYSICIAN ASSISTANT

## 2023-12-07 PROCEDURE — 17003 DESTRUCT PREMALG LES 2-14: CPT | Performed by: PHYSICIAN ASSISTANT

## 2023-12-07 PROCEDURE — 11102 TANGNTL BX SKIN SINGLE LES: CPT | Performed by: PHYSICIAN ASSISTANT

## 2023-12-07 ASSESSMENT — PAIN SCALES - GENERAL: PAINLEVEL: NO PAIN (0)

## 2023-12-07 NOTE — PROGRESS NOTES
[facility] Dermatology Note  Encounter Date: Dec 7, 2023  Office Visit      Dermatology Problem List:  Last FBSC performed on 12/7/23    #NUB, Left mid back, S/p Biopsy 12/7/23  #NUB Left lateral thigh, S/p Biopsy 12/7/23  #NUB, Left scapular back. S/p Biopsy 12/7/23  Hx melanoma  - Lentigo Maligna of R ala - bx 9/18/18 - s/p MMS with skin graft (outside derm clinic)  Hx of NMSC  - BCC, L jawline, s/p Mohs and linear repair 9/18/23  - Several Mohs in history  3. AK, S/p Cryo   ____________________________________________    Assessment & Plan:  # NUB, Left mid back.  - Shave biopsy performed, see procedure note below.   - Photographed today.     #NUB, Left lateral thigh   - Shave biopsy performed, see procedure note below.   - Photographed today.     # Melanoma of face? No details  - patient will provide details when able  - if melanoma, plan on more frequent follow ups    # NUB, Left scapular back.   - Shave biopsy performed, see procedure note below.   - Photographed today.     # AK x4   - Cryotherapy performed, see procedure note below.     # History of nonmelanoma skin cancer, no clincial evidence of recurrence.   - Sunscreen: Apply 20 minutes prior to going outdoors and reapply every two hours, when wet or sweating. We recommend using an SPF 30 or higher, and to use one that is water resistant.     - Advised to monitor for changing, non-healing, bleeding, painful, changing, or otherwise symptomatic lesions  - Continue bi annual skin exams    # Benign findings: multiple benign nevi, lentigines, cherry angiomas, SKs  - edu on benign etiology  - Signs and Symptoms of non-melanoma skin cancer and ABCDEs of melanoma reviewed with patient. Patient encouraged to perform monthly self skin exams and educated on how to perform them. UV precautions reviewed with patient. Patient was asked about new or changing moles/lesions on body.   - Sunscreen: Apply 20 minutes prior to going outdoors and reapply  every two hours, when wet or sweating. We recommend using an SPF 30 or higher, and to use one that is water resistant.     - RTC for changes     Procedures Performed:   Cryotherapy procedure note: After verbal consent and discussion of risks and benefits including but no limited to dyspigmentation/scar, blister, and pain, 4was(were) treated with 1-2mm freeze border for 2 cycles with liquid nitrogen. Post cryotherapy instructions were provided.     Shave bx x3 -After discussion of benefits and risks including but not limited to bleeding, infection, scar, incomplete removal, recurrence, and non-diagnostic biopsy, written consent and photographs were obtained. The area was cleaned with isopropyl alcohol. 0.5 mL of 1% lidocaine with epinephrine was injected to obtain adequate anesthesia of the lesion on the see above. A shave biopsy was performed. Hemostasis was achieved with aluminium chloride. Vaseline and a sterile dressing were applied. The patient tolerated the procedure and no complications were noted. The patient was provided with verbal and written post care instructions.     Follow-up: pending path results    Staff and scribe     Scribe Disclosure:   I, QUIANA ALFRED, am serving as a scribe; to document services personally performed by Annalee Spence PA-C -based on data collection and the provider's statements to me.     Provider Disclosure:  I agree with above History, Review of Systems, Physical exam and Plan.  I have reviewed the content of the documentation and have edited it as needed. I have personally performed the services documented here and the documentation accurately represents those services and the decisions I have made.      Electronically signed by:     All risks, benefits and alternatives were discussed with patient.  Patient is in agreement and understands the assessment and plan.  All questions were answered.    Annalee Spence PA-C, MPAS  [facility]  [facility] [place]: Phone: [phone], Fax: [phone]  [facility]: Phone: [phone],  Fax: [phone]  [facility]: Phone: [phone], Fax: [phone]  ____________________________________________    CC: Skin Check (FBSE)    Reviewed patients past medical history and pertinent chart review prior to patient's visit today.     HPI:  Mr. Richard Kitchen is a 73 year old male who presents today as a return patient for FBSC. Hx BCC, multiple over several decades. Most recent was a BCC on the jaw line was removed via MOHS.     Patient is otherwise feeling well, without additional concerns.    Labs:  N/A    Physical Exam:  Vitals: There were no vitals taken for this visit.  SKIN: Full skin, which includes the head/face, both arms, chest, back, abdomen,both legs, genitalia and/or groin buttocks, digits and/or nails, was examined.   -  Daugherty's skin type II, has <100 nevi  - There are dome shaped bright red papules on the trunk.   - Multiple regular brown pigmented macules and papules are identified on the trunk and extremities.   - Scattered brown macules on sun exposed areas.  - There are waxy stuck on tan to brown papules on the trunk.     - There are erythematous macules with overyling adherent scale on the : Right temple x 1, Right cheek x 3, .  - Left mid back there is a 5 mm pink macule.   - Left scapular back there is a 6 pink shinny papule   - Left lateral thigh there is a 8 mm pink scaly papule.    - No other lesions of concern on areas examined.      L lateral thigh, close up    L lateral thigh              Medications:  Current Outpatient Medications   Medication    atorvastatin (LIPITOR) 40 MG tablet    Bacillus Coagulans-Inulin (PROBIOTIC) 1-250 BILLION-MG CAPS    ACE/ARB/ARNI NOT PRESCRIBED (INTENTIONAL)     No current facility-administered medications for this visit.      Past Medical/Surgical History:   Patient Active Problem List    Diagnosis    Pure hypercholesterolemia    Sensorineural hearing loss (SNHL) of both ears    Malignant melanoma (H)    Bradycardia    Diminished hearing, bilateral    Renal insufficiency syndrome    Syncope    Elevated prostate specific antigen (PSA)    Stage 3a chronic kidney disease (H)    Need for hepatitis C screening test    Grief reaction with prolonged bereavement    Memory difficulties     Past Medical History:   Diagnosis Date    Hernia, abdominal     Malignant melanoma (H)     Malignant melanoma (H) 03/15/2022    Mumps     Skin cancer

## 2023-12-07 NOTE — PATIENT INSTRUCTIONS
Wound Care After a Biopsy    What is a skin biopsy?  A skin biopsy allows the doctor to examine a very small piece of tissue under the microscope to determine the diagnosis and the best treatment for the skin condition. A local anesthetic (numbing medicine) is injected with a very small needle into the skin area to be tested. A small piece of skin is taken from the area. Sometimes a suture (stitch) is used.     What are the risks of a skin biopsy?  I will experience scar, bleeding, swelling, pain, crusting and redness. I may experience incomplete removal or recurrence. Risks of this procedure are excessive bleeding, bruising, infection, nerve damage, numbness, thick (hypertrophic or keloidal) scar and non-diagnostic biopsy.    How should I care for my wound for the first 24 hours?  Keep the wound dry and covered for 24 hours  If it bleeds, hold direct pressure on the area for 15 minutes. If bleeding does not stop, call us or go to the emergency room  Avoid strenuous exercise the first 1-2 days or as your doctor instructs you    How should I care for the wound after 24 hours?  After 24 hours, remove the bandage  You may bathe or shower as normal  If you had a scalp biopsy, you can shampoo as usual and can use shower water to clean the biopsy site daily  Clean the wound once a day with gentle soap and water  Do not scrub, be gentle  Apply white petroleum/Vaseline after cleaning the wound with a cotton swab or a clean finger, and keep the site covered with a Bandaid /bandage. Bandages are not necessary with a scalp biopsy  If you are unable to cover the site with a Bandaid /bandage, re-apply ointment 2-3 times a day to keep the site moist. Moisture will help with healing  Avoid strenuous activity for first 1-2 days  Avoid lakes, rivers, pools, and oceans until the stitches are removed or the site is healed    How do I clean my wound?  Wash hands thoroughly with soap or use hand  before all wound care  Clean  the wound with gentle soap and water  Apply white petroleum/Vaseline  to wound after it is clean  Replace the Bandaid /bandage to keep the wound covered for the first few days or as instructed by your doctor  If you had a scalp biopsy, warm shower water to the area on a daily basis should suffice    What should I use to clean my wound?   Cotton-tipped applicators (Qtips )  White petroleum jelly (Vaseline ). Use a clean new container and use Q-tips to apply.  Bandaids  as needed  Gentle soap     How should I care for my wound long term?  Do not get your wound dirty  Keep up with wound care for one week or until the area is healed.  A small scab will form and fall off by itself when the area is completely healed. The area will be red and will become pink in color as it heals. Sun protection is very important for how your scar will turn out. Sunscreen with an SPF 30 or greater is recommended once the area is healed.  You should have some soreness but it should be mild and slowly go away over several days. Talk to your doctor about using tylenol for pain,    When should I call my doctor?  If you have increased:   Pain or swelling  Pus or drainage (clear or slightly yellow drainage is ok)  Temperature over 100F  Spreading redness or warmth around wound    When will I hear about my results?  The biopsy results can take 2 weeks to come back.  Your results will automatically release to [facility] before your provider has even reviewed them.  The clinic will call you with the results, send you a [facility] message, or have you schedule a follow-up clinic or phone time to discuss the results.  Contact our clinics if you do not hear from us in 2 weeks.    Who should I call with questions?  [facility]: [phone]  [facility]: [phone]  For urgent needs outside of business hours call the [facility] at [phone] and ask for the dermatology resident on call  Patient Education       Proper skin care from [facility]:    -Eliminate harsh soaps as they strip the natural oils from the skin, often resulting in dry itchy skin ( i.e. Dial, Zest, Sinhala Spring)  -Use mild soaps such as Cetaphil or Dove Sensitive Skin in the shower. You do not need to use soap on arms, legs, and trunk every time you shower unless visibly soiled.   -Avoid hot or cold showers.  -After showering, lightly dry off and apply moisturizing within 2-3 minutes. This will help trap moisture in the skin.   -Aggressive use of a moisturizer at least 1-2 times a day to the entire body (including -Vanicream, Cetaphil, Aquaphor or Cerave) and moisturize hands after every washing.  -We recommend using moisturizers that come in a tub that needs to be scooped out, not a pump. This has more of an oil base. It will hold moisture in your skin much better than a water base moisturizer. The above recommended are non-pore clogging.      Wear a sunscreen with at least SPF 30 on your face, ears, neck and V of the chest daily. Wear sunscreen on other areas of the body if those areas are exposed to the sun throughout the day. Sunscreens can contain physical and/or chemical blockers. Physical blockers are less likely to clog pores, these include zinc oxide and titanium dioxide. Reapply every two hour and after swimming.     Sunscreen examples: https://www.ewg.org/sunscreen/    UV radiation  UVA radiation remains constant throughout the day and throughout the year. It is a longer wavelength than UVB and therefore penetrates deeper into the skin leading to immediate and delayed tanning, photoaging, and skin cancer. 70-80% of UVA and UVB radiation occurs between the hours of 10am-2pm.  UVB radiation  UVB radiation causes the most harmful effects and is more significant during the summer months. However, snow and ice can reflect UVB radiation leading to skin damage during the winter months as well. UVB radiation is  responsible for tanning, burning, inflammation, delayed erythema (pinkness), pigmentation (brown spots), and skin cancer.     I recommend self monthly full body exams and yearly full body exams with a dermatology provider. If you develop a new or changing lesion please follow up for examination. Most skin cancers are pink and scaly or pink and pearly. However, we do see blue/brown/black skin cancers.  Consider the ABCDEs of melanoma when giving yourself your monthly full body exam ( don't forget the groin, buttocks, feet, toes, etc). A-asymmetry, B-borders, C-color, D-diameter, E-elevation or evolving. If you see any of these changes please follow up in clinic. If you cannot see your back I recommend purchasing a hand held mirror to use with a larger wall mirror.       Checking for Skin Cancer  You can find cancer early by checking your skin each month. There are 3 kinds of skin cancer. They are melanoma, basal cell carcinoma, and squamous cell carcinoma. Doing monthly skin checks is the best way to find new marks or skin changes. Follow the instructions below for checking your skin.   The ABCDEs of checking moles for melanoma   Check your moles or growths for signs of melanoma using ABCDE:   Asymmetry: the sides of the mole or growth don t match  Border: the edges are ragged, notched, or blurred  Color: the color within the mole or growth varies  Diameter: the mole or growth is larger than 6 mm (size of a pencil eraser)  Evolving: the size, shape, or color of the mole or growth is changing (evolving is not shown in the images below)    Checking for other types of skin cancer  Basal cell carcinoma or squamous cell carcinoma have symptoms such as:     A spot or mole that looks different from all other marks on your skin  Changes in how an area feels, such as itching, tenderness, or pain  Changes in the skin's surface, such as oozing, bleeding, or scaliness  A sore that does not heal  New swelling or redness beyond  the border of a mole    Who s at risk?  Anyone can get skin cancer. But you are at greater risk if you have:   Fair skin, light-colored hair, or light-colored eyes  Many moles or abnormal moles on your skin  A history of sunburns from sunlight or tanning beds  A family history of skin cancer  A history of exposure to radiation or chemicals  A weakened immune system  If you have had skin cancer in the past, you are at risk for recurring skin cancer.   How to check your skin  Do your monthly skin checkups in front of a full-length mirror. Check all parts of your body, including your:   Head (ears, face, neck, and scalp)  Torso (front, back, and sides)  Arms (tops, undersides, upper, and lower armpits)  Hands (palms, backs, and fingers, including under the nails)  Buttocks and genitals  Legs (front, back, and sides)  Feet (tops, soles, toes, including under the nails, and between toes)  If you have a lot of moles, take digital photos of them each month. Make sure to take photos both up close and from a distance. These can help you see if any moles change over time.   Most skin changes are not cancer. But if you see any changes in your skin, call your doctor right away. Only he or she can diagnose a problem. If you have skin cancer, seeing your doctor can be the first step toward getting the treatment that could save your life.   [employer] last reviewed this educational content on 4/1/2019 2000-2020 [employer] [address] [place] [address], [place] [address]. All rights reserved. This information is not intended as a substitute for professional medical care. Always follow your healthcare professional's instructions.       When should I call my doctor?  If you are worsening or not improving, please, contact us or seek urgent care as noted below.     Who should I call with questions (adults)?  [facility] (adult and pediatric): [phone]  [facility]  [place] (adult): [phone]  [facility] ([place], [place], [place] and [place]) [phone]  For urgent needs outside of business hours call the [facility] at [phone] and ask for the dermatology resident on call to be paged  If this is a medical emergency and you are unable to reach an ER, Call 911      If you need a prescription refill, please contact your pharmacy. Refills are approved or denied by our Physicians during normal business hours, Monday through Fridays  Per office policy, refills will not be granted if you have not been seen within the past year (or sooner depending on your child's condition)       Cryotherapy    What is it?  Use of a very cold liquid, such as liquid nitrogen, to freeze and destroy abnormal skin cells that need to be removed    What should I expect?  Tenderness and redness  A small blister that might grow and fill with dark purple blood. There may be crusting.  More than one treatment may be needed if the lesions do not go away.    How do I care for the treated area?  Gently wash the area with your hands when bathing.  Use a thin layer of Vaseline to help with healing. You may use a Band-Aid.   The area should heal within 7-10 days and may leave behind a pink or lighter color.   Do not use an antibiotic or Neosporin ointment.   You may take acetaminophen (Tylenol) for pain.     Call your Doctor if you have:  Severe pain  Signs of infection (warmth, redness, cloudy yellow drainage, and or a bad smell)  Questions or concerns    Who should I call with questions?      [facility]: [phone]      [facility]: [phone]      For urgent needs outside of business hours call the [facility] at [phone]        and ask for the dermatology resident on call

## 2023-12-07 NOTE — TELEPHONE ENCOUNTER
----- Message from Annalee Spence PA-C sent at 12/7/2023 12:46 PM CST -----  Regarding: melanoma?  Can we call this patient and ask him about melanoma? I was digging around in h is chart and randomly saw a dx of melanoma in 2022 of the face????    He never mentioned it to me, and I didn't ask,. But I want to know if this is true of if it was misinterpreted as melanoma, but was recall a non-melanoma skin cancer. We know he has had several non melanoma skin cancers treated at outside facilities, but I need to know for sure if he really has had melanoma.     If he has any details about this I would like those faxed or provided to us somehow please.    He doesn't use [employer], so you'll have to call him.     Annalee

## 2023-12-07 NOTE — TELEPHONE ENCOUNTER
Please see path from outside lab on 09/18/2018.    Thank you,    Ursula MARSHALLRN BSN  [facility]- [phone]

## 2023-12-07 NOTE — LETTER
12/7/2023         RE: Richard Kitchen  [address] [place] [address]        Dear Colleague,    Thank you for referring your patient, Richard Kitchen, to the [facility] [place]. Please see a copy of my visit note below.    [facility] Dermatology Note  Encounter Date: Dec 7, 2023  Office Visit      Dermatology Problem List:  Last FBSC performed on 12/7/23    #NUB, Left mid back, S/p Biopsy 12/7/23  #NUB Left lateral thigh, S/p Biopsy 12/7/23  #NUB, Left scapular back. S/p Biopsy 12/7/23  0. Malignant Melanoma of face? Noted in visit with PCP in 2022? No details.  Hx of NMSC  - BCC, L jawline, s/p Mohs and linear repair 9/18/23  - Several Mohs in history, skin graft on R side of nose.   2. AK, S/p Cryo   ____________________________________________    Assessment & Plan:  # NUB, Left mid back.  - Shave biopsy performed, see procedure note below.   - Photographed today.     #NUB, Left lateral thigh   - Shave biopsy performed, see procedure note below.   - Photographed today.     # Melanoma of face? No details  - patient will provide details when able  - if melanoma, plan on more frequent follow ups    # NUB, Left scapular back.   - Shave biopsy performed, see procedure note below.   - Photographed today.     # AK x4   - Cryotherapy performed, see procedure note below.     # History of nonmelanoma skin cancer, no clincial evidence of recurrence.   - Sunscreen: Apply 20 minutes prior to going outdoors and reapply every two hours, when wet or sweating. We recommend using an SPF 30 or higher, and to use one that is water resistant.     - Advised to monitor for changing, non-healing, bleeding, painful, changing, or otherwise symptomatic lesions  - Continue bi annual skin exams    # Benign findings: multiple benign nevi, lentigines, cherry angiomas, SKs  - edu on benign etiology  - Signs and Symptoms of non-melanoma skin cancer and ABCDEs of melanoma reviewed with patient.  Patient encouraged to perform monthly self skin exams and educated on how to perform them. UV precautions reviewed with patient. Patient was asked about new or changing moles/lesions on body.   - Sunscreen: Apply 20 minutes prior to going outdoors and reapply every two hours, when wet or sweating. We recommend using an SPF 30 or higher, and to use one that is water resistant.     - RTC for changes     Procedures Performed:   Cryotherapy procedure note: After verbal consent and discussion of risks and benefits including but no limited to dyspigmentation/scar, blister, and pain, 4was(were) treated with 1-2mm freeze border for 2 cycles with liquid nitrogen. Post cryotherapy instructions were provided.     Shave bx x3 -After discussion of benefits and risks including but not limited to bleeding, infection, scar, incomplete removal, recurrence, and non-diagnostic biopsy, written consent and photographs were obtained. The area was cleaned with isopropyl alcohol. 0.5 mL of 1% lidocaine with epinephrine was injected to obtain adequate anesthesia of the lesion on the see above. A shave biopsy was performed. Hemostasis was achieved with aluminium chloride. Vaseline and a sterile dressing were applied. The patient tolerated the procedure and no complications were noted. The patient was provided with verbal and written post care instructions.     Follow-up: pending path results    Staff and scribe     Scribe Disclosure:   I, QUIANA ALFRED, am serving as a scribe; to document services personally performed by Annalee Spence PA-C -based on data collection and the provider's statements to me.     Provider Disclosure:  I agree with above History, Review of Systems, Physical exam and Plan.  I have reviewed the content of the documentation and have edited it as needed. I have personally performed the services documented here and the documentation accurately represents those services and the decisions I have made.       Electronically signed by:     All risks, benefits and alternatives were discussed with patient.  Patient is in agreement and understands the assessment and plan.  All questions were answered.    Annalee Spence PA-C, MPAS  [facility] [place]: Phone: [phone], Fax: [phone]  [facility]: Phone: [phone],  Fax: [phone]  [facility]: Phone: [phone], Fax: [phone]  ____________________________________________    CC: Skin Check (FBSE)    Reviewed patients past medical history and pertinent chart review prior to patient's visit today.     HPI:  Mr. Richard Kitchen is a 73 year old male who presents today as a return patient for FBSC. Hx BCC, multiple over several decades. Most recent was a BCC on the jaw line was removed via MOHS.     Patient is otherwise feeling well, without additional concerns.    Labs:  N/A    Physical Exam:  Vitals: There were no vitals taken for this visit.  SKIN: Full skin, which includes the head/face, both arms, chest, back, abdomen,both legs, genitalia and/or groin buttocks, digits and/or nails, was examined.   -  Daugherty's skin type II, has <100 nevi  - There are dome shaped bright red papules on the trunk.   - Multiple regular brown pigmented macules and papules are identified on the trunk and extremities.   - Scattered brown macules on sun exposed areas.  - There are waxy stuck on tan to brown papules on the trunk.     - There are erythematous macules with overyling adherent scale on the : Right temple x 1, Right cheek x 3, .  - Left mid back there is a 5 mm pink macule.   - Left scapular back there is a 6 pink shinny papule   - Left lateral thigh there is a 8 mm pink scaly papule.    - No other lesions of concern on areas examined.      L lateral thigh, close up    L lateral thigh              Medications:  Current Outpatient Medications   Medication      atorvastatin (LIPITOR) 40 MG tablet     Bacillus Coagulans-Inulin (PROBIOTIC) 1-250 BILLION-MG CAPS     ACE/ARB/ARNI NOT PRESCRIBED (INTENTIONAL)     No current facility-administered medications for this visit.      Past Medical/Surgical History:   Patient Active Problem List   Diagnosis     Pure hypercholesterolemia     Sensorineural hearing loss (SNHL) of both ears     Malignant melanoma (H)     Bradycardia     Diminished hearing, bilateral     Renal insufficiency syndrome     Syncope     Elevated prostate specific antigen (PSA)     Stage 3a chronic kidney disease (H)     Need for hepatitis C screening test     Grief reaction with prolonged bereavement     Memory difficulties     Past Medical History:   Diagnosis Date     Hernia, abdominal      Malignant melanoma (H)      Malignant melanoma (H) 03/15/2022     Mumps      Skin cancer                         Again, thank you for allowing me to participate in the care of your patient.        Sincerely,        Annalee Spence PA-C

## 2023-12-13 ENCOUNTER — TELEPHONE (OUTPATIENT)
Dept: FAMILY MEDICINE | Facility: CLINIC | Age: 73
End: 2023-12-13
Payer: MEDICARE

## 2023-12-13 DIAGNOSIS — C44.91 BASAL CELL CARCINOMA (BCC): Primary | ICD-10-CM

## 2023-12-13 NOTE — TELEPHONE ENCOUNTER
----- Message from Annalee Spence PA-C sent at 12/13/2023  2:07 PM CST -----  Site A) BCC - can do ED&C  Site C) BCC - can do ED&C    Site B - AK - can treat with LN2 when he comes in for ED&C - ok to book as 30min procedure slot for all 3 things :)    A. Left mid back:  - Basal cell carcinoma, superficial type - (see description)      B. Left lateral thigh:  - Actinic keratosis - (see description)      C. Left scapular back:  - Basal cell carcinoma, superficial and nodular types

## 2023-12-14 NOTE — TELEPHONE ENCOUNTER
Patient Contact     Attempt # 2     Was call answered?  No.  Left message on voicemail with information to call nurse back at [phone].      Mariela GOOD RN  [facility]  [phone]

## 2023-12-18 NOTE — TELEPHONE ENCOUNTER
Patient Contact    Attempt # 4    Was call answered?  No.  Left message on voicemail with information to call nurse back at [phone].     Mariela GOOD RN  [facility]  [phone]

## 2023-12-20 NOTE — TELEPHONE ENCOUNTER
Pt called back and was given Annalee's message below about biopsy results.  Nurse explained and answered all questions about ED&C procedure and scheduled for Thursday 1/18 at 2 pm at [facility].    Pt states understanding.    Mariela GOOD RN  [facility] [place]  [phone]

## 2023-12-20 NOTE — TELEPHONE ENCOUNTER
Patient Contact    Attempt # 5    Was call answered?  No.  Left message on voicemail with information to call nurse back at [phone].     S/w son Edinson and advised trying to reach pt regarding biopsy results from last week and pt is not returning calls.  Son states pt has some difficulty with technology at times.  Son took nurse's name and number [phone] and will stop by pt's home to give him the message.  Edinson states he will wait for pt to return nurse call.    Mariela GOOD RN  [facility] [place]  [phone]

## 2023-12-24 NOTE — PROGRESS NOTES
"Richard Kitchen is a 73 year old male who presents for:  Chief Complaint   Patient presents with    Consult     Memory concerns         Initial Vitals:  /73 (BP Location: Right arm, Patient Position: Sitting, Cuff Size: Adult Large)   Pulse 64   Ht 1.753 m (5' 9\")   Wt 90.9 kg (200 lb 4.8 oz)   SpO2 96%   BMI 29.58 kg/m   Estimated body mass index is 29.58 kg/m  as calculated from the following:    Height as of this encounter: 1.753 m (5' 9\").    Weight as of this encounter: 90.9 kg (200 lb 4.8 oz).. Body surface area is 2.1 meters squared. BP completed using cuff size: large    MoCA 7.1=   22/30     Carly Arellano   " Him/He

## 2024-01-18 ENCOUNTER — OFFICE VISIT (OUTPATIENT)
Dept: FAMILY MEDICINE | Facility: CLINIC | Age: 74
End: 2024-01-18
Payer: MEDICARE

## 2024-01-18 DIAGNOSIS — C44.519 BASAL CELL CARCINOMA (BCC) OF SKIN OF OTHER PART OF TORSO: ICD-10-CM

## 2024-01-18 DIAGNOSIS — L57.0 AK (ACTINIC KERATOSIS): Primary | ICD-10-CM

## 2024-01-18 PROCEDURE — 17262 DSTRJ MAL LES T/A/L 1.1-2.0: CPT | Performed by: PHYSICIAN ASSISTANT

## 2024-01-18 PROCEDURE — 17000 DESTRUCT PREMALG LESION: CPT | Mod: XS | Performed by: PHYSICIAN ASSISTANT

## 2024-01-18 NOTE — PATIENT INSTRUCTIONS
WOUND CARE INSTRUCTIONS  FOR CRYOSURGERY  For questions please call [phone]        This area treated with liquid nitrogen will form a blister. You do not need to bandage the area until after the blister forms and breaks (which may be a few days).  When the blister breaks, begin daily dressing changes as follows:    1) Clean and dry the area with tap water using clean Q-tip or sterile gauze pad.    2) Apply Vaseline or Aquaphor over entire wound. Other options include Polysporin ointment or Bacitracin ointment over entire wound.  Do NOT use Neosporin ointment.    3) Cover the wound with a band-aid or sterile non-stick gauze pad and micropore paper tape.      REPEAT THESE INSTRUCTIONS AT LEAST ONCE A DAY UNTIL THE WOUND HAS COMPLETELY HEALED.        It is an old wives tale that a wound heals better when it is exposed to air and allowed to dry out. The wound will heal faster with a better cosmetic result if it is kept moist with ointment and covered with a bandage.  Do not let the wound dry out.      Supplies Needed:     *Cotton tipped applicators (Q-tips)   *Polysporin ointment or Bacitracin ointment (NOT NEOSPORIN)   *Band-aids, or non stick gauze pads and micropore paper tape    PATIENT INFORMATION    During the healing process you will notice a number of changes. All wounds develop a small halo of redness surrounding the wound.  This means healing is occurring. Severe itching with extensive redness usually indicates sensitivity to the ointment or bandage tape used to dress the wound.  You should call our office if this develops.      Swelling and/or discoloration around your surgical site is common, particularly when performed around the eye.    All wounds normally drain.  The larger the wound the more drainage there will be.  After 7-10 days, you will notice the wound beginning to shrink and new skin will begin to grow.  The wound is healed when you can see skin has formed over the entire area.  A healed  wound has a healthy, shiny look to the surface and is red to dark pink in color to normalize.  Wounds may take approximately 4-6 weeks to heal.  Larger wounds may take 6-8 weeks.  After the wound is healed you may discontinue dressing changes.    You may experience a sensation of tightness as your wound heals. This is normal and will gradually subside.    Your healed wound may be sensitive to temperature changes. This sensitivity improves with time, but if you re having a lot of discomfort, try to avoid temperature extremes.    Patients frequently experience itching after their wound appears to have healed because of the continue healing under the skin.  Plain Vaseline will help relieve the itching.      Wound Care:  Electrodesiccation and Curettage     I will experience scar, altered skin color, bleeding, swelling, pain, crusting and redness. I may experience altered sensation. Risks are excessive bleeding, infection, muscle weakness, thick (hypertrophic or keloidal) scar, and recurrence,. A second procedure may be recommended to obtain the best cosmetic or functional result.    What is electrodesiccation and curettage ?  Scraping off tissue (curettage)  Destroy tissue using electric current or cautery (electrodessication)    How do I perform wound care?  Keep dressing in place for two days. You may shower with the dressing in place(do not get wet)  After 2 days, wash hands and remove dressing. Clean wound with cotton-swab soaked in hydrogen peroxide to remove drainage and crust  Put on a thick layer of Vaseline on the wound using a cotton-swab   Cover the wound with a Band-AidTM to protect from dust and tight clothing  If wound is draining before two days, change your dressing as described above sooner  During wound care, do not allow the area to dry out or form a scab    What do I need?  Hydrogen peroxide   Cotton-swabs   Vaseline or petroleum jelly   Band-AidsTM or dressing supplies as needed     When should I  call the doctor?  [facility]: [phone]  [facility]: [phone]  For urgent needs outside of business hours call the [facility] at [phone] and ask for the dermatology resident on call

## 2024-01-18 NOTE — PROGRESS NOTES
Dermatology Procedure Note: Electrodesiccation and Curettage  Hx melanoma  - [place] Maligna of R ala - bx 9/18/18 - s/p MMS with skin graft (outside derm clinic)  Hx of NMSC  - BCC, Left Mid back s/p ED& C 1/18/24  - BCC, L scapular back, S/p ED&C 1/18/24  - BCC, L jawline, s/p Mohs and linear repair 9/18/23  - Several Mohs in history  3. AK, S/p Cryo   - AK, bx proven - L lateral thigh S/p cryo 1/18/24    ---------------------------------------------------------------------------------------------------------------------------------    Ak, L lateral thigh  - Cryotherapy performed, see below    CRYOTHERAPY PROCEDURE NOTE: 1 lesions in the above locations were treated with liquid nitrogen utilizing a 5-10sec thaw time. Patient was advised that the treated areas will become red, swollen, may develop a blister and then should crust and peal off in the next 1-2 weeks. Post-procedure instructions were provided.     ----------------------------------------------------------------------------------------------------------------------------  PREOPERATIVE DIAGNOSIS: BCC    POSTOPERATIVE DIAGNOSIS: same    LOCATION: Mid scapular back    SIZE: original size 5 mm     Treatment options including electrodessiccation and curettage (ED and C), excision and topicals were reviewed.  The expected cure rates, healing times and anticipated scars of each option were discussed and the patient elects to proceed with ED and C.     The risks and benefits of the procedure were described to the patient.  These include but are not limited to bleeding, infection, scar, incomplete removal, and recurrence. Written informed consent was obtained. Time-out was performed. The above site was cleansed with and injected with 4mL1% lidocaine with epinephrine. Once anesthesia was obtained, the site was prepped with Alcohol. The lesion was curetted with  in 3 directions with a 5 mm margin and this was followed by electrodessication.  This process was  repeated three times. The defect measured 2 cm final size. Vaseline and a bandage were applied to the wound. The patient tolerated the procedure well and was given post care instructions.    --------------------------------------------------------------------------------------------------------------------------------------------------    PREOPERATIVE DIAGNOSIS: BCC    POSTOPERATIVE DIAGNOSIS: same    LOCATION: L mid back    SIZE: original size 6 mm    Treatment options including electrodessiccation and curettage (ED and C), excision and topicals were reviewed.  The expected cure rates, healing times and anticipated scars of each option were discussed and the patient elects to proceed with ED and C.     The risks and benefits of the procedure were described to the patient.  These include but are not limited to bleeding, infection, scar, incomplete removal, and recurrence. Written informed consent was obtained. Time-out was performed. The above site was cleansed with and injected with 4. mL1% lidocaine with epinephrine. Once anesthesia was obtained, the site was prepped with Alcohol. The lesion was curetted with  in 3 directions with a 5 mm margin and this was followed by electrodessication.  This process was repeated three times. The defect measured 2 cm final size. Vaseline and a bandage were applied to the wound. The patient tolerated the procedure well and was given post care instructions    Clinical Follow-up: PRN, within 6 months for skin check.    Annalee Spence PA-C staffed the patient.     Staff Involved:Scribe Disclosure:   I, QUIANA ALFRED, am serving as a scribe; to document services personally performed by Annalee Spence PA-C -based on data collection and the provider's statements to me.     Provider Disclosure:  I agree with above History, Review of Systems, Physical exam and Plan.  I have reviewed the content of the documentation and have edited it as needed. I have personally performed the services  documented here and the documentation accurately represents those services and the decisions I have made.      Electronically signed by:    Provider Disclosure:   The documentation recorded by the scribe accurately reflects the services I personally performed and the decisions made by me.    All risks, benefits and alternatives were discussed with patient.  Patient is in agreement and understands the assessment and plan.  All questions were answered.    Annalee Spence PA-C, MPAS  [facility] [place]: Phone: [phone], Fax: [phone]  [facility]: Phone: [phone],  Fax: [phone]  [facility]: Phone: [phone], Fax: [phone]

## 2024-01-18 NOTE — LETTER
1/18/2024         RE: Richard Kitchen  [address] [place] [address]        Dear Colleague,    Thank you for referring your patient, Richard Kitchen, to the [facility]. Please see a copy of my visit note below.    Dermatology Procedure Note: Electrodesiccation and Curettage  Hx melanoma  - [place] Maligna of R ala - bx 9/18/18 - s/p MMS with skin graft (outside derm clinic)  Hx of NMSC  - BCC, Left Mid back s/p ED& C 1/18/24  - BCC, L scapular back, S/p ED&C 1/18/24  - BCC, L jawline, s/p Mohs and linear repair 9/18/23  - Several Mohs in history  3. AK, S/p Cryo   - AK, bx proven - L lateral thigh S/p cryo 1/18/24    ---------------------------------------------------------------------------------------------------------------------------------    Ak, L lateral thigh  - Cryotherapy performed, see below    CRYOTHERAPY PROCEDURE NOTE: 1 lesions in the above locations were treated with liquid nitrogen utilizing a 5-10sec thaw time. Patient was advised that the treated areas will become red, swollen, may develop a blister and then should crust and peal off in the next 1-2 weeks. Post-procedure instructions were provided.     ----------------------------------------------------------------------------------------------------------------------------  PREOPERATIVE DIAGNOSIS: BCC    POSTOPERATIVE DIAGNOSIS: same    LOCATION: Mid scapular back    SIZE: original size 5 mm     Treatment options including electrodessiccation and curettage (ED and C), excision and topicals were reviewed.  The expected cure rates, healing times and anticipated scars of each option were discussed and the patient elects to proceed with ED and C.     The risks and benefits of the procedure were described to the patient.  These include but are not limited to bleeding, infection, scar, incomplete removal, and recurrence. Written informed consent was obtained. Time-out was performed. The above site was cleansed  with and injected with 4mL1% lidocaine with epinephrine. Once anesthesia was obtained, the site was prepped with Alcohol. The lesion was curetted with  in 3 directions with a 5 mm margin and this was followed by electrodessication.  This process was repeated three times. The defect measured 2 cm final size. Vaseline and a bandage were applied to the wound. The patient tolerated the procedure well and was given post care instructions.    --------------------------------------------------------------------------------------------------------------------------------------------------    PREOPERATIVE DIAGNOSIS: BCC    POSTOPERATIVE DIAGNOSIS: same    LOCATION: L mid back    SIZE: original size 6 mm    Treatment options including electrodessiccation and curettage (ED and C), excision and topicals were reviewed.  The expected cure rates, healing times and anticipated scars of each option were discussed and the patient elects to proceed with ED and C.     The risks and benefits of the procedure were described to the patient.  These include but are not limited to bleeding, infection, scar, incomplete removal, and recurrence. Written informed consent was obtained. Time-out was performed. The above site was cleansed with and injected with 4. mL1% lidocaine with epinephrine. Once anesthesia was obtained, the site was prepped with Alcohol. The lesion was curetted with  in 3 directions with a 5 mm margin and this was followed by electrodessication.  This process was repeated three times. The defect measured 2 cm final size. Vaseline and a bandage were applied to the wound. The patient tolerated the procedure well and was given post care instructions    Clinical Follow-up: PRN, within 6 months for skin check.    Annalee Spence PA-C staffed the patient.     Staff Involved:Scribe Disclosure:   QUIANA MCMULLEN, am serving as a scribe; to document services personally performed by Annalee Spence PA-C -based on data collection and  the provider's statements to me.     Provider Disclosure:  I agree with above History, Review of Systems, Physical exam and Plan.  I have reviewed the content of the documentation and have edited it as needed. I have personally performed the services documented here and the documentation accurately represents those services and the decisions I have made.      Electronically signed by:    Provider Disclosure:   The documentation recorded by the scribe accurately reflects the services I personally performed and the decisions made by me.    All risks, benefits and alternatives were discussed with patient.  Patient is in agreement and understands the assessment and plan.  All questions were answered.    Annalee Spence PA-C, [facility]  [facility] [place]: Phone: [phone], Fax: [phone]  [facility]: Phone: [phone],  Fax: [phone]  [facility]: Phone: [phone], Fax: [phone]      Again, thank you for allowing me to participate in the care of your patient.        Sincerely,        Annalee Spence PA-C

## 2024-03-05 ENCOUNTER — LAB (OUTPATIENT)
Dept: LAB | Facility: CLINIC | Age: 74
End: 2024-03-05
Payer: MEDICARE

## 2024-03-05 DIAGNOSIS — R41.3 MEMORY DIFFICULTIES: ICD-10-CM

## 2024-03-05 PROCEDURE — 84207 ASSAY OF VITAMIN B-6: CPT | Mod: 90

## 2024-03-05 PROCEDURE — 84425 ASSAY OF VITAMIN B-1: CPT | Mod: 90

## 2024-03-05 PROCEDURE — 36415 COLL VENOUS BLD VENIPUNCTURE: CPT

## 2024-03-05 PROCEDURE — 99000 SPECIMEN HANDLING OFFICE-LAB: CPT

## 2024-03-06 ENCOUNTER — PATIENT OUTREACH (OUTPATIENT)
Dept: CARE COORDINATION | Facility: CLINIC | Age: 74
End: 2024-03-06
Payer: MEDICARE

## 2024-03-08 LAB — PYRIDOXAL PHOS SERPL-SCNC: 53.7 NMOL/L

## 2024-03-09 LAB — VIT B1 PYROPHOSHATE BLD-SCNC: 160 NMOL/L

## 2024-03-12 ENCOUNTER — TELEPHONE (OUTPATIENT)
Dept: NEUROLOGY | Facility: CLINIC | Age: 74
End: 2024-03-12
Payer: MEDICARE

## 2024-03-13 ENCOUNTER — OFFICE VISIT (OUTPATIENT)
Dept: NEUROLOGY | Facility: CLINIC | Age: 74
End: 2024-03-13
Payer: MEDICARE

## 2024-03-13 VITALS — HEART RATE: 52 BPM | SYSTOLIC BLOOD PRESSURE: 123 MMHG | DIASTOLIC BLOOD PRESSURE: 85 MMHG | OXYGEN SATURATION: 97 %

## 2024-03-13 DIAGNOSIS — G31.84 MILD COGNITIVE IMPAIRMENT: Primary | ICD-10-CM

## 2024-03-13 PROCEDURE — G2211 COMPLEX E/M VISIT ADD ON: HCPCS | Performed by: PSYCHIATRY & NEUROLOGY

## 2024-03-13 PROCEDURE — 99215 OFFICE O/P EST HI 40 MIN: CPT | Performed by: PSYCHIATRY & NEUROLOGY

## 2024-03-13 ASSESSMENT — MONTREAL COGNITIVE ASSESSMENT (MOCA)
WHAT LEVEL OF EDUCATION WAS ATTAINED: 0
10. [FLUENCY] NAME WORDS STARTING WITH DESIGNATED LETTER: 1
8. SERIAL SUBTRACTION OF 7S: 3
VISUOSPATIAL/EXECUTIVE SUBSCORE: 5
7. [VIGILENCE] TAP WHEN HEARING DESIGNATED LETTER: 1
12. MEMORY INDEX SCORE: 0
11. FOR EACH PAIR OF WORDS, WHAT CATEGORY DO THEY BELONG TO (OUT OF 2): 2
6. READ LIST OF DIGITS [FORWARD/BACKWARD]: 2
4. NAME EACH OF THE THREE ANIMALS SHOWN: 3
13. ORIENTATION SUBSCORE: 5
9. REPEAT EACH SENTENCE: 2
WHAT IS THE TOTAL SCORE (OUT OF 30): 24

## 2024-03-13 NOTE — LETTER
3/13/2024         RE: Richard Kitchen  [address] [place] [address]        Dear Colleague,    Thank you for referring your patient, Richard Kitchen, to the [facility]. Please see a copy of my visit note below.    ESTABLISHED PATIENT NEUROLOGY NOTE    DATE OF VISIT: 3/13/2024  CLINIC LOCATION: [facility]  MRN: [id number]  PATIENT NAME: Richard Kitchen  YOB: 1950    REASON FOR VISIT:   Chief Complaint   Patient presents with     Follow Up     Memory MoCA 7.2   recs in [facility], rescheduled per pt        SUBJECTIVE:                                                      HISTORY OF PRESENT ILLNESS: Patient is here to follow up regarding mild cognitive impairment. Please refer to my initial note from 8/16/2023 for further information.  Accompanied by his son.    Since the last visit, the patient reports no significant changes in his symptoms.  Son agrees.  The patient denies interval development of new focal neurological symptoms.    Laboratory evaluation, including vitamin B1 and B6, was unrevealing.    Brain MRI/30/2023 was negative for acute intracranial pathology, but demonstrated generalized brain atrophy and presumed microvascular ischemic changes.  Images were personally reviewed and independently interpreted.    Cognitive performance test from 9/29/2023 was 5.4/5.6, consistent with mild cognitive functional disability.  It felt that the patient is likely safe to live alone, but would require assistance with complex financial arrangements and managing new medications.    Neuropsychologic testing was not done.  The patient is unsure why, but it looks like he was contacted over the phone and through [employer], and did not answer to either.  EXAM:                                                    Physical Exam:   Vitals: /85   Pulse 52   SpO2 97%   [place] Cognitive Assessment:    Dennis Cognitive Assessment  (MOCA)  Visuospatial/Executive : 5  Naming: 3  Attention - Digits: 2  Attention - Letters: 1  Attention - Subtraction: 3  Language - Repeat: 2  Language - Fluency : 1  Abstraction: 2  Delayed Recall: 0  Orientation: 5  Education: 0  MOCA Score: 24     Dennis Cognitive Assessment Score:  MOCA Score: 24/30.   General: pt is in NAD, cooperative.  Skin: normal turgor, moist mucous membranes, no lesions/rashes noticed.  HEENT: ATNC, white sclera, normal conjunctiva.  Respiratory: Symmetric lung excursion, no accessory respiratory muscle use.  Abdomen: Non distended.  Neurological: awake, cooperative, follows commands, no exam changes compared to the initial visit.  ASSESSMENT AND PLAN:                                                    Assessment: 73 year old male patient presents for follow-up of mild cognitive impairment.  He reports that his symptoms are stable.  Laboratory evaluation was unrevealing.  Brain MRI demonstrated mild age-related changes.  We reviewed images and results.  CPT was mildly abnormal.  Reviewed.  Neuropsychologic testing was not performed. MoCA was 24/30 (stable), compared to initial visit (22/30).  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  Patient's son will help the patient to arrange neuropsychologic testing.  The patient and his son had many appropriate questions regarding the differential diagnosis, prognosis, and available treatment options, that we discussed in details.    Diagnoses:    ICD-10-CM    1. Mild cognitive impairment  G31.84         Plan: At today's visit we thoroughly discussed current symptoms, evaluation results, and the plan.    Encouraged the patient to complete neuropsychologic testing.  The contact information to schedule was provided.    Next follow-up appointment is in the next 6 months or  earlier if needed.    Total Time: 41 minutes spent on the date of the encounter doing chart review, history and exam, documentation and further activities per the note.  Additional time was needed to discuss results and additional workup with the patient as well as answer numerous questions that he and his son had.    Rafael Stuart MD  [facility] Neurology  (Chart documentation was completed in part with Dragon voice-recognition software. Even though reviewed, some grammatical, spelling, and word errors may remain.)      Again, thank you for allowing me to participate in the care of your patient.        Sincerely,        Rafael Stuart MD

## 2024-03-13 NOTE — PROGRESS NOTES
ESTABLISHED PATIENT NEUROLOGY NOTE    DATE OF VISIT: 3/13/2024  CLINIC LOCATION: [facility]  MRN: [id number]  PATIENT NAME: Richard Kitchen  YOB: 1950    REASON FOR VISIT:   Chief Complaint   Patient presents with    Follow Up     Memory MoCA 7.2   recs in [facility], rescheduled per pt        SUBJECTIVE:                                                      HISTORY OF PRESENT ILLNESS: Patient is here to follow up regarding mild cognitive impairment. Please refer to my initial note from 8/16/2023 for further information.  Accompanied by his son.    Since the last visit, the patient reports no significant changes in his symptoms.  Son agrees.  The patient denies interval development of new focal neurological symptoms.    Laboratory evaluation, including vitamin B1 and B6, was unrevealing.    Brain MRI/30/2023 was negative for acute intracranial pathology, but demonstrated generalized brain atrophy and presumed microvascular ischemic changes.  Images were personally reviewed and independently interpreted.    Cognitive performance test from 9/29/2023 was 5.4/5.6, consistent with mild cognitive functional disability.  It felt that the patient is likely safe to live alone, but would require assistance with complex financial arrangements and managing new medications.    Neuropsychologic testing was not done.  The patient is unsure why, but it looks like he was contacted over the phone and through [employer], and did not answer to either.  EXAM:                                                    Physical Exam:   Vitals: /85   Pulse 52   SpO2 97%   [place] Cognitive Assessment:    [place] Cognitive Assessment (MOCA)  Visuospatial/Executive : 5  Naming: 3  Attention - Digits: 2  Attention - Letters: 1  Attention - Subtraction: 3  Language - Repeat: 2  Language - Fluency : 1  Abstraction: 2  Delayed Recall: 0  Orientation: 5  Education: 0  MOCA Score: 24     Dennis Cognitive Assessment  Score:  MOCA Score: 24/30.   General: pt is in NAD, cooperative.  Skin: normal turgor, moist mucous membranes, no lesions/rashes noticed.  HEENT: ATNC, white sclera, normal conjunctiva.  Respiratory: Symmetric lung excursion, no accessory respiratory muscle use.  Abdomen: Non distended.  Neurological: awake, cooperative, follows commands, no exam changes compared to the initial visit.  ASSESSMENT AND PLAN:                                                    Assessment: 73 year old male patient presents for follow-up of mild cognitive impairment.  He reports that his symptoms are stable.  Laboratory evaluation was unrevealing.  Brain MRI demonstrated mild age-related changes.  We reviewed images and results.  CPT was mildly abnormal.  Reviewed.  Neuropsychologic testing was not performed. MoCA was 24/30 (stable), compared to initial visit (22/30).  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  Patient's son will help the patient to arrange neuropsychologic testing.  The patient and his son had many appropriate questions regarding the differential diagnosis, prognosis, and available treatment options, that we discussed in details.    Diagnoses:    ICD-10-CM    1. Mild cognitive impairment  G31.84         Plan: At today's visit we thoroughly discussed current symptoms, evaluation results, and the plan.    Encouraged the patient to complete neuropsychologic testing.  The contact information to schedule was provided.    Next follow-up appointment is in the next 6 months or earlier if needed.    Total Time: 41 minutes spent on the date of the encounter doing chart review, history and exam, documentation and further activities per the note.  Additional time was needed to discuss results and additional workup with the patient as well as answer numerous  questions that he and his son had.    Rafael Stuart MD  [facility] Neurology  (Chart documentation was completed in part with Dragon voice-recognition software. Even though reviewed, some grammatical, spelling, and word errors may remain.)

## 2024-03-13 NOTE — PATIENT INSTRUCTIONS
AFTER VISIT SUMMARY (AVS):    At today's visit we thoroughly discussed current symptoms, evaluation results, and the plan.    Please complete neuropsychologic testing.  The contact information was provided.    Next follow-up appointment is in the next 6 months or earlier if needed.    Please do not hesitate to call me with any questions or concerns.    Thanks.

## 2024-03-13 NOTE — NURSING NOTE
"Richard Kitchen is a 73 year old male who presents for:  Chief Complaint   Patient presents with    Follow Up     Memory MoCA 7.2   recs in epic, rescheduled per pt           Initial Vitals:  /85   Pulse 52   SpO2 97%  Estimated body mass index is 29.58 kg/m  as calculated from the following:    Height as of 8/16/23: 1.753 m (5' 9\").    Weight as of 8/16/23: 90.9 kg (200 lb 4.8 oz).. There is no height or weight on file to calculate BSA. BP completed using cuff size: large  Data Unavailable    Nursing Comments:     Irma Garvin MA    "

## 2024-03-14 ENCOUNTER — TELEPHONE (OUTPATIENT)
Dept: NEUROLOGY | Facility: CLINIC | Age: 74
End: 2024-03-14
Payer: MEDICARE

## 2024-03-14 DIAGNOSIS — R41.3 MEMORY DIFFICULTIES: Primary | ICD-10-CM

## 2024-03-14 NOTE — TELEPHONE ENCOUNTER
Called and LVM for [place] letting him know that updated referral was placed.     Nandini JONES RN, BSN  [facility] Neurology

## 2024-03-14 NOTE — TELEPHONE ENCOUNTER
Health Call Center    Phone Message    May a detailed message be left on voicemail: yes     Reason for Call:  Edinson patients son called, please extend neuro psych orders out. There are no more appointments in this calendar year. Edinson will call back to reschedule appt with provider once neuro psych is scheduled.     Action Taken: Other: cs neurology    Travel Screening: Not Applicable

## 2024-03-15 ENCOUNTER — TELEPHONE (OUTPATIENT)
Dept: NEUROPSYCHOLOGY | Facility: CLINIC | Age: 74
End: 2024-03-15
Payer: MEDICARE

## 2024-03-15 ENCOUNTER — TELEPHONE (OUTPATIENT)
Dept: NEUROPSYCHOLOGY | Facility: CLINIC | Age: 74
End: 2024-03-15

## 2024-03-15 NOTE — TELEPHONE ENCOUNTER
Hello,    Please schedule patient per the following protocol:    Timeline: First Available  Location: ANY LOCATION  Preferred Provider: ANY PROVIDER  If Preferred Provider is not available:   Visit Type: NEUROPSYCH EVAL or Neuropsych Interview Only (testing scheduled shortly after interview)  Special Instructions:    Maren Javier  Psychometrist

## 2024-03-15 NOTE — TELEPHONE ENCOUNTER
Sent [employer] (1st Attempt) for the patient to call back and schedule the following:    Appointment type: Neuropsych Interview Only  Provider: Any  Return date: First Avail  Specialty phone number: [phone]  Additional appointment(s) needed: NA  Additonal Notes: MADDIE Talavera or MADDIE Walden most likely providers to have openings. Testing portion to be scheduled shortly after completion of NP Interview      Jennifer Carter on 3/15/2024 at 2:25 PM

## 2024-03-15 NOTE — TELEPHONE ENCOUNTER
[facility] Call Center    Phone Message    May a detailed message be left on voicemail: yes     Reason for Call: Appointment Intake    Referring Provider Name:     Rafael Stuart MD     Diagnosis and/or Symptoms: Memory difficulties    Action Taken: Other: Routed to [facility] Neuro[facility]ology [facility]    Travel Screening: Not Applicable      Please review and advise on scheduling, patient is being referred to Dr. Funes

## 2024-03-18 ENCOUNTER — TELEPHONE (OUTPATIENT)
Dept: NEUROPSYCHOLOGY | Facility: CLINIC | Age: 74
End: 2024-03-18
Payer: MEDICARE

## 2024-03-18 NOTE — TELEPHONE ENCOUNTER
Sent [employer] (2nd Attempt) for the patient to call back and schedule the following:    Timeline: First Available  Location: ANY LOCATION  Preferred Provider: ANY PROVIDER  If Preferred Provider is not available:   Visit Type: NEUROPSYCH EVAL or Neuropsych Interview Only (testing scheduled shortly after interview)  Special Instructions: Please disregard previous encounter 3/15. Patient can do either Neuropsych Eval or Neuropsych Interview Only, up to his preference.      Voicemail box not set up yet. Unable to LVM. Sent [employer] 2nd attempt.    Pierre Loza on 3/18/2024 at 10:24 AM

## 2024-03-18 NOTE — TELEPHONE ENCOUNTER
Called 2x, unable to LVM both times due to voicemail box not set up. Sent [employer] 2x.    Pierre Loza on 3/18/2024 at 10:24 AM

## 2024-03-20 ENCOUNTER — PATIENT OUTREACH (OUTPATIENT)
Dept: CARE COORDINATION | Facility: CLINIC | Age: 74
End: 2024-03-20
Payer: MEDICARE

## 2024-03-26 DIAGNOSIS — E78.5 DYSLIPIDEMIA: ICD-10-CM

## 2024-03-26 RX ORDER — ATORVASTATIN CALCIUM 40 MG/1
40 TABLET, FILM COATED ORAL DAILY
Qty: 90 TABLET | Refills: 0 | Status: SHIPPED | OUTPATIENT
Start: 2024-03-26 | End: 2024-05-07

## 2024-04-02 ENCOUNTER — ALLIED HEALTH/NURSE VISIT (OUTPATIENT)
Dept: FAMILY MEDICINE | Facility: CLINIC | Age: 74
End: 2024-04-02
Payer: MEDICARE

## 2024-04-02 DIAGNOSIS — Z48.00 CHANGE OF DRESSING: Primary | ICD-10-CM

## 2024-04-02 PROCEDURE — 99207 PR NO CHARGE NURSE ONLY: CPT | Performed by: PHYSICIAN ASSISTANT

## 2024-04-02 NOTE — NURSING NOTE
Patient walked into clinic today to have lesion on hand checked and 2 areas that were ED& C'd at last visit.  Appointment scheduled with dermatology provider this Thursday to check the lesion on the back of his hand.  Added to the nurse only schedule to nelida at post ED&C sites on upper shoulder.    Patient seemed very confused and asked multiple times what he was here for. Advised he walked in asking for an appointment for a spot on his hand and to check the healing    Patient walked into a room- turned around and walked right back out asking what he was there for.    I explained again that he came in asking to be evaluated.    I examined patients back- he has two well healing reddened circular scars on his shoulder. I advised patient that they look great and it can take 1-1.5 years for a scar to fully heal- it will lighten as time goes on.    Patient verbalized understanding.    He then walked to the check in area and asked what his appointment was for on Thursday- we again explained that he wanted the provider to check his hand lesion.    Car was given with appointment information.    Routing to Dr. Mcelroy to advise on patients increasing confusion and memory loss.    Ursula MARSHALL,RN BSN  Patient Care Supervisor  [facility]- [phone]

## 2024-04-04 ENCOUNTER — OFFICE VISIT (OUTPATIENT)
Dept: FAMILY MEDICINE | Facility: CLINIC | Age: 74
End: 2024-04-04
Payer: MEDICARE

## 2024-04-04 DIAGNOSIS — L57.0 ACTINIC KERATOSES: Primary | ICD-10-CM

## 2024-04-04 PROCEDURE — 17000 DESTRUCT PREMALG LESION: CPT | Performed by: PHYSICIAN ASSISTANT

## 2024-04-04 ASSESSMENT — PAIN SCALES - GENERAL: PAINLEVEL: NO PAIN (0)

## 2024-04-04 NOTE — PATIENT INSTRUCTIONS
Patient Education       Proper skin care from [facility]:    -Eliminate harsh soaps as they strip the natural oils from the skin, often resulting in dry itchy skin ( i.e. Dial, Zest, Frisian Spring)  -Use mild soaps such as Cetaphil or Dove Sensitive Skin in the shower. You do not need to use soap on arms, legs, and trunk every time you shower unless visibly soiled.   -Avoid hot or cold showers.  -After showering, lightly dry off and apply moisturizing within 2-3 minutes. This will help trap moisture in the skin.   -Aggressive use of a moisturizer at least 1-2 times a day to the entire body (including -Vanicream, Cetaphil, Aquaphor or Cerave) and moisturize hands after every washing.  -We recommend using moisturizers that come in a tub that needs to be scooped out, not a pump. This has more of an oil base. It will hold moisture in your skin much better than a water base moisturizer. The above recommended are non-pore clogging.      Wear a sunscreen with at least SPF 30 on your face, ears, neck and V of the chest daily. Wear sunscreen on other areas of the body if those areas are exposed to the sun throughout the day. Sunscreens can contain physical and/or chemical blockers. Physical blockers are less likely to clog pores, these include zinc oxide and titanium dioxide. Reapply every two hour and after swimming.     Sunscreen examples: https://www.ewg.org/sunscreen/    UV radiation  UVA radiation remains constant throughout the day and throughout the year. It is a longer wavelength than UVB and therefore penetrates deeper into the skin leading to immediate and delayed tanning, photoaging, and skin cancer. 70-80% of UVA and UVB radiation occurs between the hours of 10am-2pm.  UVB radiation  UVB radiation causes the most harmful effects and is more significant during the summer months. However, snow and ice can reflect UVB radiation leading to skin damage during the winter months as well. UVB radiation is  responsible for tanning, burning, inflammation, delayed erythema (pinkness), pigmentation (brown spots), and skin cancer.     I recommend self monthly full body exams and yearly full body exams with a dermatology provider. If you develop a new or changing lesion please follow up for examination. Most skin cancers are pink and scaly or pink and pearly. However, we do see blue/brown/black skin cancers.  Consider the ABCDEs of melanoma when giving yourself your monthly full body exam ( don't forget the groin, buttocks, feet, toes, etc). A-asymmetry, B-borders, C-color, D-diameter, E-elevation or evolving. If you see any of these changes please follow up in clinic. If you cannot see your back I recommend purchasing a hand held mirror to use with a larger wall mirror.       Checking for Skin Cancer  You can find cancer early by checking your skin each month. There are 3 kinds of skin cancer. They are melanoma, basal cell carcinoma, and squamous cell carcinoma. Doing monthly skin checks is the best way to find new marks or skin changes. Follow the instructions below for checking your skin.   The ABCDEs of checking moles for melanoma   Check your moles or growths for signs of melanoma using ABCDE:   Asymmetry: the sides of the mole or growth don t match  Border: the edges are ragged, notched, or blurred  Color: the color within the mole or growth varies  Diameter: the mole or growth is larger than 6 mm (size of a pencil eraser)  Evolving: the size, shape, or color of the mole or growth is changing (evolving is not shown in the images below)    Checking for other types of skin cancer  Basal cell carcinoma or squamous cell carcinoma have symptoms such as:     A spot or mole that looks different from all other marks on your skin  Changes in how an area feels, such as itching, tenderness, or pain  Changes in the skin's surface, such as oozing, bleeding, or scaliness  A sore that does not heal  New swelling or redness beyond  the border of a mole    Who s at risk?  Anyone can get skin cancer. But you are at greater risk if you have:   Fair skin, light-colored hair, or light-colored eyes  Many moles or abnormal moles on your skin  A history of sunburns from sunlight or tanning beds  A family history of skin cancer  A history of exposure to radiation or chemicals  A weakened immune system  If you have had skin cancer in the past, you are at risk for recurring skin cancer.   How to check your skin  Do your monthly skin checkups in front of a full-length mirror. Check all parts of your body, including your:   Head (ears, face, neck, and scalp)  Torso (front, back, and sides)  Arms (tops, undersides, upper, and lower armpits)  Hands (palms, backs, and fingers, including under the nails)  Buttocks and genitals  Legs (front, back, and sides)  Feet (tops, soles, toes, including under the nails, and between toes)  If you have a lot of moles, take digital photos of them each month. Make sure to take photos both up close and from a distance. These can help you see if any moles change over time.   Most skin changes are not cancer. But if you see any changes in your skin, call your doctor right away. Only he or she can diagnose a problem. If you have skin cancer, seeing your doctor can be the first step toward getting the treatment that could save your life.   [employer] last reviewed this educational content on 4/1/2019 2000-2020 [employer]. [address] [place] [address], [place] [address]. All rights reserved. This information is not intended as a substitute for professional medical care. Always follow your healthcare professional's instructions.       When should I call my doctor?  If you are worsening or not improving, please, contact us or seek urgent care as noted below.     Who should I call with questions (adults)?  [facility] (adult and pediatric): [phone]  [facility]  [place] (adult): [phone]  [facility] ([place], [place], [place] and [place]) [phone]  For urgent needs outside of business hours call the [facility] at [phone] and ask for the dermatology resident on call to be paged  If this is a medical emergency and you are unable to reach an ER, Call 911      If you need a prescription refill, please contact your pharmacy. Refills are approved or denied by our Physicians during normal business hours, Monday through Fridays  Per office policy, refills will not be granted if you have not been seen within the past year (or sooner depending on your child's condition)

## 2024-04-04 NOTE — LETTER
4/4/2024         RE: Richard Kitchen  [address] [place] [address]        Dear Colleague,    Thank you for referring your patient, Richard Kitchen, to the [facility]. Please see a copy of my visit note below.    [facility] Dermatology Note  Encounter Date: Apr 4, 2024  Office Visit     Reviewed patients past medical history and pertinent chart review prior to patients visit today.     Dermatology Problem List:  # NMSC  - BCC, L jawline, s/p Mohs and linear repair 9/18/23  - Several Mohs in history, skin graft on R side of nose.     ____________________________________________    Assessment & Plan:     #Actinic keratosis x1  - We discussed the precancerous nature of the skin lesions.  I recommended liquid nitrogen cryotherapy and the patient was agreeable.      Cryotherapy procedure note, location(s): right dorsal hand x1 After verbal consent and discussion of risks and benefits including, but not limited to, dyspigmentation/scar, blister, and pain, the lesion(s) was(were) treated with 1-2 mm freeze border for 1-2 cycles with liquid nitrogen. Post cryotherapy instructions were provided.    All risks, benefits and alternatives were discussed with patient.  Patient is in agreement and understands the assessment and plan.  All questions were answered.  Suly Cevallos PA-C  [facility]at[facility]  _______________________________________    CC: Derm Problem (Right hand - Vaughn is very confused about his appt date and time and also says his Right hand looks better within the last couple days and its probably a waste of time to have anyone look at it today. )    HPI:  Mr. Richard Kitchen is a(n) 74 year old male who presents today as a return patient for a lesion on the right hand. The lesion has been present for months and is asymptomatic. His daughter in law noticed it and recommended he be seen. Patient is otherwise feeling well, without additional skin  concerns.      Physical Exam:  SKIN: Focused examination of bilateral dorsal hands was performed.  - Pink macule with gritty scale involving the right dorsal hand x1, consistent with actinic keratosis.     - No other lesions of concern on areas examined.     Medications:  Current Outpatient Medications   Medication Sig Dispense Refill     atorvastatin (LIPITOR) 40 MG tablet TAKE 1 TABLET BY MOUTH EVERY DAY 90 tablet 0     Bacillus Coagulans-Inulin (PROBIOTIC) 1-250 BILLION-MG CAPS Take 1 each by mouth daily 90 capsule 1     ACE/ARB/ARNI NOT PRESCRIBED (INTENTIONAL) Please choose reason not prescribed from choices below. (Patient not taking: Reported on 4/4/2024)       No current facility-administered medications for this visit.      Past Medical History:   Patient Active Problem List   Diagnosis     Pure hypercholesterolemia     Sensorineural hearing loss (SNHL) of both ears     Malignant melanoma (H)     Bradycardia     Diminished hearing, bilateral     Renal insufficiency syndrome     Syncope     Elevated prostate specific antigen (PSA)     Stage 3a chronic kidney disease (H)     Need for hepatitis C screening test     Grief reaction with prolonged bereavement     Memory difficulties     History of nonmelanoma skin cancer     Seborrheic keratoses     Cherry angioma     Lentigines     Multiple benign nevi     AK (actinic keratosis)     Past Medical History:   Diagnosis Date     Hernia, abdominal      Malignant melanoma (H)      Malignant melanoma (H) 03/15/2022     Mumps      Skin cancer        CC Referred Self, MD  No address on file on close of this encounter.       Again, thank you for allowing me to participate in the care of your patient.        Sincerely,        Suly Cevallos PA-C

## 2024-04-04 NOTE — PROGRESS NOTES
[facility] Dermatology Note  Encounter Date: Apr 4, 2024  Office Visit     Reviewed patients past medical history and pertinent chart review prior to patients visit today.     Dermatology Problem List:  # NMSC  - BCC, L jawline, s/p Mohs and linear repair 9/18/23  - Several Mohs in history, skin graft on R side of nose.     ____________________________________________    Assessment & Plan:     #Actinic keratosis x1  - We discussed the precancerous nature of the skin lesions.  I recommended liquid nitrogen cryotherapy and the patient was agreeable.      Cryotherapy procedure note, location(s): right dorsal hand x1 After verbal consent and discussion of risks and benefits including, but not limited to, dyspigmentation/scar, blister, and pain, the lesion(s) was(were) treated with 1-2 mm freeze border for 1-2 cycles with liquid nitrogen. Post cryotherapy instructions were provided.    All risks, benefits and alternatives were discussed with patient.  Patient is in agreement and understands the assessment and plan.  All questions were answered.  Suly Cevallos PA-C  [facility]at[facility]  _______________________________________    CC: Derm Problem (Right hand - Vaughn is very confused about his appt date and time and also says his Right hand looks better within the last couple days and its probably a waste of time to have anyone look at it today. )    HPI:  Mr. Richard Kitchen is a(n) 74 year old male who presents today as a return patient for a lesion on the right hand. The lesion has been present for months and is asymptomatic. His daughter in law noticed it and recommended he be seen. Patient is otherwise feeling well, without additional skin concerns.      Physical Exam:  SKIN: Focused examination of bilateral dorsal hands was performed.  - Pink macule with gritty scale involving the right dorsal hand x1, consistent with actinic keratosis.     - No other lesions of concern on areas examined.      Medications:  Current Outpatient Medications   Medication Sig Dispense Refill    atorvastatin (LIPITOR) 40 MG tablet TAKE 1 TABLET BY MOUTH EVERY DAY 90 tablet 0    Bacillus Coagulans-Inulin (PROBIOTIC) 1-250 BILLION-MG CAPS Take 1 each by mouth daily 90 capsule 1    ACE/ARB/ARNI NOT PRESCRIBED (INTENTIONAL) Please choose reason not prescribed from choices below. (Patient not taking: Reported on 4/4/2024)       No current facility-administered medications for this visit.      Past Medical History:   Patient Active Problem List   Diagnosis    Pure hypercholesterolemia    Sensorineural hearing loss (SNHL) of both ears    Malignant melanoma (H)    Bradycardia    Diminished hearing, bilateral    Renal insufficiency syndrome    Syncope    Elevated prostate specific antigen (PSA)    Stage 3a chronic kidney disease (H)    Need for hepatitis C screening test    Grief reaction with prolonged bereavement    Memory difficulties    History of nonmelanoma skin cancer    Seborrheic keratoses    Cherry angioma    Lentigines    Multiple benign nevi    AK (actinic keratosis)     Past Medical History:   Diagnosis Date    Hernia, abdominal     Malignant melanoma (H)     Malignant melanoma (H) 03/15/2022    Mumps     Skin cancer        CC Referred Self, MD  No address on file on close of this encounter.

## 2024-05-07 ENCOUNTER — OFFICE VISIT (OUTPATIENT)
Dept: FAMILY MEDICINE | Facility: CLINIC | Age: 74
End: 2024-05-07
Payer: MEDICARE

## 2024-05-07 ENCOUNTER — TELEPHONE (OUTPATIENT)
Dept: FAMILY MEDICINE | Facility: CLINIC | Age: 74
End: 2024-05-07

## 2024-05-07 VITALS
WEIGHT: 199 LBS | TEMPERATURE: 97.8 F | RESPIRATION RATE: 13 BRPM | SYSTOLIC BLOOD PRESSURE: 128 MMHG | HEART RATE: 97 BPM | DIASTOLIC BLOOD PRESSURE: 82 MMHG | OXYGEN SATURATION: 97 % | BODY MASS INDEX: 29.47 KG/M2 | HEIGHT: 69 IN

## 2024-05-07 DIAGNOSIS — Z12.5 ENCOUNTER FOR PROSTATE CANCER SCREENING: ICD-10-CM

## 2024-05-07 DIAGNOSIS — N18.31 STAGE 3A CHRONIC KIDNEY DISEASE (H): ICD-10-CM

## 2024-05-07 DIAGNOSIS — R41.3 MEMORY DIFFICULTIES: ICD-10-CM

## 2024-05-07 DIAGNOSIS — E78.5 DYSLIPIDEMIA: ICD-10-CM

## 2024-05-07 DIAGNOSIS — D69.6 THROMBOCYTOPENIA (H): ICD-10-CM

## 2024-05-07 DIAGNOSIS — C43.9 MALIGNANT MELANOMA, UNSPECIFIED SITE (H): ICD-10-CM

## 2024-05-07 DIAGNOSIS — Z00.00 ENCOUNTER FOR MEDICARE ANNUAL WELLNESS EXAM: Primary | ICD-10-CM

## 2024-05-07 DIAGNOSIS — R73.01 IFG (IMPAIRED FASTING GLUCOSE): ICD-10-CM

## 2024-05-07 LAB
ALBUMIN SERPL BCG-MCNC: 4.5 G/DL (ref 3.5–5.2)
ALP SERPL-CCNC: 51 U/L (ref 40–150)
ALT SERPL W P-5'-P-CCNC: 19 U/L (ref 0–70)
ANION GAP SERPL CALCULATED.3IONS-SCNC: 7 MMOL/L (ref 7–15)
AST SERPL W P-5'-P-CCNC: 24 U/L (ref 0–45)
BILIRUB SERPL-MCNC: 0.7 MG/DL
BUN SERPL-MCNC: 13.8 MG/DL (ref 8–23)
CALCIUM SERPL-MCNC: 9.7 MG/DL (ref 8.8–10.2)
CHLORIDE SERPL-SCNC: 105 MMOL/L (ref 98–107)
CHOLEST SERPL-MCNC: 139 MG/DL
CREAT SERPL-MCNC: 1.12 MG/DL (ref 0.67–1.17)
CREAT UR-MCNC: 232 MG/DL
DEPRECATED HCO3 PLAS-SCNC: 28 MMOL/L (ref 22–29)
EGFRCR SERPLBLD CKD-EPI 2021: 68 ML/MIN/1.73M2
ERYTHROCYTE [DISTWIDTH] IN BLOOD BY AUTOMATED COUNT: 13.4 % (ref 10–15)
FASTING STATUS PATIENT QL REPORTED: YES
GLUCOSE SERPL-MCNC: 108 MG/DL (ref 70–99)
HCT VFR BLD AUTO: 42.1 % (ref 40–53)
HDLC SERPL-MCNC: 51 MG/DL
HGB BLD-MCNC: 14.1 G/DL (ref 13.3–17.7)
LDLC SERPL CALC-MCNC: 68 MG/DL
MCH RBC QN AUTO: 30.5 PG (ref 26.5–33)
MCHC RBC AUTO-ENTMCNC: 33.5 G/DL (ref 31.5–36.5)
MCV RBC AUTO: 91 FL (ref 78–100)
MICROALBUMIN UR-MCNC: <12 MG/L
MICROALBUMIN/CREAT UR: NORMAL MG/G{CREAT}
NONHDLC SERPL-MCNC: 88 MG/DL
PLATELET # BLD AUTO: 166 10E3/UL (ref 150–450)
POTASSIUM SERPL-SCNC: 4.1 MMOL/L (ref 3.4–5.3)
PROT SERPL-MCNC: 7.3 G/DL (ref 6.4–8.3)
PSA SERPL DL<=0.01 NG/ML-MCNC: 4.07 NG/ML (ref 0–6.5)
RBC # BLD AUTO: 4.62 10E6/UL (ref 4.4–5.9)
SODIUM SERPL-SCNC: 140 MMOL/L (ref 135–145)
TRIGL SERPL-MCNC: 101 MG/DL
TSH SERPL DL<=0.005 MIU/L-ACNC: 1.59 UIU/ML (ref 0.3–4.2)
VIT B12 SERPL-MCNC: 545 PG/ML (ref 232–1245)
WBC # BLD AUTO: 4.7 10E3/UL (ref 4–11)

## 2024-05-07 PROCEDURE — 84443 ASSAY THYROID STIM HORMONE: CPT | Performed by: INTERNAL MEDICINE

## 2024-05-07 PROCEDURE — G0439 PPPS, SUBSEQ VISIT: HCPCS | Performed by: INTERNAL MEDICINE

## 2024-05-07 PROCEDURE — 36415 COLL VENOUS BLD VENIPUNCTURE: CPT | Performed by: INTERNAL MEDICINE

## 2024-05-07 PROCEDURE — 82043 UR ALBUMIN QUANTITATIVE: CPT | Performed by: INTERNAL MEDICINE

## 2024-05-07 PROCEDURE — 80053 COMPREHEN METABOLIC PANEL: CPT | Performed by: INTERNAL MEDICINE

## 2024-05-07 PROCEDURE — G0103 PSA SCREENING: HCPCS | Performed by: INTERNAL MEDICINE

## 2024-05-07 PROCEDURE — 99214 OFFICE O/P EST MOD 30 MIN: CPT | Mod: 25 | Performed by: INTERNAL MEDICINE

## 2024-05-07 PROCEDURE — 91320 SARSCV2 VAC 30MCG TRS-SUC IM: CPT | Performed by: INTERNAL MEDICINE

## 2024-05-07 PROCEDURE — 83036 HEMOGLOBIN GLYCOSYLATED A1C: CPT | Performed by: INTERNAL MEDICINE

## 2024-05-07 PROCEDURE — 82607 VITAMIN B-12: CPT | Performed by: INTERNAL MEDICINE

## 2024-05-07 PROCEDURE — 85027 COMPLETE CBC AUTOMATED: CPT | Performed by: INTERNAL MEDICINE

## 2024-05-07 PROCEDURE — 90480 ADMN SARSCOV2 VAC 1/ONLY CMP: CPT | Performed by: INTERNAL MEDICINE

## 2024-05-07 PROCEDURE — 82570 ASSAY OF URINE CREATININE: CPT | Performed by: INTERNAL MEDICINE

## 2024-05-07 PROCEDURE — 80061 LIPID PANEL: CPT | Performed by: INTERNAL MEDICINE

## 2024-05-07 RX ORDER — ATORVASTATIN CALCIUM 40 MG/1
40 TABLET, FILM COATED ORAL DAILY
Qty: 90 TABLET | Refills: 0 | Status: ON HOLD | OUTPATIENT
Start: 2024-05-07 | End: 2024-08-27

## 2024-05-07 SDOH — HEALTH STABILITY: PHYSICAL HEALTH: ON AVERAGE, HOW MANY DAYS PER WEEK DO YOU ENGAGE IN MODERATE TO STRENUOUS EXERCISE (LIKE A BRISK WALK)?: 7 DAYS

## 2024-05-07 SDOH — HEALTH STABILITY: PHYSICAL HEALTH: ON AVERAGE, HOW MANY MINUTES DO YOU ENGAGE IN EXERCISE AT THIS LEVEL?: 60 MIN

## 2024-05-07 ASSESSMENT — SOCIAL DETERMINANTS OF HEALTH (SDOH): HOW OFTEN DO YOU GET TOGETHER WITH FRIENDS OR RELATIVES?: MORE THAN THREE TIMES A WEEK

## 2024-05-07 ASSESSMENT — PAIN SCALES - GENERAL: PAINLEVEL: NO PAIN (0)

## 2024-05-07 NOTE — PROGRESS NOTES
Preventive Care Visit  [facility] VIPIN Acosta MD, Internal Medicine  May 7, 2024          Assessment and Plan  1. Encounter for Medicare annual wellness exam    Last seen patient in April 2023 for annual wellness visit, he is here for the same.  Questionable memory issues with all the workup done at that time including neurology referral.      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     Following dermatology for surveillance on Malignant melanoma.     Does have dyslipidemia on Lipitor 40 mg daily.      Last lab work done in March 2024 by neurology on normal B1 and B6 levels as well, all other lab work in April 2023 showing normal CMP, borderline dyslipidemia, elevated PSA in the past, thrombocytopenia which is chronic and remaining at baseline 130s.      - REVIEW OF HEALTH MAINTENANCE PROTOCOL ORDERS  - COVID-19 12+ (2023-24) (PFIZER)  - Lipid panel reflex to direct LDL Non-fasting; Future  - Albumin Random Urine Quantitative with Creat Ratio; Future  - atorvastatin (LIPITOR) 40 MG tablet; Take 1 tablet (40 mg) by mouth daily  Dispense: 90 tablet; Refill: 0  - Comprehensive metabolic panel (BMP + Alb, Alk Phos, ALT, AST, Total. Bili, TP); Future  - Vitamin B12; Future  - TSH with free T4 reflex; Future  - CBC with platelets; Future  - PSA, screen; Future    2. Stage 3a chronic kidney disease (H)  - Albumin Random Urine Quantitative with Creat Ratio; Future  - Comprehensive metabolic panel (BMP + Alb, Alk Phos, ALT, AST, Total. Bili, TP); Future    3. Malignant melanoma, unspecified site (H)  - CBC with platelets; Future    4. Thrombocytopenia (H24)  - CBC with platelets; Future    5. Dyslipidemia  Chronic , well controlled.  Continue current Lipitor  - Lipid panel reflex to direct LDL Non-fasting; Future  - atorvastatin (LIPITOR) 40 MG tablet; Take 1 tablet (40 mg) by mouth daily  Dispense: 90 tablet; Refill: 0  - Comprehensive metabolic panel (BMP + Alb, Alk Phos, ALT, AST, Total. Bili, TP); Future    6. Memory difficulties  Ongoing problem, uncontrolled. Minicog test negative for dementia today. Reviewed recent neurology note on March 30, 2024 showing positive for mild cognitive impairment as per the specialist with MRI also done, recommended neuropsychological testing appointment which patient was unable to keep it up.   - Emphasized to keep up the appointment with Neuropsychology which pt had multiple questions. Answered all of them, Please see AVS below for further details.   - Vitamin B12; Future  - TSH with free T4 reflex; Future    7. Encounter for prostate cancer screening  - PSA, screen; Future    8. IFG (impaired fasting glucose)  - Hemoglobin A1c; Future        Please note that this note consists of symbols derived from keyboarding, dictation and/or voice recognition software. As a result, there may be errors in the script that have gone undetected. Please consider this when interpreting information found in this chart.    Patient Instructions   As discussed , please do fasting labs placed.     Will consider refills of Atorvastatin for your cholesterol for the year.     Please take RSV vaccine at your pharmacy for better coverage     Please keep the Neuropsychology referral given as below -         Preventive Care Advice   This is general advice we often give to help people stay healthy. Your care team may have specific advice just for you. Please talk to your care team about your own preventive care needs.  Lifestyle  Exercise at least 150 minutes each week (30 minutes a day, 5 days a week).  Do muscle strengthening activities 2 days a week. These help control your weight and prevent disease.  No smoking.  Wear sunscreen to  "prevent skin cancer.  Have your home tested for radon every 2 to 5 years. Radon is a colorless, odorless gas that can harm your lungs. To learn more, go to www.health.[facility].[place].us and search for \"Radon in Homes.\"  Keep guns unloaded and locked up in a safe place like a safe or gun vault, or, use a gun lock and hide the keys. Always lock away bullets separately. To learn more, visit [facility].[place].gov and search for \"safe gun storage.\"  Nutrition  Eat 5 or more servings of fruits and vegetables each day.  Try wheat bread, brown rice and whole grain pasta (instead of white bread, rice, and pasta).  Get enough calcium and vitamin D. Check the label on foods and aim for 100% of the RDA (recommended daily allowance).  Regular exams  Have a dental exam and cleaning every 6 months.  See your health care team every year to talk about:  Any changes in your health.  Any medicines your care team has prescribed.  Preventive care, family planning, and ways to prevent chronic diseases.  Shots (vaccines)   HPV shots (up to age 26), if you've never had them before.  Hepatitis B shots (up to age 59), if you've never had them before.  COVID-19 shot: Get this shot when it's due.  Flu shot: Get a flu shot every year.  Tetanus shot: Get a tetanus shot every 10 years.  Pneumococcal, hepatitis A, and RSV shots: Ask your care team if you need these based on your risk.  Shingles shot (for age 50 and up).  General health tests  Diabetes screening:  Starting at age 35, Get screened for diabetes at least every 3 years.  If you are younger than age 35, ask your care team if you should be screened for diabetes.  Cholesterol test: At age 39, start having a cholesterol test every 5 years, or more often if advised.  Bone density scan (DEXA): At age 50, ask your care team if you should have this scan for osteoporosis (brittle bones).  Hepatitis C: Get tested at least once in your life.  Abdominal aortic aneurysm screening: Talk to your doctor about having " this screening if you:  Have ever smoked; and  Are biologically male; and  Are between the ages of 65 and 75.  STIs (sexually transmitted infections)  Before age 24: Ask your care team if you should be screened for STIs.  After age 24: Get screened for STIs if you're at risk. You are at risk for STIs (including HIV) if:  You are sexually active with more than one person.  You don't use condoms every time.  You or a partner was diagnosed with a sexually transmitted infection.  If you are at risk for HIV, ask about PrEP medicine to prevent HIV.  Get tested for HIV at least once in your life, whether you are at risk for HIV or not.  Cancer screening tests  Cervical cancer screening: If you have a cervix, begin getting regular cervical cancer screening tests at age 21. Most people who have regular screenings with normal results can stop after age 65. Talk about this with your provider.  Breast cancer scan (mammogram): If you've ever had breasts, begin having regular mammograms starting at age 40. This is a scan to check for breast cancer.  Colon cancer screening: It is important to start screening for colon cancer at age 45.  Have a colonoscopy test every 10 years (or more often if you're at risk) Or, ask your provider about stool tests like a FIT test every year or Cologuard test every 3 years.  To learn more about your testing options, visit: [email or web address]/369850.pdf.  For help making a decision, visit: [email or web address].  Prostate cancer screening test: If you have a prostate and are age 55 to 69, ask your provider if you would benefit from a yearly prostate cancer screening test.  Lung cancer screening: If you are a current or former smoker age 50 to 80, ask your care team if ongoing lung cancer screenings are right for you.  For informational purposes only. Not to replace the advice of your health care provider. Copyright   2023 [employer] [facility]. All rights reserved. Clinically reviewed by the [facility]  [place] [facility]. [employer] 775514 - REV 04/24.    Hearing Loss: Care Instructions  Overview     Hearing loss is a sudden or slow decrease in how well you hear. It can range from slight to profound. Permanent hearing loss can occur with aging. It also can happen when you are exposed long-term to loud noise. Examples include listening to loud music, riding motorcycles, or being around other loud machines.  Hearing loss can affect your work and home life. It can make you feel lonely or depressed. You may feel that you have lost your independence. But hearing aids and other devices can help you hear better and feel connected to others.  Follow-up care is a key part of your treatment and safety. Be sure to make and go to all appointments, and call your doctor if you are having problems. It's also a good idea to know your test results and keep a list of the medicines you take.  How can you care for yourself at home?  Avoid loud noises whenever possible. This helps keep your hearing from getting worse.  Always wear hearing protection around loud noises.  Wear a hearing aid as directed.  A professional can help you pick a hearing aid that will work best for you.  You can also get hearing aids over the counter for mild to moderate hearing loss.  Have hearing tests as your doctor suggests. They can show whether your hearing has changed. Your hearing aid may need to be adjusted.  Use other devices as needed. These may include:  Telephone amplifiers and hearing aids that can connect to a television, stereo, radio, or microphone.  Devices that use lights or vibrations. These alert you to the doorbell, a ringing telephone, or a baby monitor.  Television closed-captioning. This shows the words at the bottom of the screen. Most new TVs can do this.  TTY (text telephone). This lets you type messages back and forth on the telephone instead of talking or listening. These devices are also called TDD. When messages are  "typed on the keyboard, they are sent over the phone line to a receiving TTY. The message is shown on a monitor.  Use text messaging, social media, and email if it is hard for you to communicate by telephone.  Try to learn a listening technique called speechreading. It is not lipreading. You pay attention to people's gestures, expressions, posture, and tone of voice. These clues can help you understand what a person is saying. Face the person you are talking to, and have them face you. Make sure the lighting is good. You need to see the other person's face clearly.  Think about counseling if you need help to adjust to your hearing loss.  When should you call for help?  Watch closely for changes in your health, and be sure to contact your doctor if:    You think your hearing is getting worse.     You have new symptoms, such as dizziness or nausea.   Where can you learn more?  Go to https://www.[employer]ious Business/patiented  Enter R798 in the search box to learn more about \"Hearing Loss: Care Instructions.\"  Current as of: September 27, 2023               Content Version: 14.0    5948-6291 [employer].   Care instructions adapted under license by your healthcare professional. If you have questions about a medical condition or this instruction, always ask your healthcare professional. [employer] disclaims any warranty or liability for your use of this information.      Bladder Training: Care Instructions  Your Care Instructions     Bladder training is used to treat urge incontinence and stress incontinence. Urge incontinence means that the need to urinate comes on so fast that you can't get to a toilet in time. Stress incontinence means that you leak urine because of pressure on your bladder. For example, it may happen when you laugh, cough, or lift something heavy.  Bladder training can increase how long you can wait before you have to urinate. It can also help your bladder hold more urine. And it " can give you better control over the urge to urinate.  It is important to remember that bladder training takes a few weeks to a few months to make a difference. You may not see results right away, but don't give up.  Follow-up care is a key part of your treatment and safety. Be sure to make and go to all appointments, and call your doctor if you are having problems. It's also a good idea to know your test results and keep a list of the medicines you take.  How can you care for yourself at home?  Work with your doctor to come up with a bladder training program that is right for you. You may use one or more of the following methods.  Delayed urination  In the beginning, try to keep from urinating for 5 minutes after you first feel the need to go.  While you wait, take deep, slow breaths to relax. Kegel exercises can also help you delay the need to go to the bathroom.  After some practice, when you can easily wait 5 minutes to urinate, try to wait 10 minutes before you urinate.  Slowly increase the waiting period until you are able to control when you have to urinate.  Scheduled urination  Empty your bladder when you first wake up in the morning.  Schedule times throughout the day when you will urinate.  Start by going to the bathroom every hour, even if you don't need to go.  Slowly increase the time between trips to the bathroom.  When you have found a schedule that works well for you, keep doing it.  If you wake up during the night and have to urinate, do it. Apply your schedule to waking hours only.  Kegel exercises  These tighten and strengthen pelvic muscles, which can help you control the flow of urine. (If doing these exercises causes pain, stop doing them and talk with your doctor.) To do Kegel exercises:  Squeeze your muscles as if you were trying not to pass gas. Or squeeze your muscles as if you were stopping the flow of urine. Your belly, legs, and buttocks shouldn't move.  Hold the squeeze for 3 seconds,  "then relax for 5 to 10 seconds.  Start with 3 seconds, then add 1 second each week until you are able to squeeze for 10 seconds.  Repeat the exercise 10 times a session. Do 3 to 8 sessions a day.  When should you call for help?  Watch closely for changes in your health, and be sure to contact your doctor if:    Your incontinence is getting worse.     You do not get better as expected.   Where can you learn more?  Go to https://www.Zootcard.net/patiented  Enter V684 in the search box to learn more about \"Bladder Training: Care Instructions.\"  Current as of: November 15, 2023               Content Version: 14.0    3973-9031 [employer].   Care instructions adapted under license by your healthcare professional. If you have questions about a medical condition or this instruction, always ask your healthcare professional. [employer] disclaims any warranty or liability for your use of this information.      Substance Use Disorder: Care Instructions  Overview     You can improve your life and health by stopping your use of alcohol or drugs. When you don't drink or use drugs, you may feel and sleep better. You may get along better with your family, friends, and coworkers. There are medicines and programs that can help with substance use disorder.  How can you care for yourself at home?  Here are some ways to help you stay sober and prevent relapse.  If you have been given medicine to help keep you sober or reduce your cravings, be sure to take it exactly as prescribed.  Talk to your doctor about programs that can help you stop using drugs or drinking alcohol.  Do not keep alcohol or drugs in your home.  Plan ahead. Think about what you'll say if other people ask you to drink or use drugs. Try not to spend time with people who drink or use drugs.  Use the time and money spent on drinking or drugs to do something that's important to you.  Preventing a relapse  Have a plan to deal with relapse. " Learn to recognize changes in your thinking that lead you to drink or use drugs. Get help before you start to drink or use drugs again.  Try to stay away from situations, friends, or places that may lead you to drink or use drugs.  If you feel the need to drink alcohol or use drugs again, seek help right away. Call a trusted friend or family member. Some people get support from organizations such as Narcotics Anonymous or [employer] or from treatment facilities.  If you relapse, get help as soon as you can. Some people make a plan with another person that outlines what they want that person to do for them if they relapse. The plan usually includes how to handle the relapse and who to notify in case of relapse.  Don't give up. Remember that a relapse doesn't mean that you have failed. Use the experience to learn the triggers that lead you to drink or use drugs. Then quit again. Recovery is a lifelong process. Many people have several relapses before they are able to quit for good.  Follow-up care is a key part of your treatment and safety. Be sure to make and go to all appointments, and call your doctor if you are having problems. It's also a good idea to know your test results and keep a list of the medicines you take.  When should you call for help?   Call 911  anytime you think you may need emergency care. For example, call if you or someone else:    Has overdosed or has withdrawal signs. Be sure to tell the emergency workers that you are or someone else is using or trying to quit using drugs. Overdose or withdrawal signs may include:  Losing consciousness.  Seizure.  Seeing or hearing things that aren't there (hallucinations).     Is thinking or talking about suicide or harming others.   Where to get help 24 hours a day, 7 days a week   If you or someone you know talks about suicide, self-harm, a mental health crisis, a substance use crisis, or any other kind of emotional distress, get help right away. You  "can:    Call the Suicide and Crisis Lifeline at 988.     Call 4-028-288-ZOZI (1-358.418.1475).     Text HOME to 532639 to access the Crisis Text Line.   Consider saving these numbers in your phone.  Go to [employer]Bionostra for more information or to chat online.  Call your doctor now or seek immediate medical care if:    You are having withdrawal symptoms. These may include nausea or vomiting, sweating, shakiness, and anxiety.   Watch closely for changes in your health, and be sure to contact your doctor if:    You have a relapse.     You need more help or support to stop.   Where can you learn more?  Go to https://www.Oculus VR.net/patiented  Enter H573 in the search box to learn more about \"Substance Use Disorder: Care Instructions.\"  Current as of: November 15, 2023               Content Version: 14.0    8349-7259 [employer].   Care instructions adapted under license by your healthcare professional. If you have questions about a medical condition or this instruction, always ask your healthcare professional. [employer] disclaims any warranty or liability for your use of this information.       Return in about 6 months (around 11/7/2024), or if symptoms worsen or fail to improve, for Follow up of last visit, If symptoms persist, video visit.    Susanna Acosta MD  [facility] PRADARRICK Mendes is a 74 year old, presenting for the following:  Wellness Visit        5/7/2024     8:18 AM   Additional Questions   Roomed by [facility]         Health Care Directive  Patient does not have a Health Care Directive or Living Will: N/A       HPI        5/7/2024   General Health   How would you rate your overall physical health? Good   Feel stress (tense, anxious, or unable to sleep) Not at all         5/7/2024   Nutrition   Diet: Regular (no restrictions)    Vegetarian/vegan         5/7/2024   Exercise   Days per week of moderate/strenous exercise 7 days   Average " minutes spent exercising at this level 60 min         5/7/2024   Social Factors   Frequency of gathering with friends or relatives More than three times a week   Worry food won't last until get money to buy more No   Food not last or not have enough money for food? No   Do you have housing?  Yes   Are you worried about losing your housing? No   Lack of transportation? No   Unable to get utilities (heat,electricity)? No         5/7/2024   Fall Risk   Fallen 2 or more times in the past year? No   Trouble with walking or balance? No          5/7/2024   Activities of Daily Living- Home Safety   Needs help with the following daily activites None of the above   Safety concerns in the home None of the above         5/7/2024   Dental   Dentist two times every year? Yes         5/7/2024   Hearing Screening   Hearing concerns? (!) IT'S HARD TO FOLLOW A CONVERSATION IN A NOISY RESTAURANT OR CROWDED ROOM.    (!) TROUBLE UNDESTANDING A SPEAKER IN A PUBLIC MEETING OR Scientologist SERVICE.    (!) TROUBLE UNDERSTANDING SOFT OR WHISPERED SPEECH.         5/7/2024   Driving Risk Screening   Patient/family members have concerns about driving No         5/7/2024   General Alertness/Fatigue Screening   Have you been more tired than usual lately? No         5/7/2024   Urinary Incontinence Screening   Bothered by leaking urine in past 6 months Yes         5/7/2024   TB Screening   Were you born outside of the [place]? No         Today's PHQ-2 Score:       5/7/2024     8:03 AM   PHQ-2 ( 1999 [employer])   Q1: Little interest or pleasure in doing things 0   Q2: Feeling down, depressed or hopeless 1   PHQ-2 Score 1   Q1: Little interest or pleasure in doing things Not at all   Q2: Feeling down, depressed or hopeless Several days   PHQ-2 Score 1           5/7/2024   Substance Use   Alcohol more than 3/day or more than 7/wk No   Do you have a current opioid prescription? No   How severe/bad is pain from 1 to 10? 0/10 (No Pain)   Do you use any other  substances recreationally? (!) ALCOHOL     Social History     Tobacco Use    Smoking status: Former     Current packs/day: 0.50     Average packs/day: 0.5 packs/day for 1 year (0.5 ttl pk-yrs)     Types: Cigarettes    Smokeless tobacco: Never    Tobacco comments:     1 cigareete a year with freinds but never since age of 18 yrs when he smoked for 1 year.   Vaping Use    Vaping status: Never Used   Substance Use Topics    Alcohol use: Yes     Comment: 6 glasses of wine per week    Drug use: Never       ASCVD Risk   The 10-year ASCVD risk score (Sangita CASTILLO, et al., 2019) is: 24.8%    Values used to calculate the score:      Age: 74 years      Sex: Male      Is Non- : No      Diabetic: No      Tobacco smoker: No      Systolic Blood Pressure: 128 mmHg      Is BP treated: No      HDL Cholesterol: 65 mg/dL      Total Cholesterol: 214 mg/dL      Reviewed and updated as needed this visit by Provider   Tobacco  Allergies  Meds  Problems  Med Hx  Surg Hx  Fam Hx            Past Medical History:   Diagnosis Date    Hernia, abdominal     Malignant melanoma (H)     Malignant melanoma (H) 03/15/2022    Mumps     Skin cancer      Past Surgical History:   Procedure Laterality Date    BIOPSY  2012, 2015    Skin cancer    HERNIA REPAIR  1984    VASECTOMY       Lab work is in process  Labs reviewed in EPIC  BP Readings from Last 3 Encounters:   05/07/24 128/82   03/13/24 123/85   09/18/23 (!) 153/81    Wt Readings from Last 3 Encounters:   05/07/24 90.3 kg (199 lb)   08/16/23 90.9 kg (200 lb 4.8 oz)   04/05/23 93.1 kg (205 lb 3.2 oz)                  Patient Active Problem List   Diagnosis    Pure hypercholesterolemia    Sensorineural hearing loss (SNHL) of both ears    Malignant melanoma (H)    Bradycardia    Diminished hearing, bilateral    Renal insufficiency syndrome    Syncope    Elevated prostate specific antigen (PSA)    Stage 3a chronic kidney disease (H)    Need for hepatitis C screening  test    Grief reaction with prolonged bereavement    Memory difficulties    History of nonmelanoma skin cancer    Seborrheic keratoses    Cherry angioma    Lentigines    Multiple benign nevi    AK (actinic keratosis)    Thrombocytopenia (H24)     Past Surgical History:   Procedure Laterality Date    BIOPSY  2012, 2015    Skin cancer    HERNIA REPAIR  1984    VASECTOMY         Social History     Tobacco Use    Smoking status: Former     Current packs/day: 0.50     Average packs/day: 0.5 packs/day for 1 year (0.5 ttl pk-yrs)     Types: Cigarettes    Smokeless tobacco: Never    Tobacco comments:     1 cigareete a year with frebeau but never since age of 18 yrs when he smoked for 1 year.   Substance Use Topics    Alcohol use: Yes     Comment: 6 glasses of wine per week     Family History   Problem Relation Age of Onset    Prostate Cancer Father     Colon Cancer Paternal Grandmother     Colon Cancer Other          Current Outpatient Medications   Medication Sig Dispense Refill    atorvastatin (LIPITOR) 40 MG tablet Take 1 tablet (40 mg) by mouth daily 90 tablet 0    ACE/ARB/ARNI NOT PRESCRIBED (INTENTIONAL) Please choose reason not prescribed from choices below. (Patient not taking: Reported on 5/7/2024)       Allergies   Allergen Reactions    Similasan Hay Fever Relief [Cold-Eeze]     Seasonal Allergies Other (See Comments)     Rhinitis      Recent Labs   Lab Test 04/05/23  1007 03/24/22  0936 02/02/21  1219 08/29/18  0000   * 106* 122* 73   HDL 65 74 61 60   TRIG 182* 73 88 91   ALT 20 28 34 28   CR 1.08 1.16 1.03 1.35   GFRESTIMATED 72 67 72 52*   GFRESTBLACK  --   --  83 63   POTASSIUM 4.0 4.1 4.0 3.7   TSH 2.53  --   --  2.59      Current providers sharing in care for this patient include:  Patient Care Team:  Susanna Acosta MD as PCP - General (Internal Medicine)  Susanna Acosta MD as Assigned PCP  oJhnie Gunn MD as MD (Urology)  Annalee Spence PABREE as Physician Assistant  "(Dermatology)  Flores Bullard, NP as Nurse Practitioner (ENT-Otolaryngology)  Rafael Stuart MD as MD (Neurology)  Annalee Spence PA-C as Physician Assistant (Dermatology)  Rafael Stuart MD as Assigned Neuroscience Provider  Annalee Spence PA-C as Assigned Surgical Provider  Augusto Hernandez, PhD LP as MD (Neuropsychology)    The following health maintenance items are reviewed in Epic and correct as of today:  Health Maintenance   Topic Date Due    IPV IMMUNIZATION (2 of 3 - Adult catch-up series) 03/17/2003    RSV VACCINE (Pregnancy & 60+) (1 - 1-dose 60+ series) Never done    BMP  04/05/2024    LIPID  04/05/2024    MICROALBUMIN  04/05/2024    HEMOGLOBIN  04/05/2024    INFLUENZA VACCINE (Season Ended) 09/01/2024    MEDICARE ANNUAL WELLNESS VISIT  05/07/2025    ANNUAL REVIEW OF HM ORDERS  05/07/2025    FALL RISK ASSESSMENT  05/07/2025    GLUCOSE  04/05/2026    ADVANCE CARE PLANNING  04/06/2028    DTAP/TDAP/TD IMMUNIZATION (4 - Td or Tdap) 02/02/2031    HEPATITIS C SCREENING  Completed    PHQ-2 (once per calendar year)  Completed    Pneumococcal Vaccine: 65+ Years  Completed    URINALYSIS  Completed    ZOSTER IMMUNIZATION  Completed    COVID-19 Vaccine  Completed    HPV IMMUNIZATION  Aged Out    MENINGITIS IMMUNIZATION  Aged Out    RSV MONOCLONAL ANTIBODY  Aged Out    COLORECTAL CANCER SCREENING  Discontinued    LUNG CANCER SCREENING  Discontinued         Review of Systems  Constitutional, HEENT, cardiovascular, pulmonary, GI, , musculoskeletal, neuro, skin, endocrine and psych systems are negative, except as otherwise noted.     Objective    Exam  /82   Pulse 97   Temp 97.8  F (36.6  C) (Temporal)   Resp 13   Ht 1.74 m (5' 8.5\")   Wt 90.3 kg (199 lb)   SpO2 97%   BMI 29.82 kg/m     Estimated body mass index is 29.82 kg/m  as calculated from the following:    Height as of this encounter: 1.74 m (5' 8.5\").    Weight as of this encounter: 90.3 kg (199 " lb).    Physical Exam  GENERAL: alert and no distress  EYES: Eyes grossly normal to inspection, PERRL and conjunctivae and sclerae normal  HENT: ear canals and TM's + Bilateral cerumen , nose and mouth without ulcers or lesions  NECK: no adenopathy, no asymmetry, masses, or scars  RESP: lungs clear to auscultation - no rales, rhonchi or wheezes  CV: regular rate and rhythm, normal S1 S2, no S3 or S4, no murmur, click or rub, no peripheral edema  ABDOMEN: soft, nontender, no hepatosplenomegaly, no masses and bowel sounds normal  MS: no gross musculoskeletal defects noted, no edema  SKIN: no suspicious lesions or rashes  NEURO: Normal strength and tone, mentation intact and speech normal  PSYCH: mentation appears normal, affect normal/bright        5/7/2024   Mini Cog   Clock Draw Score 2 Normal   3 Item Recall 2 objects recalled   Mini Cog Total Score 4              Signed Electronically by: Susanna Acosta MD

## 2024-05-07 NOTE — PATIENT INSTRUCTIONS
"As discussed , please do fasting labs placed.     Will consider refills of Atorvastatin for your cholesterol for the year.     Please take RSV vaccine at your pharmacy for better coverage     Please keep the Neuropsychology referral given as below -         Preventive Care Advice   This is general advice we often give to help people stay healthy. Your care team may have specific advice just for you. Please talk to your care team about your own preventive care needs.  Lifestyle  Exercise at least 150 minutes each week (30 minutes a day, 5 days a week).  Do muscle strengthening activities 2 days a week. These help control your weight and prevent disease.  No smoking.  Wear sunscreen to prevent skin cancer.  Have your home tested for radon every 2 to 5 years. Radon is a colorless, odorless gas that can harm your lungs. To learn more, go to www.health.Counts include 234 beds at the [facility].mn.us and search for \"Radon in Homes.\"  Keep guns unloaded and locked up in a safe place like a safe or gun vault, or, use a gun lock and hide the keys. Always lock away bullets separately. To learn more, visit [employer].mn.gov and search for \"safe gun storage.\"  Nutrition  Eat 5 or more servings of fruits and vegetables each day.  Try wheat bread, brown rice and whole grain pasta (instead of white bread, rice, and pasta).  Get enough calcium and vitamin D. Check the label on foods and aim for 100% of the RDA (recommended daily allowance).  Regular exams  Have a dental exam and cleaning every 6 months.  See your health care team every year to talk about:  Any changes in your health.  Any medicines your care team has prescribed.  Preventive care, family planning, and ways to prevent chronic diseases.  Shots (vaccines)   HPV shots (up to age 26), if you've never had them before.  Hepatitis B shots (up to age 59), if you've never had them before.  COVID-19 shot: Get this shot when it's due.  Flu shot: Get a flu shot every year.  Tetanus shot: Get a tetanus shot every 10 " years.  Pneumococcal, hepatitis A, and RSV shots: Ask your care team if you need these based on your risk.  Shingles shot (for age 50 and up).  General health tests  Diabetes screening:  Starting at age 35, Get screened for diabetes at least every 3 years.  If you are younger than age 35, ask your care team if you should be screened for diabetes.  Cholesterol test: At age 39, start having a cholesterol test every 5 years, or more often if advised.  Bone density scan (DEXA): At age 50, ask your care team if you should have this scan for osteoporosis (brittle bones).  Hepatitis C: Get tested at least once in your life.  Abdominal aortic aneurysm screening: Talk to your doctor about having this screening if you:  Have ever smoked; and  Are biologically male; and  Are between the ages of 65 and 75.  STIs (sexually transmitted infections)  Before age 24: Ask your care team if you should be screened for STIs.  After age 24: Get screened for STIs if you're at risk. You are at risk for STIs (including HIV) if:  You are sexually active with more than one person.  You don't use condoms every time.  You or a partner was diagnosed with a sexually transmitted infection.  If you are at risk for HIV, ask about PrEP medicine to prevent HIV.  Get tested for HIV at least once in your life, whether you are at risk for HIV or not.  Cancer screening tests  Cervical cancer screening: If you have a cervix, begin getting regular cervical cancer screening tests at age 21. Most people who have regular screenings with normal results can stop after age 65. Talk about this with your provider.  Breast cancer scan (mammogram): If you've ever had breasts, begin having regular mammograms starting at age 40. This is a scan to check for breast cancer.  Colon cancer screening: It is important to start screening for colon cancer at age 45.  Have a colonoscopy test every 10 years (or more often if you're at risk) Or, ask your provider about stool tests  like a FIT test every year or Cologuard test every 3 years.  To learn more about your testing options, visit: www.Any.DO/581202.pdf.  For help making a decision, visit: maryellen/sz53221.  Prostate cancer screening test: If you have a prostate and are age 55 to 69, ask your provider if you would benefit from a yearly prostate cancer screening test.  Lung cancer screening: If you are a current or former smoker age 50 to 80, ask your care team if ongoing lung cancer screenings are right for you.  For informational purposes only. Not to replace the advice of your health care provider. Copyright   2023 [employer]. All rights reserved. Clinically reviewed by the [employer] [facility]. [employer] 447316 - REV 04/24.    Hearing Loss: Care Instructions  Overview     Hearing loss is a sudden or slow decrease in how well you hear. It can range from slight to profound. Permanent hearing loss can occur with aging. It also can happen when you are exposed long-term to loud noise. Examples include listening to loud music, riding motorcycles, or being around other loud machines.  Hearing loss can affect your work and home life. It can make you feel lonely or depressed. You may feel that you have lost your independence. But hearing aids and other devices can help you hear better and feel connected to others.  Follow-up care is a key part of your treatment and safety. Be sure to make and go to all appointments, and call your doctor if you are having problems. It's also a good idea to know your test results and keep a list of the medicines you take.  How can you care for yourself at home?  Avoid loud noises whenever possible. This helps keep your hearing from getting worse.  Always wear hearing protection around loud noises.  Wear a hearing aid as directed.  A professional can help you pick a hearing aid that will work best for you.  You can also get hearing aids over the counter for mild to moderate  "hearing loss.  Have hearing tests as your doctor suggests. They can show whether your hearing has changed. Your hearing aid may need to be adjusted.  Use other devices as needed. These may include:  Telephone amplifiers and hearing aids that can connect to a television, stereo, radio, or microphone.  Devices that use lights or vibrations. These alert you to the doorbell, a ringing telephone, or a baby monitor.  Television closed-captioning. This shows the words at the bottom of the screen. Most new TVs can do this.  TTY (text telephone). This lets you type messages back and forth on the telephone instead of talking or listening. These devices are also called TDD. When messages are typed on the keyboard, they are sent over the phone line to a receiving TTY. The message is shown on a monitor.  Use text messaging, social media, and email if it is hard for you to communicate by telephone.  Try to learn a listening technique called speechreading. It is not lipreading. You pay attention to people's gestures, expressions, posture, and tone of voice. These clues can help you understand what a person is saying. Face the person you are talking to, and have them face you. Make sure the lighting is good. You need to see the other person's face clearly.  Think about counseling if you need help to adjust to your hearing loss.  When should you call for help?  Watch closely for changes in your health, and be sure to contact your doctor if:    You think your hearing is getting worse.     You have new symptoms, such as dizziness or nausea.   Where can you learn more?  Go to https://www.[employer].net/patiented  Enter R798 in the search box to learn more about \"Hearing Loss: Care Instructions.\"  Current as of: September 27, 2023               Content Version: 14.0    8178-0748 [employer].   Care instructions adapted under license by your healthcare professional. If you have questions about a medical condition or this " instruction, always ask your healthcare professional. [employer] disclaims any warranty or liability for your use of this information.      Bladder Training: Care Instructions  Your Care Instructions     Bladder training is used to treat urge incontinence and stress incontinence. Urge incontinence means that the need to urinate comes on so fast that you can't get to a toilet in time. Stress incontinence means that you leak urine because of pressure on your bladder. For example, it may happen when you laugh, cough, or lift something heavy.  Bladder training can increase how long you can wait before you have to urinate. It can also help your bladder hold more urine. And it can give you better control over the urge to urinate.  It is important to remember that bladder training takes a few weeks to a few months to make a difference. You may not see results right away, but don't give up.  Follow-up care is a key part of your treatment and safety. Be sure to make and go to all appointments, and call your doctor if you are having problems. It's also a good idea to know your test results and keep a list of the medicines you take.  How can you care for yourself at home?  Work with your doctor to come up with a bladder training program that is right for you. You may use one or more of the following methods.  Delayed urination  In the beginning, try to keep from urinating for 5 minutes after you first feel the need to go.  While you wait, take deep, slow breaths to relax. Kegel exercises can also help you delay the need to go to the bathroom.  After some practice, when you can easily wait 5 minutes to urinate, try to wait 10 minutes before you urinate.  Slowly increase the waiting period until you are able to control when you have to urinate.  Scheduled urination  Empty your bladder when you first wake up in the morning.  Schedule times throughout the day when you will urinate.  Start by going to the bathroom  "every hour, even if you don't need to go.  Slowly increase the time between trips to the bathroom.  When you have found a schedule that works well for you, keep doing it.  If you wake up during the night and have to urinate, do it. Apply your schedule to waking hours only.  Kegel exercises  These tighten and strengthen pelvic muscles, which can help you control the flow of urine. (If doing these exercises causes pain, stop doing them and talk with your doctor.) To do Kegel exercises:  Squeeze your muscles as if you were trying not to pass gas. Or squeeze your muscles as if you were stopping the flow of urine. Your belly, legs, and buttocks shouldn't move.  Hold the squeeze for 3 seconds, then relax for 5 to 10 seconds.  Start with 3 seconds, then add 1 second each week until you are able to squeeze for 10 seconds.  Repeat the exercise 10 times a session. Do 3 to 8 sessions a day.  When should you call for help?  Watch closely for changes in your health, and be sure to contact your doctor if:    Your incontinence is getting worse.     You do not get better as expected.   Where can you learn more?  Go to https://www.[employer].net/patiented  Enter V684 in the search box to learn more about \"Bladder Training: Care Instructions.\"  Current as of: November 15, 2023               Content Version: 14.0    1342-5698 [employer].   Care instructions adapted under license by your healthcare professional. If you have questions about a medical condition or this instruction, always ask your healthcare professional. [employer] disclaims any warranty or liability for your use of this information.      Substance Use Disorder: Care Instructions  Overview     You can improve your life and health by stopping your use of alcohol or drugs. When you don't drink or use drugs, you may feel and sleep better. You may get along better with your family, friends, and coworkers. There are medicines and programs that can " help with substance use disorder.  How can you care for yourself at home?  Here are some ways to help you stay sober and prevent relapse.  If you have been given medicine to help keep you sober or reduce your cravings, be sure to take it exactly as prescribed.  Talk to your doctor about programs that can help you stop using drugs or drinking alcohol.  Do not keep alcohol or drugs in your home.  Plan ahead. Think about what you'll say if other people ask you to drink or use drugs. Try not to spend time with people who drink or use drugs.  Use the time and money spent on drinking or drugs to do something that's important to you.  Preventing a relapse  Have a plan to deal with relapse. Learn to recognize changes in your thinking that lead you to drink or use drugs. Get help before you start to drink or use drugs again.  Try to stay away from situations, friends, or places that may lead you to drink or use drugs.  If you feel the need to drink alcohol or use drugs again, seek help right away. Call a trusted friend or family member. Some people get support from organizations such as Narcotics Anonymous or [facility] or from treatment facilities.  If you relapse, get help as soon as you can. Some people make a plan with another person that outlines what they want that person to do for them if they relapse. The plan usually includes how to handle the relapse and who to notify in case of relapse.  Don't give up. Remember that a relapse doesn't mean that you have failed. Use the experience to learn the triggers that lead you to drink or use drugs. Then quit again. Recovery is a lifelong process. Many people have several relapses before they are able to quit for good.  Follow-up care is a key part of your treatment and safety. Be sure to make and go to all appointments, and call your doctor if you are having problems. It's also a good idea to know your test results and keep a list of the medicines you take.  When should  "you call for help?   Call 911  anytime you think you may need emergency care. For example, call if you or someone else:    Has overdosed or has withdrawal signs. Be sure to tell the emergency workers that you are or someone else is using or trying to quit using drugs. Overdose or withdrawal signs may include:  Losing consciousness.  Seizure.  Seeing or hearing things that aren't there (hallucinations).     Is thinking or talking about suicide or harming others.   Where to get help 24 hours a day, 7 days a week   If you or someone you know talks about suicide, self-harm, a mental health crisis, a substance use crisis, or any other kind of emotional distress, get help right away. You can:    Call the Suicide and Crisis Lifeline at 988.     Call 3-309-574-KCFV (1-700.642.8931).     Text HOME to 159413 to access the Crisis Text Line.   Consider saving these numbers in your phone.  Go to [employer] for more information or to chat online.  Call your doctor now or seek immediate medical care if:    You are having withdrawal symptoms. These may include nausea or vomiting, sweating, shakiness, and anxiety.   Watch closely for changes in your health, and be sure to contact your doctor if:    You have a relapse.     You need more help or support to stop.   Where can you learn more?  Go to https://www.[employer].net/patiented  Enter H573 in the search box to learn more about \"Substance Use Disorder: Care Instructions.\"  Current as of: November 15, 2023               Content Version: 14.0    4104-1196 [employer].   Care instructions adapted under license by your healthcare professional. If you have questions about a medical condition or this instruction, always ask your healthcare professional. [employer] disclaims any warranty or liability for your use of this information.      "

## 2024-05-07 NOTE — TELEPHONE ENCOUNTER
"Yes, he has seen Neurology on 3/13//24 - but we are looking for \" Neuropsychology \" which he is supposed to get scheduled for TESTING on memory issues. Unsure why he is unable to get to see them till 1/2025 ?    Thank you  Susanna Acosta MD on 5/7/2024 at 9:59 AM          "

## 2024-05-07 NOTE — TELEPHONE ENCOUNTER
FYI - Status Update    Who is Calling: patient    Update: Wanted to let you know that he did go see     He just wanted to let you know that. He said that he had an appointment in September, I told him to check with the office because it looks like it was canceled. He said that he is going to check with them.

## 2024-05-08 ENCOUNTER — TELEPHONE (OUTPATIENT)
Dept: FAMILY MEDICINE | Facility: CLINIC | Age: 74
End: 2024-05-08
Payer: MEDICARE

## 2024-05-08 LAB — HBA1C MFR BLD: 5.5 % (ref 0–5.6)

## 2024-05-08 NOTE — TELEPHONE ENCOUNTER
Pt returned call to the clinic.  Relayed provider result note below. Also relayed normal A1C result.  He verbalized understanding and declined further questions at this time.    Thank you,  Lenore Haro RN

## 2024-05-08 NOTE — TELEPHONE ENCOUNTER
----- Message from Susanna Acosta MD sent at 5/7/2024  9:55 PM CDT -----  Your Cholesterol at goal. Continue current medication.     All your OTHER labs normal except borderline elevated fasting glucose, you may see some highlighted which do not have Clinical significance.    I have added diabetes check to blood in lab and update you further once reported.     Susanna Acosta MD on 5/7/2024

## 2024-06-20 ENCOUNTER — OFFICE VISIT (OUTPATIENT)
Dept: FAMILY MEDICINE | Facility: CLINIC | Age: 74
End: 2024-06-20
Payer: MEDICARE

## 2024-06-20 DIAGNOSIS — L57.0 AK (ACTINIC KERATOSIS): ICD-10-CM

## 2024-06-20 DIAGNOSIS — L81.4 LENTIGINES: ICD-10-CM

## 2024-06-20 DIAGNOSIS — D22.9 MULTIPLE BENIGN NEVI: ICD-10-CM

## 2024-06-20 DIAGNOSIS — L82.1 SEBORRHEIC KERATOSES: ICD-10-CM

## 2024-06-20 DIAGNOSIS — Z85.828 HISTORY OF NONMELANOMA SKIN CANCER: ICD-10-CM

## 2024-06-20 DIAGNOSIS — D49.2 NEOPLASM OF SKIN: Primary | ICD-10-CM

## 2024-06-20 DIAGNOSIS — D18.01 CHERRY ANGIOMA: ICD-10-CM

## 2024-06-20 DIAGNOSIS — D03.39: ICD-10-CM

## 2024-06-20 PROCEDURE — 99213 OFFICE O/P EST LOW 20 MIN: CPT | Mod: 25 | Performed by: PHYSICIAN ASSISTANT

## 2024-06-20 PROCEDURE — 17003 DESTRUCT PREMALG LES 2-14: CPT | Performed by: PHYSICIAN ASSISTANT

## 2024-06-20 PROCEDURE — 11102 TANGNTL BX SKIN SINGLE LES: CPT | Performed by: PHYSICIAN ASSISTANT

## 2024-06-20 PROCEDURE — 17000 DESTRUCT PREMALG LESION: CPT | Mod: XS | Performed by: PHYSICIAN ASSISTANT

## 2024-06-20 PROCEDURE — 88305 TISSUE EXAM BY PATHOLOGIST: CPT | Mod: 26 | Performed by: DERMATOLOGY

## 2024-06-20 NOTE — LETTER
6/20/2024      Richard Kitchen  [address] [place] [address]      Dear Colleague,    Thank you for referring your patient, Richard Kitchen, to the [facility] [place]. Please see a copy of my visit note below.    [facility] Dermatology Note  Encounter Date: Jun 20, 2024  Office Visit      Dermatology Problem List:  FBSE: 6/20/24    #NUB R neck, S/p Biopsy performed on 6/20/24: Pending results  Hx melanoma  - Lentigo Maligna of R ala - bx 9/18/18 - s/p MMS with skin graft (outside derm clinic)  Hx of NMSC  - BCC, Left Mid back s/p ED& C 1/18/24  - BCC, L scapular back, S/p ED&C 1/18/24  - BCC, L jawline, s/p Mohs and linear repair 9/18/23  - Several Mohs in history  3. AK, S/p Cryo   - AK, bx proven - L lateral thigh S/p cryo 1/18/24  ____________________________________________    Assessment & Plan:  # Neoplasm of unspecified behavior of the skin (D49.2) on the R neck. The differential diagnosis includes BCC vs other.   - Shave biopsy performed today, see procedure note below.   - Photographed today     #AK x 3   - cryotherapy performed, see procedure note below     #Hx of Melanoma  - ABCDEs: Counseled ABCDEs of melanoma: Asymmetry, Border (irregularity), Color (not uniform, changes in color), Diameter (greater than 6 mm which is about the size of a pencil eraser), and Evolving (any changes in preexisting moles).  - Sun protection: Counseled SPF30+ sunscreen, UPF clothing, sun avoidance, tanning bed avoidance.   - Recommended yearly dental, ophthalmology,  - Recommended skin exams for all first-degree relatives.  - Recommended follow up is 6 months    # Hx of NMSC  - ABCDEs: Counseled ABCDEs of melanoma: Asymmetry, Border (irregularity), Color (not uniform, changes in color), Diameter (greater than 6 mm which is about the size of a pencil eraser), and Evolving (any changes in preexisting moles).  - Sun protection: Counseled SPF30+ sunscreen, UPF clothing, sun avoidance,  tanning bed avoidance.  - Recommended regular skin checks.      # Benign findings: multiple benign nevi, lentigines, cherry angiomas, SKs  - edu on benign etiology  - Signs and Symptoms of non-melanoma skin cancer and ABCDEs of melanoma reviewed with patient. Patient encouraged to perform monthly self skin exams and educated on how to perform them. UV precautions reviewed with patient. Patient was asked about new or changing moles/lesions on body.   - Sunscreen: Apply 20 minutes prior to going outdoors and reapply every two hours, when wet or sweating. We recommend using an SPF 30 or higher, and to use one that is water resistant.     - RTC for changes    Procedures Performed:   - Shave biopsy procedure note, location(s): see above. After discussion of benefits and risks including but not limited to bleeding, infection, scar, incomplete removal, recurrence, and non-diagnostic biopsy, written consent and photographs were obtained. The area was cleaned with isopropyl alcohol. 0.5mL of 1% lidocaine with epinephrine was injected to obtain adequate anesthesia of lesion(s). Shave biopsy at site(s) performed. Hemostasis was achieved with aluminium chloride. Petrolatum ointment and a sterile dressing were applied. The patient tolerated the procedure and no complications were noted. The patient was provided with verbal and written post care instructions.      CRYOTHERAPY PROCEDURE NOTE: 3 lesions in the above locations were treated with liquid nitrogen utilizing a 5-10sec thaw time. Patient was advised that the treated areas will become red, swollen, may develop a blister and then should crust and peal off in the next 1-2 weeks. Post-procedure instructions were provided.      Follow-up: pending path results    Staff and scribe:    Scribe Disclosure:   QUIANA MCMULLEN, am serving as a scribe; to document services personally performed by Annalee Spence PA-C -based on data collection and the provider's statements to me.      Provider Disclosure:  I agree with above History, Review of Systems, Physical exam and Plan.  I have reviewed the content of the documentation and have edited it as needed. I have personally performed the services documented here and the documentation accurately represents those services and the decisions I have made.      Electronically signed by:    All risks, benefits and alternatives were discussed with patient.  Patient is in agreement and understands the assessment and plan.  All questions were answered.    Annalee Spence PA-C, MPAS  [facility]: Phone: [phone], Fax: [phone]  [facility]: Phone: [phone],  Fax: [phone]  [facility]: Phone: [phone], Fax: [phone]  ____________________________________________    CC: Skin Check (FBSC)      Reviewed patients past medical history and pertinent chart review prior to patient's visit today.     HPI:  Mr. Richard Kitchen is a 74 year old male who presents today as a return patient for FBSC.     Today patient did not report any spots of concern.     Has a hx of NMSC    Patient is otherwise feeling well, without additional concerns.    Labs:  N/A    Physical Exam:  Vitals: There were no vitals taken for this visit.  SKIN: Total skin excluding the undergarment areas was performed. The exam included the head/face, neck, both arms, chest, back, abdomen, both legs, digits and/or nails.    - Daugherty's skin type II, has <100 nevi  - There are dome shaped bright red papules on the trunk.   - Multiple regular brown pigmented macules and papules are identified on the trunk and extremities.   - Scattered brown macules on sun exposed areas.  - There are waxy stuck on tan to brown papules on the trunk.   - There is/are erythematous macules with overlying adherent scale on the x 1 R temple,.  X 1 R cheek, x 1 L forehead, x 1 L temple,   - R  neck there is a 5 mm pink papule with white streaking.   - No other lesions of concern on areas examined.   LYMPH: Examination of the pre/post auricular, occipital, cervical, clavicular, axillary and inguinal lymph nodes was negative.        Medications:  Current Outpatient Medications   Medication Sig Dispense Refill     ACE/ARB/ARNI NOT PRESCRIBED (INTENTIONAL) Please choose reason not prescribed from choices below.       atorvastatin (LIPITOR) 40 MG tablet Take 1 tablet (40 mg) by mouth daily 90 tablet 0     No current facility-administered medications for this visit.      Past Medical/Surgical History:   Patient Active Problem List   Diagnosis     Pure hypercholesterolemia     Sensorineural hearing loss (SNHL) of both ears     Malignant melanoma (H)     Bradycardia     Diminished hearing, bilateral     Renal insufficiency syndrome     Syncope     Elevated prostate specific antigen (PSA)     Stage 3a chronic kidney disease (H)     Need for hepatitis C screening test     Grief reaction with prolonged bereavement     Memory difficulties     History of nonmelanoma skin cancer     Seborrheic keratoses     Cherry angioma     Lentigines     Multiple benign nevi     AK (actinic keratosis)     Thrombocytopenia (H24)     Past Medical History:   Diagnosis Date     Hernia, abdominal      Malignant melanoma (H)      Malignant melanoma (H) 03/15/2022     Mumps      Skin cancer                         Again, thank you for allowing me to participate in the care of your patient.        Sincerely,        Annalee Spence PA-C

## 2024-06-20 NOTE — PATIENT INSTRUCTIONS
Wound Care After a Biopsy    What is a skin biopsy?  A skin biopsy allows the doctor to examine a very small piece of tissue under the microscope to determine the diagnosis and the best treatment for the skin condition. A local anesthetic (numbing medicine)  is injected with a very small needle into the skin area to be tested. A small piece of skin is taken from the area. Sometimes a suture (stitch) is used.     What are the risks of a skin biopsy?  I will experience scar, bleeding, swelling, pain, crusting and redness. I may experience incomplete removal or recurrence. Risks of this procedure are excessive bleeding, bruising, infection, nerve damage, numbness, thick (hypertrophic or keloidal) scar and non-diagnostic biopsy.    How should I care for my wound for the first 24 hours?  Keep the wound dry and covered for 24 hours  If it bleeds, hold direct pressure on the area for 15 minutes. If bleeding does not stop then go to the emergency room  Avoid strenuous exercise the first 1-2 days or as your doctor instructs you    How should I care for the wound after 24 hours?  After 24 hours, remove the bandage  You may bathe or shower as normal  If you had a scalp biopsy, you can shampoo as usual and can use shower water to clean the biopsy site daily  Clean the wound twice a day with gentle soap and water  Do not scrub, be gentle  Apply white petroleum/Vaseline after cleaning the wound with a cotton swab or a clean finger, and keep the site covered with a Bandaid /bandage. Bandages are not necessary with a scalp biopsy  If you are unable to cover the site with a Bandaid /bandage, re-apply ointment 2-3 times a day to keep the site moist. Moisture will help with healing  Avoid strenuous activity for first 1-2 days  Avoid lakes, rivers, pools, and oceans until the stitches are removed or the site is healed    How do I clean my wound?  Wash hands thoroughly with soap or use hand  before all wound care  Clean the  wound with gentle soap and water  Apply white petroleum/Vaseline  to wound after it is clean  Replace the Bandaid /bandage to keep the wound covered for the first few days or as instructed by your doctor  If you had a scalp biopsy, warm shower water to the area on a daily basis should suffice    What should I use to clean my wound?   Cotton-tipped applicators (Qtips )  White petroleum jelly (Vaseline ). Use a clean new container and use Q-tips to apply.  Bandaids   as needed  Gentle soap     How should I care for my wound long term?  Do not get your wound dirty  Keep up with wound care for one week or until the area is healed.  A small scab will form and fall off by itself when the area is completely healed. The area will be red and will become pink in color as it heals. Sun protection is very important for how your scar will turn out. Sunscreen with an SPF 30 or greater is recommended once the area is healed.  If you have stitches, stitches need to be removed in 10 days. You may return to our clinic for this or you may have it done locally at your doctor s office.  You should have some soreness but it should be mild and slowly go away over several days. Talk to your doctor about using tylenol for pain,    When should I call my doctor?  If you have increased:   Pain or swelling  Pus or drainage (clear or slightly yellow drainage is ok)  Temperature over 100F  Spreading redness or warmth around wound    When will I hear about my results?  The biopsy results can take 2-3 weeks to come back. The clinic will call you with the results, send you a Riboxxt message, or have you schedule a follow-up clinic or phone time to discuss the results. Contact our clinics if you do not hear from us in 3 weeks.     Who should I call with questions?  [facility]: [phone]   [facility]: [phone]  For urgent needs outside of business hours call the [facility]  at [phone] and ask for the dermatology resident on call    Cryotherapy    What is it?  Use of a very cold liquid, such as liquid nitrogen, to freeze and destroy abnormal skin cells that need to be removed    What should I expect?  Tenderness and redness  A small blister that might grow and fill with dark purple blood. There may be crusting.  More than one treatment may be needed if the lesions do not go away.    How do I care for the treated area?  Gently wash the area with your hands when bathing.  Use a thin layer of Vaseline to help with healing. You may use a Band-Aid.   The area should heal within 7-10 days and may leave behind a pink or lighter color.   Do not use an antibiotic or Neosporin ointment.   You may take acetaminophen (Tylenol) for pain.     Call your Doctor if you have:  Severe pain  Signs of infection (warmth, redness, cloudy yellow drainage, and or a bad smell)  Questions or concerns    Who should I call with questions?      [facility]: [phone]      [facility]: [phone]      For urgent needs outside of business hours call the [facility] at [phone]        and ask for the dermatology resident on call      Patient Education       Proper skin care from [facility]:    -Eliminate harsh soaps as they strip the natural oils from the skin, often resulting in dry itchy skin ( i.e. Dial, Zest, Hungarian Spring)  -Use mild soaps such as Cetaphil or Dove Sensitive Skin in the shower. You do not need to use soap on arms, legs, and trunk every time you shower unless visibly soiled.   -Avoid hot or cold showers.  -After showering, lightly dry off and apply moisturizing within 2-3 minutes. This will help trap moisture in the skin.   -Aggressive use of a moisturizer at least 1-2 times a day to the entire body (including -Vanicream, Cetaphil, Aquaphor or Cerave) and moisturize hands after every washing.  -We recommend  using moisturizers that come in a tub that needs to be scooped out, not a pump. This has more of an oil base. It will hold moisture in your skin much better than a water base moisturizer. The above recommended are non-pore clogging.      Wear a sunscreen with at least SPF 30 on your face, ears, neck and V of the chest daily. Wear sunscreen on other areas of the body if those areas are exposed to the sun throughout the day. Sunscreens can contain physical and/or chemical blockers. Physical blockers are less likely to clog pores, these include zinc oxide and titanium dioxide. Reapply every two hour and after swimming.     Sunscreen examples: https://www.ewg.org/sunscreen/    UV radiation  UVA radiation remains constant throughout the day and throughout the year. It is a longer wavelength than UVB and therefore penetrates deeper into the skin leading to immediate and delayed tanning, photoaging, and skin cancer. 70-80% of UVA and UVB radiation occurs between the hours of 10am-2pm.  UVB radiation  UVB radiation causes the most harmful effects and is more significant during the summer months. However, snow and ice can reflect UVB radiation leading to skin damage during the winter months as well. UVB radiation is responsible for tanning, burning, inflammation, delayed erythema (pinkness), pigmentation (brown spots), and skin cancer.     I recommend self monthly full body exams and yearly full body exams with a dermatology provider. If you develop a new or changing lesion please follow up for examination. Most skin cancers are pink and scaly or pink and pearly. However, we do see blue/brown/black skin cancers.  Consider the ABCDEs of melanoma when giving yourself your monthly full body exam ( don't forget the groin, buttocks, feet, toes, etc). A-asymmetry, B-borders, C-color, D-diameter, E-elevation or evolving. If you see any of these changes please follow up in clinic. If you cannot see your back I recommend purchasing a  hand held mirror to use with a larger wall mirror.       Checking for Skin Cancer  You can find cancer early by checking your skin each month. There are 3 kinds of skin cancer. They are melanoma, basal cell carcinoma, and squamous cell carcinoma. Doing monthly skin checks is the best way to find new marks or skin changes. Follow the instructions below for checking your skin.   The ABCDEs of checking moles for melanoma   Check your moles or growths for signs of melanoma using ABCDE:   Asymmetry: the sides of the mole or growth don t match  Border: the edges are ragged, notched, or blurred  Color: the color within the mole or growth varies  Diameter: the mole or growth is larger than 6 mm (size of a pencil eraser)  Evolving: the size, shape, or color of the mole or growth is changing (evolving is not shown in the images below)    Checking for other types of skin cancer  Basal cell carcinoma or squamous cell carcinoma have symptoms such as:     A spot or mole that looks different from all other marks on your skin  Changes in how an area feels, such as itching, tenderness, or pain  Changes in the skin's surface, such as oozing, bleeding, or scaliness  A sore that does not heal  New swelling or redness beyond the border of a mole    Who s at risk?  Anyone can get skin cancer. But you are at greater risk if you have:   Fair skin, light-colored hair, or light-colored eyes  Many moles or abnormal moles on your skin  A history of sunburns from sunlight or tanning beds  A family history of skin cancer  A history of exposure to radiation or chemicals  A weakened immune system  If you have had skin cancer in the past, you are at risk for recurring skin cancer.   How to check your skin  Do your monthly skin checkups in front of a full-length mirror. Check all parts of your body, including your:   Head (ears, face, neck, and scalp)  Torso (front, back, and sides)  Arms (tops, undersides, upper, and lower armpits)  Hands (palms,  backs, and fingers, including under the nails)  Buttocks and genitals  Legs (front, back, and sides)  Feet (tops, soles, toes, including under the nails, and between toes)  If you have a lot of moles, take digital photos of them each month. Make sure to take photos both up close and from a distance. These can help you see if any moles change over time.   Most skin changes are not cancer. But if you see any changes in your skin, call your doctor right away. Only he or she can diagnose a problem. If you have skin cancer, seeing your doctor can be the first step toward getting the treatment that could save your life.   [email or web address] last reviewed this educational content on 4/1/2019 2000-2020 [employer]. [address] [place] [address], [place] [address]. All rights reserved. This information is not intended as a substitute for professional medical care. Always follow your healthcare professional's instructions.       When should I call my doctor?  If you are worsening or not improving, please, contact us or seek urgent care as noted below.     Who should I call with questions (adults)?  [facility] (adult and pediatric): [phone]  [facility] (adult): [phone]  [facility] ([place], [facility] and [place]) [phone]  For urgent needs outside of business hours call the [facility] at [phone] and ask for the dermatology resident on call to be paged  If this is a medical emergency and you are unable to reach an ER, Call [phone]      If you need a prescription refill, please contact your pharmacy. Refills are approved or denied by our Physicians during normal business hours, Monday through Fridays  Per office policy, refills will not be granted if you have not been seen within the past year (or sooner depending on your child's condition)

## 2024-06-20 NOTE — PROGRESS NOTES
[facility] Dermatology Note  Encounter Date: Jun 20, 2024  Office Visit      Dermatology Problem List:  FBSE: 6/20/24    #NUB R neck, S/p Biopsy performed on 6/20/24: Pending results  Hx melanoma  - Lentigo Maligna of R ala - bx 9/18/18 - s/p MMS with skin graft (outside derm clinic)  Hx of NMSC  - BCC, Left Mid back s/p ED& C 1/18/24  - BCC, L scapular back, S/p ED&C 1/18/24  - BCC, L jawline, s/p Mohs and linear repair 9/18/23  - Several Mohs in history  3. AK, S/p Cryo   - AK, bx proven - L lateral thigh S/p cryo 1/18/24  ____________________________________________    Assessment & Plan:  # Neoplasm of unspecified behavior of the skin (D49.2) on the R neck. The differential diagnosis includes BCC vs other.   - Shave biopsy performed today, see procedure note below.   - Photographed today     #AK x 3   - cryotherapy performed, see procedure note below     #Hx of Melanoma  - ABCDEs: Counseled ABCDEs of melanoma: Asymmetry, Border (irregularity), Color (not uniform, changes in color), Diameter (greater than 6 mm which is about the size of a pencil eraser), and Evolving (any changes in preexisting moles).  - Sun protection: Counseled SPF30+ sunscreen, UPF clothing, sun avoidance, tanning bed avoidance.   - Recommended yearly dental, ophthalmology,  - Recommended skin exams for all first-degree relatives.  - Recommended follow up is 6 months    # Hx of NMSC  - ABCDEs: Counseled ABCDEs of melanoma: Asymmetry, Border (irregularity), Color (not uniform, changes in color), Diameter (greater than 6 mm which is about the size of a pencil eraser), and Evolving (any changes in preexisting moles).  - Sun protection: Counseled SPF30+ sunscreen, UPF clothing, sun avoidance, tanning bed avoidance.  - Recommended regular skin checks.      # Benign findings: multiple benign nevi, lentigines, cherry angiomas, SKs  - edu on benign etiology  - Signs and Symptoms of non-melanoma skin cancer and ABCDEs of melanoma  reviewed with patient. Patient encouraged to perform monthly self skin exams and educated on how to perform them. UV precautions reviewed with patient. Patient was asked about new or changing moles/lesions on body.   - Sunscreen: Apply 20 minutes prior to going outdoors and reapply every two hours, when wet or sweating. We recommend using an SPF 30 or higher, and to use one that is water resistant.     - RTC for changes    Procedures Performed:   - Shave biopsy procedure note, location(s): see above. After discussion of benefits and risks including but not limited to bleeding, infection, scar, incomplete removal, recurrence, and non-diagnostic biopsy, written consent and photographs were obtained. The area was cleaned with isopropyl alcohol. 0.5mL of 1% lidocaine with epinephrine was injected to obtain adequate anesthesia of lesion(s). Shave biopsy at site(s) performed. Hemostasis was achieved with aluminium chloride. Petrolatum ointment and a sterile dressing were applied. The patient tolerated the procedure and no complications were noted. The patient was provided with verbal and written post care instructions.      CRYOTHERAPY PROCEDURE NOTE: 3 lesions in the above locations were treated with liquid nitrogen utilizing a 5-10sec thaw time. Patient was advised that the treated areas will become red, swollen, may develop a blister and then should crust and peal off in the next 1-2 weeks. Post-procedure instructions were provided.      Follow-up: pending path results    Staff and scribe:    Scribe Disclosure:   I, QUIANA ALFRED, am serving as a scribe; to document services personally performed by Annalee Spence PA-C -based on data collection and the provider's statements to me.     Provider Disclosure:  I agree with above History, Review of Systems, Physical exam and Plan.  I have reviewed the content of the documentation and have edited it as needed. I have personally performed the services documented here and  the documentation accurately represents those services and the decisions I have made.      Electronically signed by:    All risks, benefits and alternatives were discussed with patient.  Patient is in agreement and understands the assessment and plan.  All questions were answered.    Annalee Spence PA-C, MPAS  [facility] [place]: Phone: [phone], Fax: [phone]  [facility]: Phone: [phone],  Fax: [phone]  [facility]: Phone: [phone], Fax: [phone]  ____________________________________________    CC: Skin Check (FBSC)      Reviewed patients past medical history and pertinent chart review prior to patient's visit today.     HPI:  Mr. Richard Kitchen is a 74 year old male who presents today as a return patient for FBSC.     Today patient did not report any spots of concern.     Has a hx of NMSC    Patient is otherwise feeling well, without additional concerns.    Labs:  N/A    Physical Exam:  Vitals: There were no vitals taken for this visit.  SKIN: Total skin excluding the undergarment areas was performed. The exam included the head/face, neck, both arms, chest, back, abdomen, both legs, digits and/or nails.    - Daugherty's skin type II, has <100 nevi  - There are dome shaped bright red papules on the trunk.   - Multiple regular brown pigmented macules and papules are identified on the trunk and extremities.   - Scattered brown macules on sun exposed areas.  - There are waxy stuck on tan to brown papules on the trunk.   - There is/are erythematous macules with overlying adherent scale on the x 1 R temple,.  X 1 R cheek, x 1 L forehead, x 1 L temple,   - R neck there is a 5 mm pink papule with white streaking.   - No other lesions of concern on areas examined.   LYMPH: Examination of the pre/post auricular, occipital, cervical, clavicular, axillary and inguinal lymph nodes was  negative.        Medications:  Current Outpatient Medications   Medication Sig Dispense Refill    ACE/ARB/ARNI NOT PRESCRIBED (INTENTIONAL) Please choose reason not prescribed from choices below.      atorvastatin (LIPITOR) 40 MG tablet Take 1 tablet (40 mg) by mouth daily 90 tablet 0     No current facility-administered medications for this visit.      Past Medical/Surgical History:   Patient Active Problem List   Diagnosis    Pure hypercholesterolemia    Sensorineural hearing loss (SNHL) of both ears    Malignant melanoma (H)    Bradycardia    Diminished hearing, bilateral    Renal insufficiency syndrome    Syncope    Elevated prostate specific antigen (PSA)    Stage 3a chronic kidney disease (H)    Need for hepatitis C screening test    Grief reaction with prolonged bereavement    Memory difficulties    History of nonmelanoma skin cancer    Seborrheic keratoses    Cherry angioma    Lentigines    Multiple benign nevi    AK (actinic keratosis)    Thrombocytopenia (H24)     Past Medical History:   Diagnosis Date    Hernia, abdominal     Malignant melanoma (H)     Malignant melanoma (H) 03/15/2022    Mumps     Skin cancer

## 2024-06-28 ENCOUNTER — TELEPHONE (OUTPATIENT)
Dept: FAMILY MEDICINE | Facility: CLINIC | Age: 74
End: 2024-06-28
Payer: MEDICARE

## 2024-06-28 NOTE — TELEPHONE ENCOUNTER
Patient Contact    Attempt # 1    Was call answered?  No    Left a voicemail asking patient to give us a call back at our main dermatology line at [phone]    Tashia TROY RN BSN  [facility]  [phone]

## 2024-06-28 NOTE — TELEPHONE ENCOUNTER
----- Message from Annalee Spence sent at 6/28/2024 11:33 AM CDT -----    Right neck:  - Hypertrophic actinic keratosis     No my chart - needs to come back for Ln2, HAK on the neck. Please schedule. Thanks!

## 2024-07-01 NOTE — TELEPHONE ENCOUNTER
S/w pt and went over Annalee's message below about path results.  Scheduled on Monday July 15th at 8 am at [facility] for LN2 tx on neck.    Mariela GOOD RN  [facility] [place]  [phone]

## 2024-07-15 ENCOUNTER — OFFICE VISIT (OUTPATIENT)
Dept: FAMILY MEDICINE | Facility: CLINIC | Age: 74
End: 2024-07-15
Payer: MEDICARE

## 2024-07-15 DIAGNOSIS — L57.0 HYPERTROPHIC ACTINIC KERATOSIS: Primary | ICD-10-CM

## 2024-07-15 PROCEDURE — 17000 DESTRUCT PREMALG LESION: CPT | Performed by: PHYSICIAN ASSISTANT

## 2024-07-15 ASSESSMENT — PAIN SCALES - GENERAL: PAINLEVEL: NO PAIN (0)

## 2024-07-15 NOTE — PROGRESS NOTES
[facility] D[facility]at[facility] Note  Encounter Date: Jul 15, 2024  Office Visit      Dermatology Problem List:  FBSE: 6/20/24    1. 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 Hx of NMSC  - BCC, Left Mid back s/p ED& C 1/18/24  - BCC, L scapular back, S/p ED&C 1/18/24  - BCC, L jawline, s/p Mohs and linear repair 9/18/23  - Several Mohs in history  3. AK, S/p Cryo   - AK, bx proven - L lateral thigh - S/p cryo 1/18/24  - HAK - bx proven 6/20/24 - LN2 7/15/24  ____________________________________________    Assessment & Plan:   # HAK - bx proven on the R neck - here for cryo today  - cryo performed - see procedure below    Procedures Performed:     CRYOTHERAPY PROCEDURE NOTE: 1 lesions at site of previously biopsy on right neck was treated with liquid nitrogen utilizing a 5-10sec thaw time. Patient was advised that the treated areas will become red, swollen, may develop a blister and then should crust and peal off in the next 1-2 weeks. Post-procedure instructions were provided.    Follow-up: 6mo for FBSE    Provider Disclosure:   The documentation recorded by the scribe accurately reflects the services I personally performed and the decisions made by me.    Note completed by ALONSO Brice    All risks, benefits and alternatives were discussed with patient.  Patient is in agreement and understands the assessment and plan.  All questions were answered.    Annalee Spence PA-C, MPAS  [facility] [place]: Phone: [phone], Fax: [phone]  [facility]: Phone: [phone],  Fax: [phone]  [facility]: Phone: [phone], Fax: [phone]  ____________________________________________    CC: Derm Problem (Here to have cryo done on neck)    Reviewed patients past medical history and pertinent chart review prior to patient's visit  today.     HPI:  Mr. Richard Kitchen is a 74 year old male who presents today as a return patient for treatment of a bx proven HAK on the neck    Has a hx of NMSC and melanoma.     Patient is otherwise feeling well, without additional concerns.    Labs:  N/A    Physical Exam:  Vitals: There were no vitals taken for this visit.  SKIN:   - Focused exam of right neck was completed  -Well healed biopsy scar on right neck  - No other lesions of concern on areas examined.     Medications:  Current Outpatient Medications   Medication Sig Dispense Refill    ACE/ARB/ARNI NOT PRESCRIBED (INTENTIONAL) Please choose reason not prescribed from choices below.      atorvastatin (LIPITOR) 40 MG tablet Take 1 tablet (40 mg) by mouth daily 90 tablet 0     No current facility-administered medications for this visit.      Past Medical/Surgical History:   Patient Active Problem List   Diagnosis    Pure hypercholesterolemia    Sensorineural hearing loss (SNHL) of both ears    Malignant melanoma (H)    Bradycardia    Diminished hearing, bilateral    Renal insufficiency syndrome    Syncope    Elevated prostate specific antigen (PSA)    Stage 3a chronic kidney disease (H)    Need for hepatitis C screening test    Grief reaction with prolonged bereavement    Memory difficulties    History of nonmelanoma skin cancer    Seborrheic keratoses    Cherry angioma    Lentigines    Multiple benign nevi    AK (actinic keratosis)    Thrombocytopenia (H24)     Past Medical History:   Diagnosis Date    Hernia, abdominal     Malignant melanoma (H)     Malignant melanoma (H) 03/15/2022    Mumps     Skin cancer

## 2024-07-15 NOTE — PATIENT INSTRUCTIONS
Cryotherapy    What is it?  Use of a very cold liquid, such as liquid nitrogen, to freeze and destroy abnormal skin cells that need to be removed    What should I expect?  Tenderness and redness  A small blister that might grow and fill with dark purple blood. There may be crusting.  More than one treatment may be needed if the lesions do not go away.    How do I care for the treated area?  Gently wash the area with your hands when bathing.  Use a thin layer of Vaseline to help with healing. You may use a Band-Aid.   The area should heal within 7-10 days and may leave behind a pink or lighter color.   Do not use an antibiotic or Neosporin ointment.   You may take acetaminophen (Tylenol) for pain.     Call your Doctor if you have:  Severe pain  Signs of infection (warmth, redness, cloudy yellow drainage, and or a bad smell)  Questions or concerns    Who should I call with questions?      [facility]: [phone]      [facility]: [phone]      For urgent needs outside of business hours call the [facility] at [phone]        and ask for the dermatology resident on call

## 2024-07-15 NOTE — LETTER
7/15/2024      Richard Aguilarally  [address] [place] [address]      Dear Colleague,    Thank you for referring your patient, Richard Kitchen, to the [facility] [place]. Please see a copy of my visit note below.    [facility] Dermatology Note  Encounter Date: Jul 15, 2024  Office Visit      Dermatology Problem List:  FBSE: 6/20/24    1. 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 Hx of NMSC  - BCC, Left Mid back s/p ED& C 1/18/24  - BCC, L scapular back, S/p ED&C 1/18/24  - BCC, L jawline, s/p Mohs and linear repair 9/18/23  - Several Mohs in history  3. AK, S/p Cryo   - AK, bx proven - L lateral thigh - S/p cryo 1/18/24  - HAK - bx proven 6/20/24 - LN2 7/15/24  ____________________________________________    Assessment & Plan:   # HAK - bx proven on the R neck - here for cryo today  - cryo performed - see procedure below    Procedures Performed:     CRYOTHERAPY PROCEDURE NOTE: 1 lesions at site of previously biopsy on right neck was treated with liquid nitrogen utilizing a 5-10sec thaw time. Patient was advised that the treated areas will become red, swollen, may develop a blister and then should crust and peal off in the next 1-2 weeks. Post-procedure instructions were provided.    Follow-up: 6mo for FBSE    Provider Disclosure:   The documentation recorded by the scribe accurately reflects the services I personally performed and the decisions made by me.    Note completed by ALONSO Brice    All risks, benefits and alternatives were discussed with patient.  Patient is in agreement and understands the assessment and plan.  All questions were answered.    Annalee Spence PA-C, MPAS  [facility]: Phone: [phone], Fax: [phone]  [facility]: Phone: [phone],  Fax: [phone]  [facility]  [place]: Phone: [phone], Fax: [phone]  ____________________________________________    CC: Derm Problem (Here to have cryo done on neck)    Reviewed patients past medical history and pertinent chart review prior to patient's visit today.     HPI:  Mr. Richard Kitchen is a 74 year old male who presents today as a return patient for treatment of a bx proven HAK on the neck    Has a hx of NMSC and melanoma.     Patient is otherwise feeling well, without additional concerns.    Labs:  N/A    Physical Exam:  Vitals: There were no vitals taken for this visit.  SKIN:   - Focused exam of right neck was completed  -Well healed biopsy scar on right neck  - No other lesions of concern on areas examined.     Medications:  Current Outpatient Medications   Medication Sig Dispense Refill     ACE/ARB/ARNI NOT PRESCRIBED (INTENTIONAL) Please choose reason not prescribed from choices below.       atorvastatin (LIPITOR) 40 MG tablet Take 1 tablet (40 mg) by mouth daily 90 tablet 0     No current facility-administered medications for this visit.      Past Medical/Surgical History:   Patient Active Problem List   Diagnosis     Pure hypercholesterolemia     Sensorineural hearing loss (SNHL) of both ears     Malignant melanoma (H)     Bradycardia     Diminished hearing, bilateral     Renal insufficiency syndrome     Syncope     Elevated prostate specific antigen (PSA)     Stage 3a chronic kidney disease (H)     Need for hepatitis C screening test     Grief reaction with prolonged bereavement     Memory difficulties     History of nonmelanoma skin cancer     Seborrheic keratoses     Cherry angioma     Lentigines     Multiple benign nevi     AK (actinic keratosis)     Thrombocytopenia (H24)     Past Medical History:   Diagnosis Date     Hernia, abdominal      Malignant melanoma (H)      Malignant melanoma (H) 03/15/2022     Mumps      Skin cancer                         Again, thank you for allowing me to participate in the care of  your patient.        Sincerely,        Annalee Spence PA-C

## 2024-08-10 NOTE — TELEPHONE ENCOUNTER
Patient Contact    Attempt # 1    Was call answered?  No.  Left message on voicemail with information to call nurse back at [phone].     Mariela GOOD RN  [facility] [place]  [phone]         Yes

## 2024-08-26 ENCOUNTER — APPOINTMENT (OUTPATIENT)
Dept: CT IMAGING | Facility: CLINIC | Age: 74
End: 2024-08-26
Attending: EMERGENCY MEDICINE
Payer: MEDICARE

## 2024-08-26 ENCOUNTER — HOSPITAL ENCOUNTER (OUTPATIENT)
Facility: CLINIC | Age: 74
Setting detail: OBSERVATION
Discharge: HOME | End: 2024-08-27
Attending: EMERGENCY MEDICINE | Admitting: HOSPITALIST
Payer: MEDICARE

## 2024-08-26 ENCOUNTER — APPOINTMENT (OUTPATIENT)
Dept: MRI IMAGING | Facility: CLINIC | Age: 74
End: 2024-08-26
Attending: STUDENT IN AN ORGANIZED HEALTH CARE EDUCATION/TRAINING PROGRAM
Payer: MEDICARE

## 2024-08-26 DIAGNOSIS — E78.5 DYSLIPIDEMIA: ICD-10-CM

## 2024-08-26 DIAGNOSIS — Z87.898 HISTORY OF MEMORY LOSS: ICD-10-CM

## 2024-08-26 DIAGNOSIS — Z00.00 ENCOUNTER FOR MEDICARE ANNUAL WELLNESS EXAM: ICD-10-CM

## 2024-08-26 DIAGNOSIS — R41.0 CONFUSION: ICD-10-CM

## 2024-08-26 DIAGNOSIS — F10.920 ALCOHOLIC INTOXICATION WITHOUT COMPLICATION (H): ICD-10-CM

## 2024-08-26 LAB
ALBUMIN SERPL BCG-MCNC: 4.6 G/DL (ref 3.5–5.2)
ALBUMIN UR-MCNC: NEGATIVE MG/DL
ALP SERPL-CCNC: 56 U/L (ref 40–150)
ALT SERPL W P-5'-P-CCNC: 25 U/L (ref 0–70)
ANION GAP SERPL CALCULATED.3IONS-SCNC: 11 MMOL/L (ref 7–15)
APPEARANCE UR: CLEAR
APTT PPP: 37 SECONDS (ref 22–38)
AST SERPL W P-5'-P-CCNC: 24 U/L (ref 0–45)
ATRIAL RATE - MUSE: 55 BPM
BASOPHILS # BLD AUTO: 0.1 10E3/UL (ref 0–0.2)
BASOPHILS NFR BLD AUTO: 1 %
BILIRUB DIRECT SERPL-MCNC: <0.2 MG/DL (ref 0–0.3)
BILIRUB SERPL-MCNC: 0.4 MG/DL
BILIRUB UR QL STRIP: NEGATIVE
BUN SERPL-MCNC: 15.9 MG/DL (ref 8–23)
CALCIUM SERPL-MCNC: 9.9 MG/DL (ref 8.8–10.4)
CHLORIDE SERPL-SCNC: 105 MMOL/L (ref 98–107)
COLOR UR AUTO: NORMAL
CREAT SERPL-MCNC: 1.19 MG/DL (ref 0.67–1.17)
DIASTOLIC BLOOD PRESSURE - MUSE: NORMAL MMHG
EGFRCR SERPLBLD CKD-EPI 2021: 63 ML/MIN/1.73M2
EOSINOPHIL # BLD AUTO: 0.2 10E3/UL (ref 0–0.7)
EOSINOPHIL NFR BLD AUTO: 3 %
ERYTHROCYTE [DISTWIDTH] IN BLOOD BY AUTOMATED COUNT: 13.2 % (ref 10–15)
ETHANOL SERPL-MCNC: 0.25 G/DL
GLUCOSE SERPL-MCNC: 105 MG/DL (ref 70–99)
GLUCOSE UR STRIP-MCNC: NEGATIVE MG/DL
HBA1C MFR BLD: 5.8 %
HCO3 SERPL-SCNC: 27 MMOL/L (ref 22–29)
HCT VFR BLD AUTO: 44.6 % (ref 40–53)
HGB BLD-MCNC: 14.8 G/DL (ref 13.3–17.7)
HGB UR QL STRIP: NEGATIVE
IMM GRANULOCYTES # BLD: 0 10E3/UL
IMM GRANULOCYTES NFR BLD: 0 %
INR PPP: 1.04 (ref 0.85–1.15)
INTERPRETATION ECG - MUSE: NORMAL
KETONES UR STRIP-MCNC: NEGATIVE MG/DL
LEUKOCYTE ESTERASE UR QL STRIP: NEGATIVE
LYMPHOCYTES # BLD AUTO: 1.6 10E3/UL (ref 0.8–5.3)
LYMPHOCYTES NFR BLD AUTO: 31 %
MAGNESIUM SERPL-MCNC: 2.6 MG/DL (ref 1.7–2.3)
MCH RBC QN AUTO: 31 PG (ref 26.5–33)
MCHC RBC AUTO-ENTMCNC: 33.2 G/DL (ref 31.5–36.5)
MCV RBC AUTO: 94 FL (ref 78–100)
MONOCYTES # BLD AUTO: 0.5 10E3/UL (ref 0–1.3)
MONOCYTES NFR BLD AUTO: 10 %
NEUTROPHILS # BLD AUTO: 2.8 10E3/UL (ref 1.6–8.3)
NEUTROPHILS NFR BLD AUTO: 54 %
NITRATE UR QL: NEGATIVE
NRBC # BLD AUTO: 0 10E3/UL
NRBC BLD AUTO-RTO: 0 /100
P AXIS - MUSE: 71 DEGREES
PH UR STRIP: 6 [PH] (ref 5–7)
PLATELET # BLD AUTO: 157 10E3/UL (ref 150–450)
POTASSIUM SERPL-SCNC: 4.2 MMOL/L (ref 3.4–5.3)
PR INTERVAL - MUSE: 200 MS
PROT SERPL-MCNC: 7.6 G/DL (ref 6.4–8.3)
QRS DURATION - MUSE: 82 MS
QT - MUSE: 436 MS
QTC - MUSE: 417 MS
R AXIS - MUSE: 32 DEGREES
RBC # BLD AUTO: 4.77 10E6/UL (ref 4.4–5.9)
SODIUM SERPL-SCNC: 143 MMOL/L (ref 135–145)
SP GR UR STRIP: 1.01 (ref 1–1.03)
SYSTOLIC BLOOD PRESSURE - MUSE: NORMAL MMHG
T AXIS - MUSE: 50 DEGREES
TROPONIN T SERPL HS-MCNC: 11 NG/L
UROBILINOGEN UR STRIP-MCNC: NORMAL MG/DL
VENTRICULAR RATE- MUSE: 55 BPM
WBC # BLD AUTO: 5.1 10E3/UL (ref 4–11)

## 2024-08-26 PROCEDURE — 81003 URINALYSIS AUTO W/O SCOPE: CPT | Performed by: EMERGENCY MEDICINE

## 2024-08-26 PROCEDURE — 250N000009 HC RX 250: Performed by: EMERGENCY MEDICINE

## 2024-08-26 PROCEDURE — 70544 MR ANGIOGRAPHY HEAD W/O DYE: CPT | Mod: MG

## 2024-08-26 PROCEDURE — 82248 BILIRUBIN DIRECT: CPT | Performed by: EMERGENCY MEDICINE

## 2024-08-26 PROCEDURE — 70549 MR ANGIOGRAPH NECK W/O&W/DYE: CPT | Mod: MG

## 2024-08-26 PROCEDURE — 85610 PROTHROMBIN TIME: CPT | Performed by: EMERGENCY MEDICINE

## 2024-08-26 PROCEDURE — 93005 ELECTROCARDIOGRAM TRACING: CPT

## 2024-08-26 PROCEDURE — 85025 COMPLETE CBC W/AUTO DIFF WBC: CPT | Performed by: EMERGENCY MEDICINE

## 2024-08-26 PROCEDURE — 99291 CRITICAL CARE FIRST HOUR: CPT | Mod: 25

## 2024-08-26 PROCEDURE — G0508 CRIT CARE TELEHEA CONSULT 60: HCPCS | Mod: G0 | Performed by: STUDENT IN AN ORGANIZED HEALTH CARE EDUCATION/TRAINING PROGRAM

## 2024-08-26 PROCEDURE — G0378 HOSPITAL OBSERVATION PER HR: HCPCS

## 2024-08-26 PROCEDURE — 70450 CT HEAD/BRAIN W/O DYE: CPT | Mod: MA,XU

## 2024-08-26 PROCEDURE — 250N000013 HC RX MED GY IP 250 OP 250 PS 637: Performed by: EMERGENCY MEDICINE

## 2024-08-26 PROCEDURE — 70553 MRI BRAIN STEM W/O & W/DYE: CPT | Mod: MG

## 2024-08-26 PROCEDURE — 250N000013 HC RX MED GY IP 250 OP 250 PS 637: Performed by: STUDENT IN AN ORGANIZED HEALTH CARE EDUCATION/TRAINING PROGRAM

## 2024-08-26 PROCEDURE — 250N000011 HC RX IP 250 OP 636: Performed by: EMERGENCY MEDICINE

## 2024-08-26 PROCEDURE — 99222 1ST HOSP IP/OBS MODERATE 55: CPT | Mod: AI | Performed by: INTERNAL MEDICINE

## 2024-08-26 PROCEDURE — 83036 HEMOGLOBIN GLYCOSYLATED A1C: CPT | Performed by: STUDENT IN AN ORGANIZED HEALTH CARE EDUCATION/TRAINING PROGRAM

## 2024-08-26 PROCEDURE — 84484 ASSAY OF TROPONIN QUANT: CPT | Performed by: EMERGENCY MEDICINE

## 2024-08-26 PROCEDURE — 80061 LIPID PANEL: CPT | Performed by: STUDENT IN AN ORGANIZED HEALTH CARE EDUCATION/TRAINING PROGRAM

## 2024-08-26 PROCEDURE — 82077 ASSAY SPEC XCP UR&BREATH IA: CPT | Performed by: EMERGENCY MEDICINE

## 2024-08-26 PROCEDURE — 80048 BASIC METABOLIC PNL TOTAL CA: CPT | Performed by: EMERGENCY MEDICINE

## 2024-08-26 PROCEDURE — 83735 ASSAY OF MAGNESIUM: CPT | Performed by: EMERGENCY MEDICINE

## 2024-08-26 PROCEDURE — 36415 COLL VENOUS BLD VENIPUNCTURE: CPT | Performed by: EMERGENCY MEDICINE

## 2024-08-26 PROCEDURE — 70496 CT ANGIOGRAPHY HEAD: CPT | Mod: MA

## 2024-08-26 PROCEDURE — 85730 THROMBOPLASTIN TIME PARTIAL: CPT | Performed by: EMERGENCY MEDICINE

## 2024-08-26 RX ORDER — MAGNESIUM OXIDE 400 MG/1
800 TABLET ORAL ONCE
Status: COMPLETED | OUTPATIENT
Start: 2024-08-26 | End: 2024-08-26

## 2024-08-26 RX ORDER — ASPIRIN 325 MG
325 TABLET ORAL ONCE
Status: COMPLETED | OUTPATIENT
Start: 2024-08-26 | End: 2024-08-26

## 2024-08-26 RX ORDER — ATORVASTATIN CALCIUM 40 MG/1
40 TABLET, FILM COATED ORAL EVERY EVENING
Status: DISCONTINUED | OUTPATIENT
Start: 2024-08-26 | End: 2024-08-27 | Stop reason: HOSPADM

## 2024-08-26 RX ORDER — IOPAMIDOL 755 MG/ML
67 INJECTION, SOLUTION INTRAVASCULAR ONCE
Status: COMPLETED | OUTPATIENT
Start: 2024-08-26 | End: 2024-08-26

## 2024-08-26 RX ORDER — MULTIVITAMIN,THERAPEUTIC
1 TABLET ORAL ONCE
Status: COMPLETED | OUTPATIENT
Start: 2024-08-26 | End: 2024-08-26

## 2024-08-26 RX ORDER — CLOPIDOGREL 300 MG/1
300 TABLET, FILM COATED ORAL ONCE
Status: COMPLETED | OUTPATIENT
Start: 2024-08-26 | End: 2024-08-26

## 2024-08-26 RX ORDER — FOLIC ACID 1 MG/1
1 TABLET ORAL ONCE
Status: COMPLETED | OUTPATIENT
Start: 2024-08-26 | End: 2024-08-26

## 2024-08-26 RX ADMIN — FOLIC ACID 1 MG: 1 TABLET ORAL at 22:29

## 2024-08-26 RX ADMIN — THIAMINE HCL TAB 100 MG 100 MG: 100 TAB at 22:29

## 2024-08-26 RX ADMIN — IOPAMIDOL 67 ML: 755 INJECTION, SOLUTION INTRAVENOUS at 20:48

## 2024-08-26 RX ADMIN — CLOPIDOGREL BISULFATE 300 MG: 300 TABLET, FILM COATED ORAL at 21:50

## 2024-08-26 RX ADMIN — ASPIRIN 325 MG ORAL TABLET 325 MG: 325 PILL ORAL at 21:50

## 2024-08-26 RX ADMIN — SODIUM CHLORIDE 100 ML: 9 INJECTION, SOLUTION INTRAVENOUS at 20:48

## 2024-08-26 RX ADMIN — ATORVASTATIN CALCIUM 40 MG: 40 TABLET, FILM COATED ORAL at 22:52

## 2024-08-26 RX ADMIN — MULTIVITAMIN TABLET 1 TABLET: TABLET at 22:28

## 2024-08-26 RX ADMIN — Medication 800 MG: at 22:29

## 2024-08-26 ASSESSMENT — ACTIVITIES OF DAILY LIVING (ADL)
ADLS_ACUITY_SCORE: 35

## 2024-08-27 ENCOUNTER — APPOINTMENT (OUTPATIENT)
Dept: CARDIOLOGY | Facility: CLINIC | Age: 74
End: 2024-08-27
Attending: INTERNAL MEDICINE
Payer: MEDICARE

## 2024-08-27 VITALS
HEART RATE: 61 BPM | OXYGEN SATURATION: 95 % | RESPIRATION RATE: 18 BRPM | DIASTOLIC BLOOD PRESSURE: 83 MMHG | SYSTOLIC BLOOD PRESSURE: 134 MMHG | TEMPERATURE: 98.1 F

## 2024-08-27 LAB
CHOLEST SERPL-MCNC: 143 MG/DL
GLUCOSE BLDC GLUCOMTR-MCNC: 116 MG/DL (ref 70–99)
GLUCOSE BLDC GLUCOMTR-MCNC: 66 MG/DL (ref 70–99)
GLUCOSE BLDC GLUCOMTR-MCNC: 76 MG/DL (ref 70–99)
HDLC SERPL-MCNC: 54 MG/DL
LDLC SERPL CALC-MCNC: 63 MG/DL
NONHDLC SERPL-MCNC: 89 MG/DL
TRIGL SERPL-MCNC: 129 MG/DL

## 2024-08-27 PROCEDURE — 999N000147 HC STATISTIC PT IP EVAL DEFER

## 2024-08-27 PROCEDURE — 255N000002 HC RX 255 OP 636: Performed by: STUDENT IN AN ORGANIZED HEALTH CARE EDUCATION/TRAINING PROGRAM

## 2024-08-27 PROCEDURE — 82962 GLUCOSE BLOOD TEST: CPT

## 2024-08-27 PROCEDURE — 93306 TTE W/DOPPLER COMPLETE: CPT | Mod: 26 | Performed by: INTERNAL MEDICINE

## 2024-08-27 PROCEDURE — G0378 HOSPITAL OBSERVATION PER HR: HCPCS

## 2024-08-27 PROCEDURE — 255N000002 HC RX 255 OP 636: Performed by: INTERNAL MEDICINE

## 2024-08-27 PROCEDURE — 99239 HOSP IP/OBS DSCHRG MGMT >30: CPT | Performed by: PHYSICIAN ASSISTANT

## 2024-08-27 PROCEDURE — A9585 GADOBUTROL INJECTION: HCPCS | Performed by: STUDENT IN AN ORGANIZED HEALTH CARE EDUCATION/TRAINING PROGRAM

## 2024-08-27 PROCEDURE — 99221 1ST HOSP IP/OBS SF/LOW 40: CPT | Mod: GC | Performed by: STUDENT IN AN ORGANIZED HEALTH CARE EDUCATION/TRAINING PROGRAM

## 2024-08-27 RX ORDER — CLOPIDOGREL BISULFATE 75 MG/1
75 TABLET ORAL DAILY
Status: DISCONTINUED | OUTPATIENT
Start: 2024-08-28 | End: 2024-08-27

## 2024-08-27 RX ORDER — PROCHLORPERAZINE MALEATE 5 MG
5 TABLET ORAL EVERY 6 HOURS PRN
Status: DISCONTINUED | OUTPATIENT
Start: 2024-08-27 | End: 2024-08-27 | Stop reason: HOSPADM

## 2024-08-27 RX ORDER — DEXTROSE MONOHYDRATE 25 G/50ML
25-50 INJECTION, SOLUTION INTRAVENOUS
Status: DISCONTINUED | OUTPATIENT
Start: 2024-08-27 | End: 2024-08-27 | Stop reason: HOSPADM

## 2024-08-27 RX ORDER — HYDRALAZINE HYDROCHLORIDE 20 MG/ML
10-20 INJECTION INTRAMUSCULAR; INTRAVENOUS
Status: DISCONTINUED | OUTPATIENT
Start: 2024-08-27 | End: 2024-08-27 | Stop reason: HOSPADM

## 2024-08-27 RX ORDER — ASPIRIN 81 MG/1
81 TABLET ORAL DAILY
Status: DISCONTINUED | OUTPATIENT
Start: 2024-08-28 | End: 2024-08-27

## 2024-08-27 RX ORDER — ONDANSETRON 4 MG/1
4 TABLET, ORALLY DISINTEGRATING ORAL EVERY 6 HOURS PRN
Status: DISCONTINUED | OUTPATIENT
Start: 2024-08-27 | End: 2024-08-27 | Stop reason: HOSPADM

## 2024-08-27 RX ORDER — LABETALOL HYDROCHLORIDE 5 MG/ML
10-40 INJECTION, SOLUTION INTRAVENOUS EVERY 10 MIN PRN
Status: DISCONTINUED | OUTPATIENT
Start: 2024-08-27 | End: 2024-08-27 | Stop reason: HOSPADM

## 2024-08-27 RX ORDER — NICOTINE POLACRILEX 4 MG
15-30 LOZENGE BUCCAL
Status: DISCONTINUED | OUTPATIENT
Start: 2024-08-27 | End: 2024-08-27 | Stop reason: HOSPADM

## 2024-08-27 RX ORDER — ASPIRIN 81 MG/1
81 TABLET, CHEWABLE ORAL DAILY
Status: DISCONTINUED | OUTPATIENT
Start: 2024-08-28 | End: 2024-08-27

## 2024-08-27 RX ORDER — PROCHLORPERAZINE 25 MG
12.5 SUPPOSITORY, RECTAL RECTAL EVERY 12 HOURS PRN
Status: DISCONTINUED | OUTPATIENT
Start: 2024-08-27 | End: 2024-08-27 | Stop reason: HOSPADM

## 2024-08-27 RX ORDER — ONDANSETRON 2 MG/ML
4 INJECTION INTRAMUSCULAR; INTRAVENOUS EVERY 6 HOURS PRN
Status: DISCONTINUED | OUTPATIENT
Start: 2024-08-27 | End: 2024-08-27 | Stop reason: HOSPADM

## 2024-08-27 RX ORDER — GADOBUTROL 604.72 MG/ML
10 INJECTION INTRAVENOUS ONCE
Status: COMPLETED | OUTPATIENT
Start: 2024-08-27 | End: 2024-08-27

## 2024-08-27 RX ADMIN — HUMAN ALBUMIN MICROSPHERES AND PERFLUTREN 3 ML: 10; .22 INJECTION, SOLUTION INTRAVENOUS at 10:06

## 2024-08-27 RX ADMIN — GADOBUTROL 10 ML: 604.72 INJECTION INTRAVENOUS at 00:36

## 2024-08-27 ASSESSMENT — ACTIVITIES OF DAILY LIVING (ADL)
ADLS_ACUITY_SCORE: 39
ADLS_ACUITY_SCORE: 37
ADLS_ACUITY_SCORE: 39
ADLS_ACUITY_SCORE: 37
ADLS_ACUITY_SCORE: 39
ADLS_ACUITY_SCORE: 37
ADLS_ACUITY_SCORE: 39
ADLS_ACUITY_SCORE: 37
ADLS_ACUITY_SCORE: 37
ADLS_ACUITY_SCORE: 39
ADLS_ACUITY_SCORE: 37
ADLS_ACUITY_SCORE: 35
ADLS_ACUITY_SCORE: 37
ADLS_ACUITY_SCORE: 37
ADLS_ACUITY_SCORE: 39
ADLS_ACUITY_SCORE: 39
ADLS_ACUITY_SCORE: 37

## 2024-08-27 NOTE — PLAN OF CARE
Goal Outcome Evaluation:      Plan of Care Reviewed With: patient    Overall Patient Progress: improvingOverall Patient Progress: improving    Outcome Evaluation: Patient up from ER last night, alert and orientated to self. Patient forgetful and needing frequent re-orientation, cooperative. Patient has intermittent unsteady gait, improved this AM. Neuros remain otherwise intact. Tele SB. Plan for ECHO and neuro consult today.

## 2024-08-27 NOTE — ED TRIAGE NOTES
Brought in thru triage by son. Pt was at a ball game and at 1945 pt began to feel sudden weakness with slurred speech, feeling confused.      Triage Assessment (Adult)       Row Name 08/26/24 2045          Triage Assessment    Airway WDL WDL        Respiratory WDL    Respiratory WDL WDL        Skin Circulation/Temperature WDL    Skin Circulation/Temperature WDL WDL        Cardiac WDL    Cardiac WDL WDL        Peripheral/Neurovascular WDL    Peripheral Neurovascular WDL WDL        Cognitive/Neuro/Behavioral WDL    Cognitive/Neuro/Behavioral WDL X        Ena Coma Scale    Best Eye Response 4-->(E4) spontaneous     Best Motor Response 6-->(M6) obeys commands     Best Verbal Response 4-->(V4) confused     [place] Coma Scale Score 14

## 2024-08-27 NOTE — PROGRESS NOTES
RECEIVING UNIT ED HANDOFF REVIEW    ED Nurse Handoff Report was reviewed by: Chanell Aldridge RN on August 26, 2024 at 11:53 PM

## 2024-08-27 NOTE — PLAN OF CARE
Physical Therapy: Orders received. Chart reviewed and discussed with care team.? Physical Therapy not indicated per PT screen, pt ambulating IND without an assistive device, demonstrated ability to complete stairs IND w/o railing support to simulate home environment. Balance assessed and no overt LOB occurred throughout session. No PT needs identified at this time .? Defer discharge recommendations to medical team.? Will complete orders.

## 2024-08-27 NOTE — H&P
[facility]    History and Physical - Hospitalist Service       Date of Admission:  8/26/2024    Assessment & Plan   Richard Kitchen is a 74 year old male with past medical history significant for hyperlipidemia, left vertebral artery perfusion defect, hx of tobacco use who presents with slurred speech, confusion, falls. Admitted on 8/26/2024.     Suspected metabolic encephalopathy and slurred speech secondary to alcohol intoxication  Possible TIA   Hyperlipidemia   Chronic left vertebral artery occlusion   *Presents with slurred speech, confusion, falls.  *GEM 0.25. Reports two cocktails prior to attending the game. Does not typically drink daily.   *Head CT with no acute intracranial process.   *CTA head/neck with asymmetric diminished opacification of the left vertebral artery with nonopacified V3 segment, favored to reflect a chronic occlusion versus dissection, remaining major vessels of the head and neck are patent  *MRI brain with no acute findings, abnormal left vertebral artery flow void stable from 8/30/2023.  *MRA head with no flow within the left V4 segment compatible with proximal occlusion, otherwise unremarkable  *MRA neck with nondominant left vertebral artery occluded at the distal V2 segment, likely on a chronic basis as similar to 8/30/2023 as above, otherwise unremarkable  *Constellation of presenting symptoms would be compatible with acute alcohol intoxication; stroke neurology recommended admission to complete stroke/TIA work-up    - Neurology consulted   - Echocardiogram   - Telemetry  - HgbA1c 5.8%, lipid panel: Tchol 143, HDL 54, LDL 63  - PT/OT/SLP deferred as back to baseline  - NPO pending bedside RN swallow evaluation, conditional diet ordered  - Aspirin 81 mg + clopidogrel 75 mg (loaded in ED) per neurology.  - Atorvastatin 40 mg at bedtime per neurology  - Permissive hypertension, goal SBP <220; PRN anti-hypertensives ordered for severe elevations    Mild  cognitive impairment  Has seen neurology as outpatient.  - Continue outpatient follow-up     Chronic kidney disease, stage 2  Baseline creatinine 1.1-1.2. Creatinine 1.19 on admission.   - Currently around baseline  - Avoid nephrotoxins    Hx of malignant melanoma  - Noted        Diet: Combination Diet Regular Diet    DVT Prophylaxis: Pneumatic Compression Devices  [place] Catheter: Not present  Code Status: Full Code      Disposition Plan   [place] to observation status. Anticipate discharge within 24 hours if clinically improved.     Entered: Khoa Aguilar MD 08/27/2024, 2:09 AM     The patient's care was discussed with the ED provider, patient     Clinically Significant Risk Factors Present on Admission                  # Hypertension: Home medication list includes antihypertensive(s)                               Khoa Aguilar MD  [facility]    ______________________________________________________________________    Chief Complaint   Slurred speech, confusion    History of Present Illness   Richard Kitchen is a 74 year old male who presents with the above chief complaint.    History is obtained from the patient, discussion with ED provider and review of medical record.  The patient reports he had two whiskey cocktails the evening of presentation prior to being driven to the [employer] game by a neighbor/friend. He remembers entering the game, but has a poor recollection of events after that. His friend reportedly became concerned when he began slurring his speech and appeared confused, and also reportedly fell to the ground a few times. He reports he had not talked to his friend about his drinking beforehand. At present he feels back to his normal self. He denies any ongoing focal numbness/tingling/weakness, speech difficulties, vision changes. He reports he typically drinks non-alcoholic beer or wine, will occasionally buy a small bottle of whiskey and make cocktails a few times a  month.      In the Emergency Department, the patient was seen by Dr. Moses, with whom I discussed the patient's presenting symptoms and emergency department course.  Initial vital signs were a temperature of 97.1F, HR 59, /64, RR 17, SpO2 98% on room air. Pertinent work-up as noted in A&P. The patient received aspirin, clopidogrel, magnesium, folate, thiamine, MVI and Hospitalists were contacted for admission to the hospital.     Past Medical History    I have reviewed this patient's medical history and updated it with pertinent information if needed.   Past Medical History:   Diagnosis Date    Hernia, abdominal     Malignant melanoma (H) 03/15/2022    Mumps     Skin cancer        Past Surgical History   I have reviewed this patient's surgical history and updated it with pertinent information if needed.  Past Surgical History:   Procedure Laterality Date    BIOPSY  2012, 2015    Skin cancer    HERNIA REPAIR  1984    VASECTOMY           Social History   I have reviewed this patient's social history and updated it with pertinent information if needed.  Social History     Tobacco Use    Smoking status: Former     Current packs/day: 0.50     Average packs/day: 0.5 packs/day for 1 year (0.5 ttl pk-yrs)     Types: Cigarettes    Smokeless tobacco: Never    Tobacco comments:     1 cigareete a year with freinds but never since age of 18 yrs when he smoked for 1 year.   Vaping Use    Vaping status: Never Used   Substance Use Topics    Alcohol use: Yes     Comment: 6 glasses of wine per week    Drug use: Never         Family History   I have reviewed this patient's family history and updated it with pertinent information if needed.   Family History   Problem Relation Age of Onset    Prostate Cancer Father     Colon Cancer Paternal Grandmother     Colon Cancer Other        Prior to Admission Medications     Prior to Admission Medications   Prescriptions Last Dose Informant Patient Reported? Taking?   ACE/ARB/ARNI NOT  PRESCRIBED (INTENTIONAL)   Yes No   Sig: Please choose reason not prescribed from choices below.   atorvastatin (LIPITOR) 40 MG tablet Not Taking  No No   Sig: Take 1 tablet (40 mg) by mouth daily   Patient not taking: Reported on 8/26/2024      Facility-Administered Medications: None     Allergies   Allergies   Allergen Reactions    Similasan Hay Fever Relief [Cold-Eeze]     Seasonal Allergies Other (See Comments)     Rhinitis        Physical Exam   Vital Signs: Temp: 97.5  F (36.4  C) Temp src: Oral BP: 125/76 Pulse: 59   Resp: 18 SpO2: 97 % O2 Device: None (Room air)    Weight: 0 lbs 0 oz    Constitutional: Well-appearing, NAD  Eyes: PERRL, EOMI  Respiratory: Clear to auscultation bilaterally, good air movement, normal effort   Cardiovascular: RRR, no m/r/g. No peripheral edema.   GI: Soft, non-tender, non-distended. No rebound tenderness or guarding.    Skin: Warm, dry   Neurologic: Alert. Responding to questions appropriately. Following commands. Oriented to person, place and time. Face symmetric. Tongue midline. 5/5 upper and lower extremity strength symmetric bilaterally. Intact finger-nose-finger testing symmetric bilaterally. Intact heal-shin testing symmetric bilaterally.    Psychiatric: Normal affect, appropriate      Data   Data reviewed today: I reviewed all medications, new labs and imaging results over the last 24 hours. I personally reviewed the EKG tracing showing sinus bradycardia  .    Recent Labs   Lab 08/26/24 2046   WBC 5.1   HGB 14.8   MCV 94      INR 1.04      POTASSIUM 4.2   CHLORIDE 105   CO2 27   BUN 15.9   CR 1.19*   ANIONGAP 11   TIFFANIE 9.9   *   ALBUMIN 4.6   PROTTOTAL 7.6   BILITOTAL 0.4   ALKPHOS 56   ALT 25   AST 24       Recent Results (from the past 24 hour(s))   CT Head w/o Contrast    Narrative    EXAM: CT HEAD W/O CONTRAST  LOCATION: [facility]  DATE/TIME: 8/26/2024 8:52 PM CDT    INDICATION: Code stroke; slurred speech  COMPARISON:   MRI brain 8/30/2023.  TECHNIQUE: Routine CT Head without IV contrast. Multiplanar reformats. Dose reduction techniques were used.    FINDINGS:   INTRACRANIAL CONTENTS: No intracranial hemorrhage, extraaxial collection, or mass effect.  No CT evidence of acute infarct. Normal parenchymal attenuation for age. Normal ventricles and sulci for age.     VISUALIZED ORBITS/SINUSES/MASTOIDS: No intraorbital abnormality. No significant paranasal sinus mucosal disease. No middle ear or mastoid effusion.    BONES/SOFT TISSUES: No acute abnormality.      Impression    IMPRESSION:  1.  No acute intracranial process.    Dr. Moses was contacted by me on 8/26/2024 8:59 PM CDT with the preliminary report.     CTA Head Neck with Contrast    Narrative    EXAM: CTA HEAD NECK W CONTRAST  LOCATION: [facility]  DATE/TIME: 8/26/2024 8:58 PM CDT    INDICATION: Code stroke; slurred speech  COMPARISON: Same day CT head without contrast, MRI brain 8/30/2023  CONTRAST: 67 mL Isovue 370  TECHNIQUE: Head and neck CT angiogram with IV contrast. Axial helical CT images of the head and neck vessels obtained during the arterial phase of intravenous contrast administration. Axial 2D reconstructed images and multiplanar 3D MIP reconstructed   images of the head and neck vessels were performed by the technologist. Dose reduction techniques were used. All stenosis measurements made according to NASCET criteria unless otherwise specified.    FINDINGS:   HEAD CTA:  ANTERIOR CIRCULATION: No stenosis/occlusion, aneurysm, or high flow vascular malformation. Fetal origin of the right posterior cerebral artery from the anterior circulation.    POSTERIOR CIRCULATION:  Retrograde opacification of the left V4 with nonopacification of the V3/V4 junction. The remaining posterior circulation is patent. No aneurysm or high flow vascular malformation.    DURAL VENOUS SINUSES: Expected enhancement of the major dural venous sinuses.    NECK  CTA:  RIGHT CAROTID: No measurable stenosis or dissection.    LEFT CAROTID: No measurable stenosis or dissection.    VERTEBRAL ARTERIES:  Asymmetric diminished opacification of the left vertebral artery with nonopacification of the V3 segment.  Dominant right and smaller left vertebral arteries.    AORTIC ARCH: Classic aortic arch anatomy with no significant stenosis at the origin of the great vessels.    NONVASCULAR STRUCTURES: Unremarkable.      Impression    IMPRESSION:   1.  Asymmetric diminished opacification of the left vertebral artery with nonopacified V3 segment. This is favored to reflect a chronic occlusion versus dissection as alteration of flow was also seen on the 8/30/2023 MRI.  2.  The remaining major vessels of the head and neck are patent.    Dr. Moses was contacted by me on 8/26/2024 8:59 PM CDT with the preliminary report.   MR Brain w/o & w Contrast    Narrative    EXAM: MR BRAIN W/O and W CONTRAST, MRA BRAIN ([place]) W/O CONTRAST, MRA NECK (CAROTIDS) W/O and W CONTRAST  LOCATION: [facility]  DATE: 8/27/2024    INDICATION: Stroke, left vert occlusion  COMPARISON: CTA head and neck 8/26/2024, brain MRI 8/30/2023  CONTRAST: 10 mL IV Gadavist  TECHNIQUE:   1) Routine multiplanar multisequence head MRI without and with intravenous contrast.  2) 3D time-of-flight head MRA without intravenous contrast.  3) Neck MRA without and with IV contrast. Stenosis measurements made according to NASCET criteria unless otherwise specified.    FINDINGS:  HEAD MRI:  INTRACRANIAL CONTENTS: No acute or subacute infarct. No mass, acute hemorrhage, or extra-axial fluid collections. Scattered nonspecific T2/FLAIR hyperintensities within the cerebral white matter most consistent with mild chronic microvascular ischemic   change. There is a focus of susceptibility signal in the right parietal cortex. Mild generalized cerebral atrophy. No hydrocephalus. Normal position of the cerebellar  tonsils. No pathologic contrast enhancement.    SELLA: No abnormality accounting for technique.    OSSEOUS STRUCTURES/SOFT TISSUES: Normal marrow signal. Loss of the normal left vertebral artery flow void.     ORBITS: No abnormality accounting for technique.     SINUSES/MASTOIDS: No paranasal sinus mucosal disease. No middle ear or mastoid effusion.     HEAD MRA:   ANTERIOR CIRCULATION: No stenosis/occlusion, aneurysm, or high flow vascular malformation. Standard [place] of [place] anatomy.    POSTERIOR CIRCULATION: No stenosis/occlusion, aneurysm, or high flow vascular malformation. No flow within the left V4 segment compatible with proximal occlusion.     NECK MRA:   RIGHT CAROTID: No measurable stenosis or dissection.    LEFT CAROTID: No measurable stenosis or dissection.    VERTEBRAL ARTERIES: No focal stenosis or dissection. The nondominant left vertebral artery is occluded at the distal V4 segment. There is some high signal on the T1 fat suppressed images in this locale suggesting possible occlusive dissection.    AORTIC ARCH: Classic aortic arch anatomy with no significant stenosis at the origin of the great vessels.      Impression    IMPRESSION:  HEAD MRI:  1.  No acute findings.  2.  Age-related changes.  3.  Abnormal left vertebral artery flow void is stable from 8/30/2023    HEAD MRA:  1.  No flow within the left V4 segment compatible with proximal occlusion.  2.  Otherwise unremarkable.    NECK MRA:  1.  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  2.  Otherwise unremarkable.     MRA Neck (Carotids) w/o & w Contrast    Narrative    EXAM: MR BRAIN W/O and W CONTRAST, MRA BRAIN ([place]) W/O CONTRAST, MRA NECK (CAROTIDS) W/O and W CONTRAST  LOCATION: [facility]  DATE: 8/27/2024    INDICATION: Stroke, left vert occlusion  COMPARISON:  CTA head and neck 8/26/2024, brain MRI 8/30/2023  CONTRAST: 10 mL IV Gadavist  TECHNIQUE:   1) Routine multiplanar multisequence head MRI without and with intravenous contrast.  2) 3D time-of-flight head MRA without intravenous contrast.  3) Neck MRA without and with IV contrast. Stenosis measurements made according to NASCET criteria unless otherwise specified.    FINDINGS:  HEAD MRI:  INTRACRANIAL CONTENTS: No acute or subacute infarct. No mass, acute hemorrhage, or extra-axial fluid collections. Scattered nonspecific T2/FLAIR hyperintensities within the cerebral white matter most consistent with mild chronic microvascular ischemic   change. There is a focus of susceptibility signal in the right parietal cortex. Mild generalized cerebral atrophy. No hydrocephalus. Normal position of the cerebellar tonsils. No pathologic contrast enhancement.    SELLA: No abnormality accounting for technique.    OSSEOUS STRUCTURES/SOFT TISSUES: Normal marrow signal. Loss of the normal left vertebral artery flow void.     ORBITS: No abnormality accounting for technique.     SINUSES/MASTOIDS: No paranasal sinus mucosal disease. No middle ear or mastoid effusion.     HEAD MRA:   ANTERIOR CIRCULATION: No stenosis/occlusion, aneurysm, or high flow vascular malformation. Standard [place] anatomy.    POSTERIOR CIRCULATION: No stenosis/occlusion, aneurysm, or high flow vascular malformation. No flow within the left V4 segment compatible with proximal occlusion.     NECK MRA:   RIGHT CAROTID: No measurable stenosis or dissection.    LEFT CAROTID: No measurable stenosis or dissection.    VERTEBRAL ARTERIES: No focal stenosis or dissection. The nondominant left vertebral artery is occluded at the distal V4 segment. There is some high signal on the T1 fat suppressed images in this locale suggesting possible occlusive dissection.    AORTIC ARCH: Classic aortic arch anatomy with no significant stenosis at the origin of the great  vessels.      Impression    IMPRESSION:  HEAD MRI:  1.  No acute findings.  2.  Age-related changes.  3.  Abnormal left vertebral artery flow void is stable from 8/30/2023    HEAD MRA:  1.  No flow within the left V4 segment compatible with proximal occlusion.  2.  Otherwise unremarkable.    NECK MRA:  1.  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  2.  Otherwise unremarkable.     MRA Brain ([place]) w/o Contrast    Narrative    EXAM: MR BRAIN W/O and W CONTRAST, MRA BRAIN ([place]) W/O CONTRAST, MRA NECK (CAROTIDS) W/O and W CONTRAST  LOCATION: [facility]  DATE: 8/27/2024    INDICATION: Stroke, left vert occlusion  COMPARISON: CTA head and neck 8/26/2024, brain MRI 8/30/2023  CONTRAST: 10 mL IV Gadavist  TECHNIQUE:   1) Routine multiplanar multisequence head MRI without and with intravenous contrast.  2) 3D time-of-flight head MRA without intravenous contrast.  3) Neck MRA without and with IV contrast. Stenosis measurements made according to NASCET criteria unless otherwise specified.    FINDINGS:  HEAD MRI:  INTRACRANIAL CONTENTS: No acute or subacute infarct. No mass, acute hemorrhage, or extra-axial fluid collections. Scattered nonspecific T2/FLAIR hyperintensities within the cerebral white matter most consistent with mild chronic microvascular ischemic   change. There is a focus of susceptibility signal in the right parietal cortex. Mild generalized cerebral atrophy. No hydrocephalus. Normal position of the cerebellar tonsils. No pathologic contrast enhancement.    SELLA: No abnormality accounting for technique.    OSSEOUS STRUCTURES/SOFT TISSUES: Normal marrow signal. Loss of the normal left vertebral artery flow void.     ORBITS: No abnormality accounting for technique.     SINUSES/MASTOIDS: No paranasal sinus mucosal disease.  No middle ear or mastoid effusion.     HEAD MRA:   ANTERIOR CIRCULATION: No stenosis/occlusion, aneurysm, or high flow vascular malformation. Standard [place] anatomy.    POSTERIOR CIRCULATION: No stenosis/occlusion, aneurysm, or high flow vascular malformation. No flow within the left V4 segment compatible with proximal occlusion.     NECK MRA:   RIGHT CAROTID: No measurable stenosis or dissection.    LEFT CAROTID: No measurable stenosis or dissection.    VERTEBRAL ARTERIES: No focal stenosis or dissection. The nondominant left vertebral artery is occluded at the distal V4 segment. There is some high signal on the T1 fat suppressed images in this locale suggesting possible occlusive dissection.    AORTIC ARCH: Classic aortic arch anatomy with no significant stenosis at the origin of the great vessels.      Impression    IMPRESSION:  HEAD MRI:  1.  No acute findings.  2.  Age-related changes.  3.  Abnormal left vertebral artery flow void is stable from 8/30/2023    HEAD MRA:  1.  No flow within the left V4 segment compatible with proximal occlusion.  2.  Otherwise unremarkable.    NECK MRA:  1.  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  2.  Otherwise unremarkable.

## 2024-08-27 NOTE — PHARMACY-ADMISSION MEDICATION HISTORY
Pharmacist Admission Medication History    Admission medication history is complete. The information provided in this note is only as accurate as the sources available at the time of the update.    Information Source(s): Patient, Family member, and CareEverywhere/SureScripts via in-person    Pertinent Information: patient denies taking any medications on a regular basis, including atorvastatin which I appreciate was last filled 6/23/24 for 90 days supply    Changes made to PTA medication list:  Added: None  Deleted: None  Changed: None    Allergies reviewed with patient and updates made in EHR: yes    Medication History Completed By: Maura Bettencourt RPH 8/26/2024 10:05 PM    No outpatient medications have been marked as taking for the 8/26/24 encounter (Hospital Encounter).

## 2024-08-27 NOTE — ED NOTES
[facility]  ED Nurse Handoff Report    ED Chief complaint: Stroke Symptoms      ED Diagnosis:   Final diagnoses:   Confusion   Alcoholic intoxication without complication (H24)   History of memory loss       Code Status: not addressed at this time    Allergies:   Allergies   Allergen Reactions    Similasan Hay Fever Relief [Cold-Eeze]     Seasonal Allergies Other (See Comments)     Rhinitis        Patient Story: Brought in thru triage by son. Pt was at a ball game and at 1945 pt began to feel sudden weakness with slurred speech, feeling confused.       Focused Assessment:  A&Ox3-4, forgetful especially to situation. Speech sounds normal yet son says slightly slurrred. All other neuro intact. Denies pain   Labs Ordered and Resulted from Time of ED Arrival to Time of ED Departure   BASIC METABOLIC PANEL - Abnormal       Result Value    Sodium 143      Potassium 4.2      Chloride 105      Carbon Dioxide (CO2) 27      Anion Gap 11      Urea Nitrogen 15.9      Creatinine 1.19 (*)     GFR Estimate 63      Calcium 9.9      Glucose 105 (*)    ETHYL ALCOHOL LEVEL - Abnormal    Alcohol ethyl 0.25 (*)    MAGNESIUM - Abnormal    Magnesium 2.6 (*)    HEMOGLOBIN A1C - Abnormal    Hemoglobin A1C 5.8 (*)    INR - Normal    INR 1.04     PARTIAL THROMBOPLASTIN TIME - Normal    aPTT 37     TROPONIN T, HIGH SENSITIVITY - Normal    Troponin T, High Sensitivity 11     HEPATIC FUNCTION PANEL - Normal    Protein Total 7.6      Albumin 4.6      Bilirubin Total 0.4      Alkaline Phosphatase 56      AST 24      ALT 25      Bilirubin Direct <0.20     UA MACROSCOPIC WITH REFLEX TO MICRO AND CULTURE - Normal    Color Urine Straw      Appearance Urine Clear      Glucose Urine Negative      Bilirubin Urine Negative      Ketones Urine Negative      Specific Gravity Urine 1.015      Blood Urine Negative      pH Urine 6.0      Protein Albumin Urine Negative      Urobilinogen Urine Normal      Nitrite Urine Negative      Leukocyte  Esterase Urine Negative     CBC WITH PLATELETS AND DIFFERENTIAL    WBC Count 5.1      RBC Count 4.77      Hemoglobin 14.8      Hematocrit 44.6      MCV 94      MCH 31.0      MCHC 33.2      RDW 13.2      Platelet Count 157      % Neutrophils 54      % Lymphocytes 31      % Monocytes 10      % Eosinophils 3      % Basophils 1      % Immature Granulocytes 0      NRBCs per 100 WBC 0      Absolute Neutrophils 2.8      Absolute Lymphocytes 1.6      Absolute Monocytes 0.5      Absolute Eosinophils 0.2      Absolute Basophils 0.1      Absolute Immature Granulocytes 0.0      Absolute NRBCs 0.0     GLUCOSE MONITOR NURSING POCT   LIPID REFLEX TO DIRECT LDL PANEL      CTA Head Neck with Contrast   Final Result   IMPRESSION:    1.  Asymmetric diminished opacification of the left vertebral artery with nonopacified V3 segment. This is favored to reflect a chronic occlusion versus dissection as alteration of flow was also seen on the 8/30/2023 MRI.   2.  The remaining major vessels of the head and neck are patent.      Dr. Moses was contacted by me on 8/26/2024 8:59 PM CDT with the preliminary report.      CT Head w/o Contrast   Final Result   IMPRESSION:   1.  No acute intracranial process.      Dr. Moses was contacted by me on 8/26/2024 8:59 PM CDT with the preliminary report.         MR Brain w/o & w Contrast    (Results Pending)   MRA Neck (Carotids) w/o & w Contrast    (Results Pending)   MRA Brain ([place]) w/o Contrast    (Results Pending)        Treatments and/or interventions provided: see MAR  Patient's response to treatments and/or interventions: cont to assess    To be done/followed up on inpatient unit:  cont with admitting mD orders    Does this patient have any cognitive concerns?: Forgetful and Disoriented to situation    Activity level - Baseline/Home:  Independent  Activity Level - Current:   Stand with Assist    Patient's Preferred language: English   Needed?: No    Isolation:  None  Infection: Not Applicable  Patient tested for COVID 19 prior to admission: NO  Bariatric?: No    Vital Signs:   Vitals:    08/26/24 2115 08/26/24 2130 08/26/24 2150 08/26/24 2200   BP: 111/82 123/83 98/77 124/74   Pulse: 56 55 53 53   Resp: 16 11     Temp:       TempSrc:       SpO2: 96%  97% 97%       Cardiac Rhythm:Cardiac Rhythm: Sinus bradycardia    Was the PSS-3 completed:   Yes  What interventions are required if any?               Family Comments: at bedside  OBS brochure/video discussed/provided to patient/family: Yes                  For the majority of the shift this patient's behavior was Green.   Behavioral interventions performed were none needed.    ED NURSE PHONE NUMBER: [phone]

## 2024-08-27 NOTE — DISCHARGE INSTRUCTIONS
Avoid any alcohol consumption. If you or your family notice any signs/symptoms of alcohol withdrawal as noted in the handout on Alcohol Intoxication please seek emergency medical care. It is best if you do not drive for the next few days until you are less confused and feeling closer to your usual baseline. Family agrees to have contact with you daily to make sure you are doing well after leaving the hospital.

## 2024-08-27 NOTE — PROGRESS NOTES
[facility]    Medicine Progress Note - Hospitalist Service    Date of Admission:  8/26/2024    Assessment & Plan   Richard Kitchen is a 74 year old male with past medical history significant for hyperlipidemia, left vertebral artery perfusion defect, hx of tobacco use who presents with slurred speech, confusion, falls. Admitted on 8/26/2024.     Suspected metabolic encephalopathy and slurred speech secondary to alcohol intoxication  Possible TIA   Hyperlipidemia   Chronic left vertebral artery occlusion   *Presents with slurred speech, confusion, falls.  *GEM 0.25. Reports two cocktails prior to attending the game. Does not typically drink daily.   *Head CT with no acute intracranial process.   *CTA head/neck with asymmetric diminished opacification of the left vertebral artery with nonopacified V3 segment, favored to reflect a chronic occlusion versus dissection, remaining major vessels of the head and neck are patent  *MRI brain with no acute findings, abnormal left vertebral artery flow void stable from 8/30/2023.  *MRA head with no flow within the left V4 segment compatible with proximal occlusion, otherwise unremarkable  *MRA neck with nondominant left vertebral artery occluded at the distal V2 segment, likely on a chronic basis as similar to 8/30/2023 as above, otherwise unremarkable  *Constellation of presenting symptoms would be compatible with acute alcohol intoxication; stroke neurology recommended admission to complete stroke/TIA work-up    - Neurology consulted   - Echocardiogram   - Telemetry  - HgbA1c 5.8%, lipid panel: Tchol 143, HDL 54, LDL 63  - PT/OT/SLP deferred as back to baseline  - NPO pending bedside RN swallow evaluation, conditional diet ordered  - Aspirin 81 mg + clopidogrel 75 mg (loaded in ED) per neurology.  - Atorvastatin 40 mg at bedtime per neurology  - Permissive hypertension, goal SBP <220; PRN anti-hypertensives ordered for severe elevations    Mild  cognitive impairment  Has seen neurology as outpatient.  - Continue outpatient follow-up     Chronic kidney disease, stage 2  Baseline creatinine 1.1-1.2. Creatinine 1.19 on admission.   - Currently around baseline  - Avoid nephrotoxins    Hx of malignant melanoma  - Noted        Observation Goals: List all goals to be met before discharge home    , Free from ACUTE neuro deficits, Testing complete, Other: , Nurse to notify provider when observation goals have been met and patient is ready for discharge.  Diet: Combination Diet Regular Diet    DVT Prophylaxis: Ambulate every shift  [place] Catheter: Not present  Lines: None     Cardiac Monitoring: ACTIVE order. Indication: Stroke, acute (48 hours)  Code Status: Full Code      Clinically Significant Risk Factors Present on Admission   { TIP  This section helps capture the illness of the patient on admission.     - Review diagnoses highlighted in blue; right click, edit & delete if not appropriate   - If blank, no additional diagnoses identified   :21892}               # Hypertension: Home medication list includes antihypertensive(s)                          Disposition Plan   {TIP  The patient's Medical Readiness for Discharge [MRD] has been documented today or is 'Ready Now'. Last Documentation- Anticipated Tomorrow 08/27/2024 Use SmartList below if a change is needed.  :55653}  Medically Ready for Discharge: {TIME; MEDICALLY READY FOR DISCHARGE:85871285}         The patient's care was discussed with Dr. Johnathon Aparicio PA-C  Hospitalist Service  [facility]  Securely message with [employer] (more info)  Text page via [facility] Paging/Directory   ______________________________________________________________________    Interval History   {Pertinent overnight events  ;***}    Physical Exam   Temp: 98.1  F (36.7  C) Temp src: Oral BP: 134/83 Pulse: 61   Resp: 18 SpO2: 95 % O2 Device: None (Room air)    There were no vitals filed for  "this visit.  Vital Signs with Ranges  Temp:  [97.1  F (36.2  C)-98.1  F (36.7  C)] 98.1  F (36.7  C)  Pulse:  [53-61] 61  Resp:  [11-18] 18  BP: ()/(64-90) 134/83  SpO2:  [94 %-98 %] 95 %  No intake/output data recorded.    Constitutional: Alert and oriented, no acute distress  ENT: normal cephalic, moist mucous membranes  Respiratory: Lungs clear to auscultation bilaterally, no increased work of breathing  Cardiovascular: Regular rate and rhythm, no murmur, no edema, distal pulses +2/4  GI: Normal active bowel sounds, abdomen soft, non-tender  Skin/Integumen: warm, dry  Other:        Medical Decision Making   { TIP   MDM Calculator    MDM grid (w/ times)    Coding Support Chat  Billing is now based on time OR medical decision making complexity. Medical decision making included in your A&P does NOT need to be re-documented here.    :40340}    {Time  :165378::\"*** MINUTES SPENT BY ME on the date of service doing chart review, history, exam, documentation & further activities per the note.\"}      Data     I have personally reviewed the following data over the past 24 hrs:    5.1  \   14.8   / 157     143 105 15.9 /  105 (H)   4.2 27 1.19 (H) \     ALT: 25 AST: 24 AP: 56 TBILI: 0.4   ALB: 4.6 TOT PROTEIN: 7.6 LIPASE: N/A     Trop: 11 BNP: N/A     TSH: N/A T4: N/A A1C: 5.8 (H)     INR:  1.04 PTT:  37   D-dimer:  N/A Fibrinogen:  N/A       Imaging results reviewed over the past 24 hrs:   Recent Results (from the past 24 hour(s))   CT Head w/o Contrast    Narrative    EXAM: CT HEAD W/O CONTRAST  LOCATION: [facility]  DATE/TIME: 8/26/2024 8:52 PM CDT    INDICATION: Code stroke; slurred speech  COMPARISON:  MRI brain 8/30/2023.  TECHNIQUE: Routine CT Head without IV contrast. Multiplanar reformats. Dose reduction techniques were used.    FINDINGS:   INTRACRANIAL CONTENTS: No intracranial hemorrhage, extraaxial collection, or mass effect.  No CT evidence of acute infarct. Normal parenchymal " attenuation for age. Normal ventricles and sulci for age.     VISUALIZED ORBITS/SINUSES/MASTOIDS: No intraorbital abnormality. No significant paranasal sinus mucosal disease. No middle ear or mastoid effusion.    BONES/SOFT TISSUES: No acute abnormality.      Impression    IMPRESSION:  1.  No acute intracranial process.    Dr. Moses was contacted by me on 8/26/2024 8:59 PM CDT with the preliminary report.     CTA Head Neck with Contrast    Narrative    EXAM: CTA HEAD NECK W CONTRAST  LOCATION: [facility]  DATE/TIME: 8/26/2024 8:58 PM CDT    INDICATION: Code stroke; slurred speech  COMPARISON: Same day CT head without contrast, MRI brain 8/30/2023  CONTRAST: 67 mL Isovue 370  TECHNIQUE: Head and neck CT angiogram with IV contrast. Axial helical CT images of the head and neck vessels obtained during the arterial phase of intravenous contrast administration. Axial 2D reconstructed images and multiplanar 3D MIP reconstructed   images of the head and neck vessels were performed by the technologist. Dose reduction techniques were used. All stenosis measurements made according to NASCET criteria unless otherwise specified.    FINDINGS:   HEAD CTA:  ANTERIOR CIRCULATION: No stenosis/occlusion, aneurysm, or high flow vascular malformation. Fetal origin of the right posterior cerebral artery from the anterior circulation.    POSTERIOR CIRCULATION:  Retrograde opacification of the left V4 with nonopacification of the V3/V4 junction. The remaining posterior circulation is patent. No aneurysm or high flow vascular malformation.    DURAL VENOUS SINUSES: Expected enhancement of the major dural venous sinuses.    NECK CTA:  RIGHT CAROTID: No measurable stenosis or dissection.    LEFT CAROTID: No measurable stenosis or dissection.    VERTEBRAL ARTERIES:  Asymmetric diminished opacification of the left vertebral artery with nonopacification of the V3 segment.  Dominant right and smaller left vertebral  arteries.    AORTIC ARCH: Classic aortic arch anatomy with no significant stenosis at the origin of the great vessels.    NONVASCULAR STRUCTURES: Unremarkable.      Impression    IMPRESSION:   1.  Asymmetric diminished opacification of the left vertebral artery with nonopacified V3 segment. This is favored to reflect a chronic occlusion versus dissection as alteration of flow was also seen on the 8/30/2023 MRI.  2.  The remaining major vessels of the head and neck are patent.    Dr. Moses was contacted by me on 8/26/2024 8:59 PM CDT with the preliminary report.   MR Brain w/o & w Contrast    Narrative    EXAM: MR BRAIN W/O and W CONTRAST, MRA BRAIN ([place]) W/O CONTRAST, MRA NECK (CAROTIDS) W/O and W CONTRAST  LOCATION: [facility]  DATE: 8/27/2024    INDICATION: Stroke, left vert occlusion  COMPARISON: CTA head and neck 8/26/2024, brain MRI 8/30/2023  CONTRAST: 10 mL IV Gadavist  TECHNIQUE:   1) Routine multiplanar multisequence head MRI without and with intravenous contrast.  2) 3D time-of-flight head MRA without intravenous contrast.  3) Neck MRA without and with IV contrast. Stenosis measurements made according to NASCET criteria unless otherwise specified.    FINDINGS:  HEAD MRI:  INTRACRANIAL CONTENTS: No acute or subacute infarct. No mass, acute hemorrhage, or extra-axial fluid collections. Scattered nonspecific T2/FLAIR hyperintensities within the cerebral white matter most consistent with mild chronic microvascular ischemic   change. There is a focus of susceptibility signal in the right parietal cortex. Mild generalized cerebral atrophy. No hydrocephalus. Normal position of the cerebellar tonsils. No pathologic contrast enhancement.    SELLA: No abnormality accounting for technique.    OSSEOUS STRUCTURES/SOFT TISSUES: Normal marrow signal. Loss of the normal left vertebral artery flow void.     ORBITS: No abnormality accounting for technique.     SINUSES/MASTOIDS: No  paranasal sinus mucosal disease. No middle ear or mastoid effusion.     HEAD MRA:   ANTERIOR CIRCULATION: No stenosis/occlusion, aneurysm, or high flow vascular malformation. [place] anatomy.    POSTERIOR CIRCULATION: No stenosis/occlusion, aneurysm, or high flow vascular malformation. No flow within the left V4 segment compatible with proximal occlusion.     NECK MRA:   RIGHT CAROTID: No measurable stenosis or dissection.    LEFT CAROTID: No measurable stenosis or dissection.    VERTEBRAL ARTERIES: No focal stenosis or dissection. The nondominant left vertebral artery is occluded at the distal V4 segment. There is some high signal on the T1 fat suppressed images in this locale suggesting possible occlusive dissection.    AORTIC ARCH: Classic aortic arch anatomy with no significant stenosis at the origin of the great vessels.      Impression    IMPRESSION:  HEAD MRI:  1.  No acute findings.  2.  Age-related changes.  3.  Abnormal left vertebral artery flow void is stable from 8/30/2023    HEAD MRA:  1.  No flow within the left V4 segment compatible with proximal occlusion.  2.  Otherwise unremarkable.    NECK MRA:  1.  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  2.  Otherwise unremarkable.     MRA Neck (Carotids) w/o & w Contrast    Narrative    EXAM: MR BRAIN W/O and W CONTRAST, MRA BRAIN ([place]) W/O CONTRAST, MRA NECK (CAROTIDS) W/O and W CONTRAST  LOCATION: [facility]  DATE: 8/27/2024    INDICATION: Stroke, left vert occlusion  COMPARISON: CTA head and neck 8/26/2024, brain MRI 8/30/2023  CONTRAST: 10 mL IV Gadavist  TECHNIQUE:   1) Routine multiplanar multisequence head MRI without and with intravenous contrast.  2) 3D time-of-flight head MRA without intravenous contrast.  3) Neck MRA without and with IV contrast.  Stenosis measurements made according to NASCET criteria unless otherwise specified.    FINDINGS:  HEAD MRI:  INTRACRANIAL CONTENTS: No acute or subacute infarct. No mass, acute hemorrhage, or extra-axial fluid collections. Scattered nonspecific T2/FLAIR hyperintensities within the cerebral white matter most consistent with mild chronic microvascular ischemic   change. There is a focus of susceptibility signal in the right parietal cortex. Mild generalized cerebral atrophy. No hydrocephalus. Normal position of the cerebellar tonsils. No pathologic contrast enhancement.    SELLA: No abnormality accounting for technique.    OSSEOUS STRUCTURES/SOFT TISSUES: Normal marrow signal. Loss of the normal left vertebral artery flow void.     ORBITS: No abnormality accounting for technique.     SINUSES/MASTOIDS: No paranasal sinus mucosal disease. No middle ear or mastoid effusion.     HEAD MRA:   ANTERIOR CIRCULATION: No stenosis/occlusion, aneurysm, or high flow vascular malformation. Standard [place] anatomy.    POSTERIOR CIRCULATION: No stenosis/occlusion, aneurysm, or high flow vascular malformation. No flow within the left V4 segment compatible with proximal occlusion.     NECK MRA:   RIGHT CAROTID: No measurable stenosis or dissection.    LEFT CAROTID: No measurable stenosis or dissection.    VERTEBRAL ARTERIES: No focal stenosis or dissection. The nondominant left vertebral artery is occluded at the distal V4 segment. There is some high signal on the T1 fat suppressed images in this locale suggesting possible occlusive dissection.    AORTIC ARCH: Classic aortic arch anatomy with no significant stenosis at the origin of the great vessels.      Impression    IMPRESSION:  HEAD MRI:  1.  No acute findings.  2.  Age-related changes.  3.  Abnormal left vertebral artery flow void is stable from 8/30/2023    HEAD MRA:  1.  No flow within the left V4 segment compatible with proximal occlusion.  2.  Otherwise  unremarkable.    NECK MRA:  1.  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  2.  Otherwise unremarkable.     MRA Brain ([place]) w/o Contrast    Narrative    EXAM: MR BRAIN W/O and W CONTRAST, MRA BRAIN ([place]) W/O CONTRAST, MRA NECK (CAROTIDS) W/O and W CONTRAST  LOCATION: [facility]  DATE: 8/27/2024    INDICATION: Stroke, left vert occlusion  COMPARISON: CTA head and neck 8/26/2024, brain MRI 8/30/2023  CONTRAST: 10 mL IV Gadavist  TECHNIQUE:   1) Routine multiplanar multisequence head MRI without and with intravenous contrast.  2) 3D time-of-flight head MRA without intravenous contrast.  3) Neck MRA without and with IV contrast. Stenosis measurements made according to NASCET criteria unless otherwise specified.    FINDINGS:  HEAD MRI:  INTRACRANIAL CONTENTS: No acute or subacute infarct. No mass, acute hemorrhage, or extra-axial fluid collections. Scattered nonspecific T2/FLAIR hyperintensities within the cerebral white matter most consistent with mild chronic microvascular ischemic   change. There is a focus of susceptibility signal in the right parietal cortex. Mild generalized cerebral atrophy. No hydrocephalus. Normal position of the cerebellar tonsils. No pathologic contrast enhancement.    SELLA: No abnormality accounting for technique.    OSSEOUS STRUCTURES/SOFT TISSUES: Normal marrow signal. Loss of the normal left vertebral artery flow void.     ORBITS: No abnormality accounting for technique.     SINUSES/MASTOIDS: No paranasal sinus mucosal disease. No middle ear or mastoid effusion.     HEAD MRA:   ANTERIOR CIRCULATION: No stenosis/occlusion, aneurysm, or high flow vascular malformation. Standard [place] anatomy.    POSTERIOR CIRCULATION: No stenosis/occlusion, aneurysm, or high flow vascular malformation. No  flow within the left V4 segment compatible with proximal occlusion.     NECK MRA:   RIGHT CAROTID: No measurable stenosis or dissection.    LEFT CAROTID: No measurable stenosis or dissection.    VERTEBRAL ARTERIES: No focal stenosis or dissection. The nondominant left vertebral artery is occluded at the distal V4 segment. There is some high signal on the T1 fat suppressed images in this locale suggesting possible occlusive dissection.    AORTIC ARCH: Classic aortic arch anatomy with no significant stenosis at the origin of the great vessels.      Impression    IMPRESSION:  HEAD MRI:  1.  No acute findings.  2.  Age-related changes.  3.  Abnormal left vertebral artery flow void is stable from 8/30/2023    HEAD MRA:  1.  No flow within the left V4 segment compatible with proximal occlusion.  2.  Otherwise unremarkable.    NECK MRA:  1.  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  2.  Otherwise unremarkable.

## 2024-08-27 NOTE — PLAN OF CARE
"Goal Outcome Evaluation:  Disoriented to both time and situation throughout the day. Continues to be unable to remember the events leading up to his admission but more oriented to day, month, and year by time of discharge. Expresses disbelief over being intoxicated upon admission, \"I only had a cocktail around noon\", and concerned that he does not remember meeting his friend for a baseball game. Per son, pt has had other recent episodes of drinking to excess and stated \"I think he forgets that he already had a drink\". Good equal strength and steady gait. Pleasant and cooperative. Daughter in law Marycarmen at bedside. No signs/symptoms of alcohol withdrawal--family received education and handout about what signs and symptoms to look for and when to access emergency or medical help. Pt counseled on abstaining from alcohol consumption and RN spoke with family about checking pt's home environment for remaining alcohol that should be removed. Pt also cautioned against driving until confusion fully lifts. Family agrees to have daily visual contact with pt and will provide assistance making and getting to follow up appointments. All questions answered. Pt discharged home with family, copy of AVS given to daughter in law with pt permission.                          "

## 2024-08-27 NOTE — PROGRESS NOTES
Blood glucose checked this morning per orders. BG = 66 at 0842, no signs/symptoms of hypoglycemia. Pt drank orange juice and ate breakfast. Recheck BG = 116 at 0907. Hypoglycemia protocol ordered by provider.

## 2024-08-27 NOTE — CONSULTS
"      [facility]     Stroke Code Note          History of Present Illness     Chief Complaint: Stroke Symptoms      Richard Kitchen is a 74 year old right-handed male who presents to the hospital because of acute onset of slurred speech, confusion that started around 7:45 PM.  He takes atorvastatin 40 mg daily for dyslipidemia.  He has history of melanoma.  Because of the above symptoms tier 1 stroke code was activated.          Past Medical History     Stroke risk factors: hyperlipidemia     Preadmission antithrombotic regimen: none     Modified Dougie Score (Pre-morbid)    -  0                   Assessment and Plan       1.  Acute onset of slurred speech, confusion, and on physical examination left dysmetria was noted, speech has improved and is back to baseline, there might be possibly small ischemic stroke in the left cerebellar hemisphere in the setting of left vertebral artery occlusion  -Chronic left vert occlusion     Intravenous Thrombolysis  Not given due to:   - minor/isolated/quickly resolving symptoms     Endovascular Treatment  Proximal vessel occlusion present, but endovascular treatment not initiated due to chronic left vertebral      Plan:  - Use orderset: \"Ischemic Stroke Routine Admission\" or \"Ischemic Stroke No Thrombolytics/No Thrombectomy ICU Admission\"  - Place Neurology IP Stroke Consult order   - Neurochecks and Vital Signs every 4 hours   - Permissive HTN; goal SBP < 220 mmHg  - Daily aspirin 81 mg for secondary stroke prevention  - Plavix (clopidogrel) 300 mg PO loading dose x 1  - Plavix (clopidogrel) 75 mg PO Daily  - Statin: atorvastatin 40 mg daily, will adjust medication after lipid panel according to the LDL level, and goal of LDL is below 70  - MRI Brain with and without contrast  - TTE (with Bubble Study if age 60 yrs or less)  - Telemetry, EKG  - Bedside Glucose Monitoring  - A1c, Lipid Panel, Troponin x 3  - PT/OT/SLP  - Stroke Education  - Euthermia, " "Euglycemia     ___________________________________________________________________    The Stroke Staff is Dr. Jorgensen.    Jimenez Butt MD  Vascular Neurology Fellow    To page me or covering stroke neurology team member, click here: AMCOM  Choose \"On Call\" tab at top, then select \"NEUROLOGY/ALL SITES\" from middle drop-down box, press Enter, then look for \"stroke\" or \"telestroke\" for your site.  ___________________________________________________________________        Imaging/Labs   (personally reviewed )    CT head: No acute intracranial hemorrhage  CTA head/neck: Chronic left vertebral occlusion  CT Perfusion head: Not performed         Physical Examination     BP: (!) 116/90   Pulse: 54   Resp: 11   Temp: 97.1  F (36.2  C)   Temp src: Temporal   SpO2: 97 %   O2 Device: None (Room air)        Wt Readings from Last 2 Encounters:   05/07/24 90.3 kg (199 lb)   08/16/23 90.9 kg (200 lb 4.8 oz)       Neuro Exam  Mental Status:  alert, oriented x 3, follows commands, speech clear and fluent, naming and repetition normal  Cranial Nerves:  visual fields intact (tested by nurse), EOMI with normal smooth pursuit, facial sensation intact and symmetric (tested by nurse), facial movements symmetric, hearing not formally tested but intact to conversation, no dysarthria, shoulder shrug equal bilaterally, tongue protrusion midline  Motor:  no abnormal movements, able to move all limbs antigravity spontaneously with no signs of hemiparesis observed, no pronator drift  Reflexes:  unable to test (telestroke)  Sensory:  light touch sensation intact and symmetric throughout upper and lower extremities (assessed by nurse), no extinction on double simultaneous stimulation (assessed by nurse)  Coordination:  rapid alternating movements symmetric, there may be mild left arm dysmetria  Station/Gait:  unable to test due to telestroke        Stroke Scales       NIHSS  1a. Level of Consciousness 0-->Alert, keenly responsive   1b. LOC " Questions 0-->Answers both questions correctly   1c. LOC Commands 0-->Performs both tasks correctly   2.   Best Gaze 0-->Normal   3.   Visual 0-->No visual loss   4.   Facial Palsy 0-->Normal symmetrical movements   5a. Motor Arm, Left 0-->No drift, limb holds 90 (or 45) degrees for full 10 secs   5b. Motor Arm, Right 0-->No drift, limb holds 90 (or 45) degrees for full 10 secs   6a. Motor Leg, Left 0-->No drift, leg holds 30 degree position for full 5 secs   6b. Motor Leg, right 0-->No drift, leg holds 30 degree position for full 5 secs   7.   Limb Ataxia 1-->Present in one limb   8.   Sensory 0-->Normal, no sensory loss   9.   Best Language 0-->No aphasia, normal   10. Dysarthria 0-->Normal   11. Extinction and Inattention  0-->No abnormality   Total 1 (08/26/24 2109)            Labs     CBC  Lab Results   Component Value Date    HGB 14.8 08/26/2024    HCT 44.6 08/26/2024    WBC 5.1 08/26/2024     08/26/2024       BMP  Lab Results   Component Value Date     08/26/2024    POTASSIUM 4.2 08/26/2024    CHLORIDE 105 08/26/2024    CO2 27 08/26/2024    BUN 15.9 08/26/2024    CR 1.19 (H) 08/26/2024     (H) 08/26/2024    TIFFANIE 9.9 08/26/2024       INR  INR   Date Value Ref Range Status   08/26/2024 1.04 0.85 - 1.15 Final       Data   Stroke Code Data  (for stroke code with tele)  Stroke code activated 08/26/24 2047   First stroke provider response 08/26/24 2050   Video start time 08/26/24 2056   Video end time 08/26/24 2124   Last known normal 08/26/24 1945   Time of discovery (or onset of symptoms)  08/26/24 1945   Head CT read by Stroke Neuro Provider 08/26/24 2115   Was stroke code de-escalated? Yes  08/26/24 2125     Telestroke Service Details  Type of service telemedicine diagnostic assessment of acute neurological changes   Reason telemedicine is appropriate patient requires assessment with a specialist for diagnosis and treatment of neurological symptoms   Mode of transmission secure  interactive audio and video communication per Lisa   Originating site (patient location) [facility]    Distant site (provider location) Provider remote site        Clinically Significant Risk Factors Present on Admission                  # Hypertension: Home medication list includes antihypertensive(s)

## 2024-08-27 NOTE — CONSULTS
[facility]    Stroke Consult Note    Reason for Consult:  confusion    Chief Complaint: Stroke Symptoms       HPI  Richard Kitchen is a 74 year old male with past medical history significant for mild cognitive impairment, hypertension, hyperlipidemia, CKD and EtOH was brought in for confusion.    Richard is a poor historian given he is amnestic to the event surrounding his hospitalization, he states that the last thing that he remembers is watching television at around 2 PM at his home, he does not remember going to the baseball game with his friend.  Son states that when his friend visited him to take him to the baseball game he was found to be drunk and disheveled.  Later he was noted to have slurred speech, confusion the baseball game so he was brought in for further evaluation. Patient states that the next thing he remember is waking up in hospital this morning.  On admission stroke workup was largely unremarkable except for chronic left vert occlusion.      Son states that he has been cognitively declining for the past 3 years, he carries a diagnosis of mild cognitive impairment and sees Dr. Stuart as outpatient.  He was referred to neuropsych evaluation.  Son states that he is independent with his ADLs at baseline, does not need any ambulatory aids to get around.      Stroke Evaluation Summarized    MRI/Head CT CT head was negative for any acute hemorrhage  MRI brain was negative for any acute ischemic infarct   Intracranial Vasculature Was negative for any actionable large vessel occlusion but showed chronic left vert occlusion   Cervical Vasculature No LVO or hemodynamically gnosis     Echocardiogram N/A   EKG/Telemetry Sinus bradycardia   Other Testing Not Applicable      LDL  8/26/2024: 63 mg/dL   A1C  8/26/2024: 5.8 %   Troponin 8/26/2024: 11 ng/L       Impression    Acute encephalopathy/incoordination likely due to Et0h intox     Recommendations   Delirium precautions  "  Discontinue ASA/Plavix    Patient Follow-up    - in 6-8 weeks with general neurology ([phone])    Thank you for this consult. No further stroke evaluation is recommended, so we will sign off. Please contact us with any additional questions.    The Stroke Staff is Dr. Ansari.    Reagan Abdi MD  Vascular Neurology Fellow    To page me or covering stroke neurology team member, click here: AMCOM  Choose \"On Call\" tab at top, then select \"NEUROLOGY/ALL SITES\" from middle drop-down box, press Enter, then look for \"stroke\" or \"telestroke\" for your site.  _____________________________________________________    Clinically Significant Risk Factors Present on Admission                  # Hypertension: Home medication list includes antihypertensive(s)                          Past Medical History    Past Medical History:   Diagnosis Date    Hernia, abdominal     Malignant melanoma (H) 03/15/2022    Mumps     Skin cancer      Medications   Home Meds  Prior to Admission medications    Medication Sig Start Date End Date Taking? Authorizing Provider   ACE/ARB/ARNI NOT PRESCRIBED (INTENTIONAL) Please choose reason not prescribed from choices below.    Susanna Acosta MD   atorvastatin (LIPITOR) 40 MG tablet Take 1 tablet (40 mg) by mouth daily  Patient not taking: Reported on 8/26/2024 5/7/24   Susanna Acosta MD       Scheduled Meds  Current Facility-Administered Medications   Medication Dose Route Frequency Provider Last Rate Last Admin    [START ON 8/28/2024] aspirin EC tablet 81 mg  81 mg Oral Daily Khoa Aguilar MD        Or    [START ON 8/28/2024] aspirin (ASA) chewable tablet 81 mg  81 mg Oral or NG Tube Daily Khoa Aguilar MD        atorvastatin (LIPITOR) tablet 40 mg  40 mg Oral or Feeding Tube QPM Khoa Aguilar MD   40 mg at 08/26/24 2252    [START ON 8/28/2024] clopidogrel (PLAVIX) tablet 75 mg  75 mg Oral or NG Tube Daily Khoa Aguilar MD           Infusion " Meds  Current Facility-Administered Medications   Medication Dose Route Frequency Provider Last Rate Last Admin       Allergies   Allergies   Allergen Reactions    Similasan Hay Fever Relief [Cold-Eeze]     Seasonal Allergies Other (See Comments)     Rhinitis           PHYSICAL EXAMINATION   Temp:  [97.1  F (36.2  C)-98.1  F (36.7  C)] 98.1  F (36.7  C)  Pulse:  [53-61] 61  Resp:  [11-18] 18  BP: ()/(64-90) 134/83  SpO2:  [94 %-98 %] 95 %    Neurologic  Mental Status:  alert, oriented x 2, follows commands, speech clear and fluent, naming and repetition normal  Cranial Nerves:  visual fields intact, PERRL, EOMI with normal smooth pursuit, facial sensation intact and symmetric, facial movements symmetric, hearing not formally tested but intact to conversation, palate elevation symmetric and uvula midline, no dysarthria, shoulder shrug strong bilaterally, tongue protrusion midline  Motor:  normal muscle tone and bulk, no abnormal movements, able to move all limbs spontaneously, strength 5/5 throughout upper and lower extremities, no pronator drift  Reflexes:  toes down-going  Sensory:  light touch sensation intact and symmetric throughout upper and lower extremities, no extinction on double simultaneous stimulation   Coordination:  normal finger-to-nose and heel-to-shin bilaterally without dysmetria, rapid alternating movements symmetric  Station/Gait:  deferred    Stroke Scales    NIHSS  1a. Level of Consciousness 0-->Alert, keenly responsive   1b. LOC Questions 0-->Answers both questions correctly   1c. LOC Commands 0-->Performs both tasks correctly   2.   Best Gaze 0-->Normal   3.   Visual 0-->No visual loss   4.   Facial Palsy 0-->Normal symmetrical movements   5a. Motor Arm, Left 0-->No drift, limb holds 90 (or 45) degrees for full 10 secs   5b. Motor Arm, Right 0-->No drift, limb holds 90 (or 45) degrees for full 10 secs   6a. Motor Leg, Left 0-->No drift, leg holds 30 degree position for full 5 secs   6b.  Motor Leg, right 0-->No drift, leg holds 30 degree position for full 5 secs   7.   Limb Ataxia 0-->Absent   8.   Sensory 0-->Normal, no sensory loss   9.   Best Language 0-->No aphasia, normal   10. Dysarthria 0-->Normal   11. Extinction and Inattention  0-->No abnormality   Total 0 (08/27/24 0900)       Imaging  I personally reviewed all imaging; relevant findings per HPI.    Labs Data   CBC  Recent Labs   Lab 08/26/24 2046   WBC 5.1   RBC 4.77   HGB 14.8   HCT 44.6        Basic Metabolic Panel   Recent Labs   Lab 08/27/24  0842 08/26/24 2046   NA  --  143   POTASSIUM  --  4.2   CHLORIDE  --  105   CO2  --  27   BUN  --  15.9   CR  --  1.19*   GLC 66* 105*   TIFFANIE  --  9.9     Liver Panel  Recent Labs   Lab 08/26/24 2046   PROTTOTAL 7.6   ALBUMIN 4.6   BILITOTAL 0.4   ALKPHOS 56   AST 24   ALT 25     INR    Recent Labs   Lab Test 08/26/24 2046   INR 1.04

## 2024-08-27 NOTE — ED PROVIDER NOTES
Emergency Department Note      History of Present Illness     Chief Complaint  Stroke Symptoms      HPI  Richard Kitchen is a 74 year old male who presents with stroke-like symptoms. Per patient's son, Vaughn was at the [employer] game when the people they were sharing a table with up in the box section noticed he began slurring his speech, and also fell to the ground a few times. There was no mention of facial droop. Upon asking the patient, he is unsure what happened. Son adds that he has some underlying memory issues at baseline. Lives at home alone with his dog.  The son wondered if the patient had been drinking more tonight.  It sounds like he does drink but unsure how much.  It is unclear exactly when the last known well was but it was somewhere around 630 or 7 PM.    Independent Historian  Son, as per HPI    Review of External Notes  Reviewed his doctor notes from 7/15/2024    Past Medical History   Medical History and Problem List  Abdominal hernia   Malignant melanoma     Medications  Atorvastatin    Surgical History   Skin cancer biopsy   Hernia repair   Vasectomy     Physical Exam   Patient Vitals for the past 24 hrs:   BP Temp Temp src Pulse Resp SpO2   08/26/24 2200 124/74 -- -- 53 -- 97 %   08/26/24 2150 98/77 -- -- 53 -- 97 %   08/26/24 2130 123/83 -- -- 55 11 --   08/26/24 2115 111/82 -- -- 56 16 96 %   08/26/24 2100 (!) 116/90 -- -- 54 11 97 %   08/26/24 2045 104/64 97.1  F (36.2  C) Temporal 59 17 98 %     Physical Exam  Nursing note and vitals reviewed.    Constitutional:  Appears comfortable.  Alcohol smell on breath.   HENT:    Nose normal.  No discharge.      Oral mucosa is moist.  No tongue biting.  Eyes:    Conjunctivae are normal without injection.  Pupils are equal.  Cardiovascular:  Normal rate, regular rhythm with normal S1 and S2.      Normal heart sounds and peripheral pulses 2+ and equal.    Pulmonary:  Effort normal and breath sounds clear to auscultation bilaterally.      GI:    Soft. No  distension and no mass. No tenderness.      No rebound and no guarding. No flank pain.  No HSM.  Musculoskeletal:  Normal range of motion. No extremity deformity.     No edema and no tenderness.    Neurological:   GCS 15. NIH stroke scale score 1. O X 3.  No sensory deficit. Normal strength in all extremities.   Normal finger to nose. Normal heel-knee-shin but had a hard time understanding how to do it. No hand drift. No leg drift.   Visual fields full. EOMs intact. No diplopia. No facial droop. No slurred speech.   He has a hard time understanding directions at some point, occasionally had difficulty with words when asked him to repeat a sentence.  Mental status is awake and alert and memory seems to be impaired.  Skin:    Skin is warm and dry. No rash noted. No diaphoresis.      No erythema. No pallor.  No lesions.  Psychiatric:   Behavior is normal. Appropriate mood and affect.     Underlying dementia at baseline.      Diagnostics   Lab Results   Labs Ordered and Resulted from Time of ED Arrival to Time of ED Departure   BASIC METABOLIC PANEL - Abnormal       Result Value    Sodium 143      Potassium 4.2      Chloride 105      Carbon Dioxide (CO2) 27      Anion Gap 11      Urea Nitrogen 15.9      Creatinine 1.19 (*)     GFR Estimate 63      Calcium 9.9      Glucose 105 (*)    ETHYL ALCOHOL LEVEL - Abnormal    Alcohol ethyl 0.25 (*)    MAGNESIUM - Abnormal    Magnesium 2.6 (*)    HEMOGLOBIN A1C - Abnormal    Hemoglobin A1C 5.8 (*)    INR - Normal    INR 1.04     PARTIAL THROMBOPLASTIN TIME - Normal    aPTT 37     TROPONIN T, HIGH SENSITIVITY - Normal    Troponin T, High Sensitivity 11     HEPATIC FUNCTION PANEL - Normal    Protein Total 7.6      Albumin 4.6      Bilirubin Total 0.4      Alkaline Phosphatase 56      AST 24      ALT 25      Bilirubin Direct <0.20     UA MACROSCOPIC WITH REFLEX TO MICRO AND CULTURE - Normal    Color Urine Straw      Appearance Urine Clear      Glucose Urine Negative      Bilirubin  Urine Negative      Ketones Urine Negative      Specific Gravity Urine 1.015      Blood Urine Negative      pH Urine 6.0      Protein Albumin Urine Negative      Urobilinogen Urine Normal      Nitrite Urine Negative      Leukocyte Esterase Urine Negative     CBC WITH PLATELETS AND DIFFERENTIAL    WBC Count 5.1      RBC Count 4.77      Hemoglobin 14.8      Hematocrit 44.6      MCV 94      MCH 31.0      MCHC 33.2      RDW 13.2      Platelet Count 157      % Neutrophils 54      % Lymphocytes 31      % Monocytes 10      % Eosinophils 3      % Basophils 1      % Immature Granulocytes 0      NRBCs per 100 WBC 0      Absolute Neutrophils 2.8      Absolute Lymphocytes 1.6      Absolute Monocytes 0.5      Absolute Eosinophils 0.2      Absolute Basophils 0.1      Absolute Immature Granulocytes 0.0      Absolute NRBCs 0.0     GLUCOSE MONITOR NURSING POCT   LIPID REFLEX TO DIRECT LDL PANEL     Imaging  CTA Head Neck with Contrast   Final Result   IMPRESSION:    1.  Asymmetric diminished opacification of the left vertebral artery with nonopacified V3 segment. This is favored to reflect a chronic occlusion versus dissection as alteration of flow was also seen on the 8/30/2023 MRI.   2.  The remaining major vessels of the head and neck are patent.      Dr. Moses was contacted by me on 8/26/2024 8:59 PM CDT with the preliminary report.      CT Head w/o Contrast   Final Result   IMPRESSION:   1.  No acute intracranial process.      Dr. Moses was contacted by me on 8/26/2024 8:59 PM CDT with the preliminary report.         MR Brain w/o & w Contrast    (Results Pending)   MRA Neck (Carotids) w/o & w Contrast    (Results Pending)   MRA Brain ([place]) w/o Contrast    (Results Pending)     EKG   ECG results from 08/26/24   EKG 12-lead, tracing only     Value    Systolic Blood Pressure     Diastolic Blood Pressure     Ventricular Rate 55    Atrial Rate 55    ND Interval 200    QRS Duration 82        QTc 417    P Axis 71     R AXIS 32    T Axis 50    Interpretation ECG      Sinus bradycardia  Low voltage QRS  Cannot rule out Anterior infarct , age undetermined    Read by: Dr. Josué MD         Independent Interpretation  None    ED Course    Medications Administered  Medications   atorvastatin (LIPITOR) tablet 40 mg (40 mg Oral or Feeding Tube $Given 8/26/24 2252)   iopamidol (ISOVUE-370) solution 67 mL (67 mLs Intravenous $Given 8/26/24 2048)     And   sodium chloride 0.9 % bag for CT scan flush use (100 mLs As instructed $Given 8/26/24 2048)   aspirin (ASA) tablet 325 mg (325 mg Oral $Given 8/26/24 2150)   clopidogrel (PLAVIX) tablet 300 mg (300 mg Oral $Given 8/26/24 2150)   multivitamin, therapeutic (THERA-VIT) tablet 1 tablet (1 tablet Oral $Given 8/26/24 2228)   folic acid (FOLVITE) tablet 1 mg (1 mg Oral $Given 8/26/24 2229)   thiamine (B-1) tablet 100 mg (100 mg Oral $Given 8/26/24 2229)   magnesium oxide (MAG-OX) tablet 800 mg (800 mg Oral $Given 8/26/24 2229)     Discussion of Management  None    Social Determinants of Health adding to complexity of care  Baseline dementia, living alone.     ED Course  ED Course as of 08/27/24 0001   Mon Aug 26, 2024   2056 I evaluated the patient.    2059 Consult with Dr. Catalan, radiology.    2104 Consult with Dr. Catalan.    2105 Rechecked and updated.    2152 Consult with stroke neuro   2221 C/w Dr. Aguilar.      Medical Decision Making / Diagnosis   CMS Diagnoses: None  National Institutes of Health Stroke Scale  Exam Interval: Baseline   Score    Level of consciousness: (0)   Alert, keenly responsive    LOC questions: (0)   Answers both questions correctly    LOC commands: (0)   Performs both tasks correctly    Best gaze: (0)   Normal    Visual: (0)   No visual loss    Facial palsy: (0)   Normal symmetrical movements    Motor arm (left): (0)   No drift    Motor arm (right): (0)   No drift    Motor leg (left): (0)   No drift    Motor leg (right): (0)   No drift    Limb ataxia: (0)   Absent     Sensory: (0)   Normal- no sensory loss    Best language: (0)   Normal- no aphasia    Dysarthria: (1)   Mild to moderate dysarthria    Extinction and inattention: (0)   No abnormality        Total Score:  1         MIPS  None    MDM  Richard Kitchen is a 74 year old male who comes in because of stumbling around and seemingly more confused than his baseline dementia at the game tonight.  A tier 1 code stroke was called and patient went to CT and CTA and the CT of the head was unremarkable but the CTA showed dissection in the left vertebral artery which appeared old according the radiologist compared to a previous MRI.  Stroke neuro was involved and saw the patient as well.  The patient's blood alcohol came back at 0.25, comprehensive panel essentially normal, magnesium 2.6, troponin 11 and CBC normal.  Urine unremarkable.  He was de-escalated, his symptoms certainly could be a combination of the alcohol intoxication, his age, and his underlying memory loss.  He has been worked up for dementia at the current time according to the son.  Stroke neuro wanted him to have an MRI and MRA and this is ordered.  I talked to Dr. Aguilar who will be the admitting hospitalist.  If the MRI is normal and overnight as he barry up and the effects of the alcohol wear off if he is back to his baseline, then he will be discharged with follow-up in the clinic.  Encouraged him to stop drinking and encouraged the son to continue the evaluation for memory loss and dementia.    Critical care time minus procedures: 50 minutes    Disposition  The patient was admitted to the hospital.     ICD-10 Codes:    ICD-10-CM    1. Confusion  R41.0       2. Alcoholic intoxication without complication (H24)  F10.920       3. History of memory loss  Z87.898          Scribe Disclosure:  I, LARRY MANUEL, am serving as a scribe at 8:57 PM on 8/26/2024 to document services personally performed by Consuelo Moses MD based on my observations and the  provider's statements to me.     [employer]      Consuelo Moses MD  08/27/24 0001

## 2024-08-27 NOTE — PROGRESS NOTES
"VSS. Tolerating regular diet. No pain. Up with SBA to BR, voiding without difficulty. Steady gait. No memory of events leading up to admission and remains disoriented to time and situation. NIH = 2. Son at bedside, states that pt has had other occasions of being found \"passed out\" at home with \"a bottle beside you\" after not showing up for a planned meeting or event with family. Son states that pt has had some progressive cognitive decline over the past few years; pt has been following with outpatient neurology and has neuropsych testing scheduled for 1/3/25. Awaiting echocardiogram.   "

## 2024-08-27 NOTE — DISCHARGE SUMMARY
[facility]  Hospitalist Discharge Summary      Date of Admission:  8/26/2024  Date of Discharge:  8/27/2024  Discharging Provider: Bernadette Aparicio PA-C  Discharge Service: Hospitalist Service    Discharge Diagnoses   Metabolic encephalopathy, likely secondary to alcohol intoxication  HLD  Chronic left vertebral artery occlusion   Mild cognitive impairment  CKD II  Hx melanoma     Clinically Significant Risk Factors          Follow-ups Needed After Discharge   Follow-up Appointments     Follow-up and recommended labs and tests       Follow up with primary care provider within one week for hospital follow   up. Discuss whether to start stain, will avoid at this time as your   cholesterol is controlled without use.     Follow up with your Neurologist in 6-8 weeks for hospital follow up.   Please call to schedule this.        Discuss cognition/home safety. At time of discharge family feels patient currently safe at home and plans to check in frequently but reports he continues to decline fairly significantly. Resources provided for JENNIFER exploration in case this is needed in the future. Follow up on alcohol cessation. Consider repeat MOCA with OT      Discharge Disposition   Discharged to home  Condition at discharge: Stable    Hospital Course   Suspected metabolic encephalopathy and slurred speech secondary to alcohol intoxication  Possible TIA   Hyperlipidemia   Chronic left vertebral artery occlusion   *Presents with slurred speech, confusion, falls.  *GEM 0.25. Reports two cocktails prior to attending the game. Does not typically drink daily though family reports several instances of patient missing events, etc and being found with alcohol. Patient admits to over use at least weekly.   *Head CT with no acute intracranial process.   *CTA head/neck with asymmetric diminished opacification of the left vertebral artery with nonopacified V3 segment, favored to reflect a chronic occlusion  versus dissection, remaining major vessels of the head and neck are patent  *MRI brain with no acute findings, abnormal left vertebral artery flow void stable from 8/30/2023.  *MRA head with no flow within the left V4 segment compatible with proximal occlusion, otherwise unremarkable  *MRA neck with nondominant left vertebral artery occluded at the distal V2 segment, likely on a chronic basis as similar to 8/30/2023 as above, otherwise unremarkable  *Constellation of presenting symptoms likely caused by acute alcohol intoxication; stroke neurology recommended admission to complete stroke/TIA work-up    - Neurology consulted, initially loaded with asa and plavix, recommend both discontinued at discharge.   - Echocardiogram with neg bubble   - HgbA1c 5.8%, lipid panel: Tchol 143, HDL 54, LDL 63- he reports he is not currently taking statin. Will not initiate at this time given LDL 63.   --lengthy discussion had with patient and DIL regarding alcohol use. Patient plans to stop consuming alcohol completely and this was strongly encouraged. Encouraged family to encourage this particularly with cognitive impairment.     Mild cognitive impairment  Has seen neurology as outpatient. Last scored 24/30 on MOCA in 3/2024. Per family patient continues to decline. Currently they feel he is safe at home as long as he is not drinking as he has been caring for himself and his home adequately. Formal neuropsych testing planned for January. Discussed with family to reach out to PCP if increasing concern/safety concerns at home. They will check on patient frequently. Could consider repeat OT resting in the meantime for more objective assessment.   - Continue outpatient follow-up      Chronic kidney disease, stage 2  Baseline creatinine 1.1-1.2. Creatinine 1.19 on admission.   - Currently around baseline     Hx of malignant melanoma  - Noted        Consultations This Hospital Stay   NEUROLOGY IP STROKE CONSULT  PHYSICAL THERAPY ADULT IP  CONSULT    Code Status   Full Code    Time Spent on this Encounter   I, Bernadette Aparicio PA-C, personally saw the patient today and spent greater than 30 minutes discharging this patient.       Berandette Aparicio PA-C  [facility] NEUROSCIENCE UNIT  [address] [address]  Phone: [phone]  ______________________________________________________________________    Physical Exam   Temp: 98.1  F (36.7  C) Temp src: Oral BP: 134/83 Pulse: 61   Resp: 18 SpO2: 95 % O2 Device: None (Room air)    There were no vitals filed for this visit.  Vital Signs with Ranges  Temp:  [97.1  F (36.2  C)-98.1  F (36.7  C)] 98.1  F (36.7  C)  Pulse:  [53-61] 61  Resp:  [11-18] 18  BP: ()/(64-90) 134/83  SpO2:  [94 %-98 %] 95 %  I/O last 3 completed shifts:  In: 360 [P.O.:360]  Out: -     Constitutional: Alert, oriented x2. forgetful. Says month is October, year 2014.   ENT: moist mucous membranes  Respiratory: Lungs clear to auscultation bilaterally, no increased work of breathing  Cardiovascular: Regular rate and rhythm  GI: active bowel sounds, abdomen soft, non-tender  Neuro:  speech is clear. Face symmetric. Tongue midline.  CN 2-12 grossly intact.   MSK:  strength 5/5 and equal bilaterally          Primary Care Physician   Susanna Acosta    Discharge Orders      Reason for your hospital stay    You were here for evaluation of slurred speech, unstable gait, confusion     Activity    Your activity upon discharge: activity as tolerated     Discharge Instructions    We recommend avoiding ANY alcohol consumption     Follow-up and recommended labs and tests     Follow up with primary care provider within one week for hospital follow up. Discuss whether to start stain, will avoid at this time as your cholesterol is controlled without use.     Follow up with your Neurologist in 6-8 weeks for hospital follow up. Please call to schedule this.     Diet    Follow this diet upon discharge: Current  Diet:Orders Placed This Encounter      Combination Diet Regular Diet       Significant Results and Procedures   Most Recent 3 CBC's:  Recent Labs   Lab Test 08/26/24 2046 05/07/24  0902 04/05/23  1007   WBC 5.1 4.7 5.3   HGB 14.8 14.1 14.7   MCV 94 91 91    166 139*     Most Recent 3 BMP's:  Recent Labs   Lab Test 08/27/24  1300 08/27/24  0907 08/27/24  0842 08/26/24 2046 05/07/24  0902 04/05/23  1007   NA  --   --   --  143 140 141   POTASSIUM  --   --   --  4.2 4.1 4.0   CHLORIDE  --   --   --  105 105 102   CO2  --   --   --  27 28 27   BUN  --   --   --  15.9 13.8 13.6   CR  --   --   --  1.19* 1.12 1.08   ANIONGAP  --   --   --  11 7 12   TIFFANIE  --   --   --  9.9 9.7 10.0   GLC 76 116* 66* 105* 108* 101*     Most Recent 2 LFT's:  Recent Labs   Lab Test 08/26/24 2046 05/07/24  0902   AST 24 24   ALT 25 19   ALKPHOS 56 51   BILITOTAL 0.4 0.7     Most Recent Cholesterol Panel:  Recent Labs   Lab Test 08/26/24 2046   CHOL 143   LDL 63   HDL 54   TRIG 129     Most Recent Hemoglobin A1c:  Recent Labs   Lab Test 08/26/24 2046   A1C 5.8*     Most Recent Urinalysis:  Recent Labs   Lab Test 08/26/24 2136 07/28/22  1047   COLOR Straw Yellow   APPEARANCE Clear Clear   URINEGLC Negative Negative   URINEBILI Negative Negative   URINEKETONE Negative Negative   SG 1.015 1.010   UBLD Negative Negative   URINEPH 6.0 6.0   PROTEIN Negative Negative   UROBILINOGEN  --  0.2   NITRITE Negative Negative   LEUKEST Negative Negative   ,   Results for orders placed or performed during the hospital encounter of 08/26/24   CT Head w/o Contrast    Narrative    EXAM: CT HEAD W/O CONTRAST  LOCATION: [facility]  DATE/TIME: 8/26/2024 8:52 PM CDT    INDICATION: Code stroke; slurred speech  COMPARISON:  MRI brain 8/30/2023.  TECHNIQUE: Routine CT Head without IV contrast. Multiplanar reformats. Dose reduction techniques were used.    FINDINGS:   INTRACRANIAL CONTENTS: No intracranial hemorrhage, extraaxial  collection, or mass effect.  No CT evidence of acute infarct. Normal parenchymal attenuation for age. Normal ventricles and sulci for age.     VISUALIZED ORBITS/SINUSES/MASTOIDS: No intraorbital abnormality. No significant paranasal sinus mucosal disease. No middle ear or mastoid effusion.    BONES/SOFT TISSUES: No acute abnormality.      Impression    IMPRESSION:  1.  No acute intracranial process.    Dr. Moses was contacted by me on 8/26/2024 8:59 PM CDT with the preliminary report.     CTA Head Neck with Contrast    Narrative    EXAM: CTA HEAD NECK W CONTRAST  LOCATION: [facility]  DATE/TIME: 8/26/2024 8:58 PM CDT    INDICATION: Code stroke; slurred speech  COMPARISON: Same day CT head without contrast, MRI brain 8/30/2023  CONTRAST: 67 mL Isovue 370  TECHNIQUE: Head and neck CT angiogram with IV contrast. Axial helical CT images of the head and neck vessels obtained during the arterial phase of intravenous contrast administration. Axial 2D reconstructed images and multiplanar 3D MIP reconstructed   images of the head and neck vessels were performed by the technologist. Dose reduction techniques were used. All stenosis measurements made according to NASCET criteria unless otherwise specified.    FINDINGS:   HEAD CTA:  ANTERIOR CIRCULATION: No stenosis/occlusion, aneurysm, or high flow vascular malformation. Fetal origin of the right posterior cerebral artery from the anterior circulation.    POSTERIOR CIRCULATION:  Retrograde opacification of the left V4 with nonopacification of the V3/V4 junction. The remaining posterior circulation is patent. No aneurysm or high flow vascular malformation.    DURAL VENOUS SINUSES: Expected enhancement of the major dural venous sinuses.    NECK CTA:  RIGHT CAROTID: No measurable stenosis or dissection.    LEFT CAROTID: No measurable stenosis or dissection.    VERTEBRAL ARTERIES:  Asymmetric diminished opacification of the left vertebral artery with  nonopacification of the V3 segment.  Dominant right and smaller left vertebral arteries.    AORTIC ARCH: Classic aortic arch anatomy with no significant stenosis at the origin of the great vessels.    NONVASCULAR STRUCTURES: Unremarkable.      Impression    IMPRESSION:   1.  Asymmetric diminished opacification of the left vertebral artery with nonopacified V3 segment. This is favored to reflect a chronic occlusion versus dissection as alteration of flow was also seen on the 8/30/2023 MRI.  2.  The remaining major vessels of the head and neck are patent.    Dr. Moses was contacted by me on 8/26/2024 8:59 PM CDT with the preliminary report.   MR Brain w/o & w Contrast    Narrative    EXAM: MR BRAIN W/O and W CONTRAST, MRA BRAIN ([place]) W/O CONTRAST, MRA NECK (CAROTIDS) W/O and W CONTRAST  LOCATION: [facility]  DATE: 8/27/2024    INDICATION: Stroke, left vert occlusion  COMPARISON: CTA head and neck 8/26/2024, brain MRI 8/30/2023  CONTRAST: 10 mL IV Gadavist  TECHNIQUE:   1) Routine multiplanar multisequence head MRI without and with intravenous contrast.  2) 3D time-of-flight head MRA without intravenous contrast.  3) Neck MRA without and with IV contrast. Stenosis measurements made according to NASCET criteria unless otherwise specified.    FINDINGS:  HEAD MRI:  INTRACRANIAL CONTENTS: No acute or subacute infarct. No mass, acute hemorrhage, or extra-axial fluid collections. Scattered nonspecific T2/FLAIR hyperintensities within the cerebral white matter most consistent with mild chronic microvascular ischemic   change. There is a focus of susceptibility signal in the right parietal cortex. Mild generalized cerebral atrophy. No hydrocephalus. Normal position of the cerebellar tonsils. No pathologic contrast enhancement.    SELLA: No abnormality accounting for technique.    OSSEOUS STRUCTURES/SOFT TISSUES: Normal marrow signal. Loss of the normal left vertebral artery flow void.      ORBITS: No abnormality accounting for technique.     SINUSES/MASTOIDS: No paranasal sinus mucosal disease. No middle ear or mastoid effusion.     HEAD MRA:   ANTERIOR CIRCULATION: No stenosis/occlusion, aneurysm, or high flow vascular malformation. Standard [place] of [place] anatomy.    POSTERIOR CIRCULATION: No stenosis/occlusion, aneurysm, or high flow vascular malformation. No flow within the left V4 segment compatible with proximal occlusion.     NECK MRA:   RIGHT CAROTID: No measurable stenosis or dissection.    LEFT CAROTID: No measurable stenosis or dissection.    VERTEBRAL ARTERIES: No focal stenosis or dissection. The nondominant left vertebral artery is occluded at the distal V4 segment. There is some high signal on the T1 fat suppressed images in this locale suggesting possible occlusive dissection.    AORTIC ARCH: Classic aortic arch anatomy with no significant stenosis at the origin of the great vessels.      Impression    IMPRESSION:  HEAD MRI:  1.  No acute findings.  2.  Age-related changes.  3.  Abnormal left vertebral artery flow void is stable from 8/30/2023    HEAD MRA:  1.  No flow within the left V4 segment compatible with proximal occlusion.  2.  Otherwise unremarkable.    NECK MRA:  1.  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  2.  Otherwise unremarkable.     MRA Neck (Carotids) w/o & w Contrast    Narrative    EXAM: MR BRAIN W/O and W CONTRAST, MRA BRAIN ([place]) W/O CONTRAST, MRA NECK (CAROTIDS) W/O and W CONTRAST  LOCATION: [facility]  DATE: 8/27/2024    INDICATION: Stroke, left vert occlusion  COMPARISON: CTA head and neck 8/26/2024, brain MRI 8/30/2023  CONTRAST: 10 mL IV Gadavist  TECHNIQUE:   1) Routine multiplanar multisequence head MRI without and with intravenous contrast.  2) 3D time-of-flight head MRA  without intravenous contrast.  3) Neck MRA without and with IV contrast. Stenosis measurements made according to NASCET criteria unless otherwise specified.    FINDINGS:  HEAD MRI:  INTRACRANIAL CONTENTS: No acute or subacute infarct. No mass, acute hemorrhage, or extra-axial fluid collections. Scattered nonspecific T2/FLAIR hyperintensities within the cerebral white matter most consistent with mild chronic microvascular ischemic   change. There is a focus of susceptibility signal in the right parietal cortex. Mild generalized cerebral atrophy. No hydrocephalus. Normal position of the cerebellar tonsils. No pathologic contrast enhancement.    SELLA: No abnormality accounting for technique.    OSSEOUS STRUCTURES/SOFT TISSUES: Normal marrow signal. Loss of the normal left vertebral artery flow void.     ORBITS: No abnormality accounting for technique.     SINUSES/MASTOIDS: No paranasal sinus mucosal disease. No middle ear or mastoid effusion.     HEAD MRA:   ANTERIOR CIRCULATION: No stenosis/occlusion, aneurysm, or high flow vascular malformation. Standard [place] of [place] anatomy.    POSTERIOR CIRCULATION: No stenosis/occlusion, aneurysm, or high flow vascular malformation. No flow within the left V4 segment compatible with proximal occlusion.     NECK MRA:   RIGHT CAROTID: No measurable stenosis or dissection.    LEFT CAROTID: No measurable stenosis or dissection.    VERTEBRAL ARTERIES: No focal stenosis or dissection. The nondominant left vertebral artery is occluded at the distal V4 segment. There is some high signal on the T1 fat suppressed images in this locale suggesting possible occlusive dissection.    AORTIC ARCH: Classic aortic arch anatomy with no significant stenosis at the origin of the great vessels.      Impression    IMPRESSION:  HEAD MRI:  1.  No acute findings.  2.  Age-related changes.  3.  Abnormal left vertebral artery flow void is stable from 8/30/2023    HEAD MRA:  1.  No flow within the left  V4 segment compatible with proximal occlusion.  2.  Otherwise unremarkable.    NECK MRA:  1.  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  2.  Otherwise unremarkable.     MRA Brain ([place]) w/o Contrast    Narrative    EXAM: MR BRAIN W/O and W CONTRAST, MRA BRAIN ([place]) W/O CONTRAST, MRA NECK (CAROTIDS) W/O and W CONTRAST  LOCATION: [facility]  DATE: 8/27/2024    INDICATION: Stroke, left vert occlusion  COMPARISON: CTA head and neck 8/26/2024, brain MRI 8/30/2023  CONTRAST: 10 mL IV Gadavist  TECHNIQUE:   1) Routine multiplanar multisequence head MRI without and with intravenous contrast.  2) 3D time-of-flight head MRA without intravenous contrast.  3) Neck MRA without and with IV contrast. Stenosis measurements made according to NASCET criteria unless otherwise specified.    FINDINGS:  HEAD MRI:  INTRACRANIAL CONTENTS: No acute or subacute infarct. No mass, acute hemorrhage, or extra-axial fluid collections. Scattered nonspecific T2/FLAIR hyperintensities within the cerebral white matter most consistent with mild chronic microvascular ischemic   change. There is a focus of susceptibility signal in the right parietal cortex. Mild generalized cerebral atrophy. No hydrocephalus. Normal position of the cerebellar tonsils. No pathologic contrast enhancement.    SELLA: No abnormality accounting for technique.    OSSEOUS STRUCTURES/SOFT TISSUES: Normal marrow signal. Loss of the normal left vertebral artery flow void.     ORBITS: No abnormality accounting for technique.     SINUSES/MASTOIDS: No paranasal sinus mucosal disease. No middle ear or mastoid effusion.     HEAD MRA:   ANTERIOR CIRCULATION: No stenosis/occlusion, aneurysm, or high flow vascular malformation. Standard [place] of [place] anatomy.    POSTERIOR CIRCULATION: No  stenosis/occlusion, aneurysm, or high flow vascular malformation. No flow within the left V4 segment compatible with proximal occlusion.     NECK MRA:   RIGHT CAROTID: No measurable stenosis or dissection.    LEFT CAROTID: No measurable stenosis or dissection.    VERTEBRAL ARTERIES: No focal stenosis or dissection. The nondominant left vertebral artery is occluded at the distal V4 segment. There is some high signal on the T1 fat suppressed images in this locale suggesting possible occlusive dissection.    AORTIC ARCH: Classic aortic arch anatomy with no significant stenosis at the origin of the great vessels.      Impression    IMPRESSION:  HEAD MRI:  1.  No acute findings.  2.  Age-related changes.  3.  Abnormal left vertebral artery flow void is stable from 2023    HEAD MRA:  1.  No flow within the left V4 segment compatible with proximal occlusion.  2.  Otherwise unremarkable.    NECK MRA:  1.  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  2.  Otherwise unremarkable.     Echocardiogram Complete    Narrative    [id number]  [id number]  [id number]  [id number]^VIKI^NATASHA^SYDNI     [facility]  Echocardiography Laboratory  [address] [place] [address]     Name: LA AGGARWAL  MRN: [id number]  : 1950  Study Date: 2024 09:31 AM  Age: 74 yrs  Gender: Male  Patient [facility]  Reason For Study: TIA  Ordering Physician: NATASHA DREW  Referring Physician: Susanna Acosta  Performed By: Marita Warner RDCS     BSA: 2.0 m2  Height: 68 in  Weight: 199 lb  HR: 80  BP: 125/76 mmHg  ______________________________________________________________________________  Procedure  Complete Portable Bubble Echo Adult. [employer] (NDC #9112-4361)  given  intravenously.  ______________________________________________________________________________  Interpretation Summary     1. Normal biventricular size and function. Left ventricular ejection fraction  of 60-65%.  2. No segmental wall motion abnormalities noted.  3. No hemodynamically significant valvular disease.  4. Bubble Study negative.  No prior study for comparison. Technically adequate study.  ______________________________________________________________________________  Left Ventricle  The left ventricle is normal in size. There is normal left ventricular wall  thickness. Left ventricular systolic function is normal.     Right Ventricle  The right ventricle is normal in size and function.     Atria  Normal left atrial size. Right atrial size is normal. A contrast injection  (Bubble Study) was performed that was negative for flow across the interatrial  septum.     Mitral Valve  The mitral valve leaflets appear normal. There is no evidence of stenosis,  fluttering, or prolapse. There is trace mitral regurgitation.     Tricuspid Valve  Normal tricuspid valve. There is trace tricuspid regurgitation.     Aortic Valve  There is mild trileaflet aortic sclerosis. There is trace aortic  regurgitation.     Pulmonic Valve  The pulmonic valve is not well visualized.     Vessels  Aortic root dilatation is present. Normal size ascending aorta. IVC diameter  <2.1 cm collapsing >50% with sniff suggests a normal RA pressure of 3 mmHg.     Pericardium  There is no pericardial effusion.     Rhythm  Sinus rhythm was noted.  ______________________________________________________________________________  MMode/2D Measurements & Calculations  IVSd: 0.92 cm     LVIDd: 4.7 cm  LVIDs: 2.5 cm  LVPWd: 0.88 cm  FS: 47.2 %  LV mass(C)d: 142.3 grams  LV mass(C)dI: 69.8 grams/m2  Ao root diam: 3.8 cm  LA dimension: 3.5 cm  asc Aorta Diam: 3.6 cm  LA/Ao: 0.92  LVOT diam: 2.2 cm  LVOT area: 3.9 cm2  Ao root diam index Ht(cm/m):  2.2  Ao root diam index BSA (cm/m2): 1.9  Asc Ao diam index BSA (cm/m2): 1.8  Asc Ao diam index Ht(cm/m): 2.1  RWT: 0.37     Doppler Measurements & Calculations  MV E max filiberto: 53.1 cm/sec  MV A max filiberto: 82.7 cm/sec  MV E/A: 0.64  MV dec time: 0.31 sec  PA acc time: 0.10 sec     TR max filiberto: 248.9 cm/sec  TR max P.8 mmHg  Medial E/e': 6.3  RV S Filiberto: 18.9 cm/sec     ______________________________________________________________________________  Report approved by: Kolby Minor 2024 11:49 AM             Discharge Medications   Current Discharge Medication List        CONTINUE these medications which have NOT CHANGED    Details   ACE/ARB/ARNI NOT PRESCRIBED (INTENTIONAL) Please choose reason not prescribed from choices below.           STOP taking these medications       atorvastatin (LIPITOR) 40 MG tablet Comments:   Reason for Stopping:             Allergies   Allergies   Allergen Reactions    Similasan Hay Fever Relief [Cold-Eeze]     Seasonal Allergies Other (See Comments)     Rhinitis

## 2024-08-28 ENCOUNTER — TELEPHONE (OUTPATIENT)
Dept: FAMILY MEDICINE | Facility: CLINIC | Age: 74
End: 2024-08-28
Payer: MEDICARE

## 2024-08-28 NOTE — TELEPHONE ENCOUNTER
Please call the patient and schedule Hospital follow up with me, which patient is due at this time, to further take care of the medical conditions and medications.OK to use same day slot / double book near another virtual slot in 1 weeks.     ( FYI - Pt doesn't respond [employer] messages - please call instead of sending [employer] message for scheduling )     Thank you,  Susanna Acosta MD on 8/28/2024

## 2024-08-28 NOTE — TELEPHONE ENCOUNTER
ED / Discharge Outreach Protocol    Patient Contact    Attempt # 1    Was call answered?  No.  Left message on voicemail with information to call me back.     Desi Zuluaga RN

## 2024-08-29 NOTE — TELEPHONE ENCOUNTER
"  Transitions of Care Outreach  Chief Complaint   Patient presents with    Hospital F/U     Observation 8/26-8/27     Pt states \"I don't recall being in the hospital recently\" \"I wasn't in the hospital\"    Most Recent Admission Date: 8/26/2024   Most Recent Admission Diagnosis: Confusion - R41.0  Alcoholic intoxication without complication (H24) - F10.920  History of memory loss - Z87.898     Most Recent Discharge Date: 8/27/2024   Most Recent Discharge Diagnosis: Confusion - R41.0  Alcoholic intoxication without complication (H24) - F10.920  History of memory loss - Z87.898  Encounter for Medicare annual wellness exam - Z00.00  Dyslipidemia - E78.5     Transitions of Care Assessment    Discharge Assessment  How are you doing now that you are home?: \"I dont recall being in the hospital\"  How are your symptoms? (Red Flag symptoms escalate to triage hotline per guidelines): Other  Do you know how to contact your clinic care team if you have future questions or changes to your health status? : Yes  Does the patient have their discharge instructions? : Unknown  Does the patient have questions regarding their discharge instructions? : No  Were you started on any new medications or were there changes to any of your previous medications? : No  Does the patient have all of their medications?: Yes  Do you have questions regarding any of your medications? : No  Do you have all of your needed medical supplies or equipment (DME)?  (i.e. oxygen tank, CPAP, cane, etc.): Yes    Follow up Plan     Discharge Follow-Up  Discharge follow up appointment scheduled in alignment with recommended follow up timeframe or Transitions of Risk Category? (Low = within 30 days; Moderate= within 14 days; High= within 7 days): Yes  Discharge Follow Up Appointment Date: 09/10/24  Discharge Follow Up Appointment Scheduled with?: Primary Care Provider    Future Appointments   Date Time Provider Department Center   9/10/2024 10:30 AM Susanna Acosta, " MD [facility]   12/12/2024 10:00 AM Annalee Spence PA-C [facility]   1/3/2025  8:00 AM Augusto Hernandez, PhD [facility]       Outpatient Plan as outlined on AVS reviewed with patient.    For any urgent concerns, please contact our 24 hour nurse triage line: [phone] ([phone])       Rosalie Cedeno RN

## 2024-09-10 ENCOUNTER — OFFICE VISIT (OUTPATIENT)
Dept: FAMILY MEDICINE | Facility: CLINIC | Age: 74
End: 2024-09-10
Payer: MEDICARE

## 2024-09-10 VITALS
OXYGEN SATURATION: 97 % | SYSTOLIC BLOOD PRESSURE: 134 MMHG | TEMPERATURE: 97.5 F | WEIGHT: 191 LBS | RESPIRATION RATE: 14 BRPM | BODY MASS INDEX: 28.62 KG/M2 | HEART RATE: 53 BPM | DIASTOLIC BLOOD PRESSURE: 80 MMHG

## 2024-09-10 DIAGNOSIS — R41.3 MEMORY DEFICIT: ICD-10-CM

## 2024-09-10 DIAGNOSIS — Z23 NEED FOR VACCINATION: ICD-10-CM

## 2024-09-10 DIAGNOSIS — R79.89 ELEVATED SERUM CREATININE: ICD-10-CM

## 2024-09-10 DIAGNOSIS — R41.89 COGNITIVE IMPAIRMENT: ICD-10-CM

## 2024-09-10 DIAGNOSIS — Z91.81 RISK FOR FALLS: ICD-10-CM

## 2024-09-10 DIAGNOSIS — R41.0 CONFUSION: ICD-10-CM

## 2024-09-10 DIAGNOSIS — D03.39: ICD-10-CM

## 2024-09-10 DIAGNOSIS — F10.920 ALCOHOLIC INTOXICATION WITHOUT COMPLICATION (H): Primary | ICD-10-CM

## 2024-09-10 LAB
ANION GAP SERPL CALCULATED.3IONS-SCNC: 8 MMOL/L (ref 7–15)
BUN SERPL-MCNC: 12.3 MG/DL (ref 8–23)
CALCIUM SERPL-MCNC: 9.8 MG/DL (ref 8.8–10.4)
CHLORIDE SERPL-SCNC: 104 MMOL/L (ref 98–107)
CREAT SERPL-MCNC: 1.05 MG/DL (ref 0.67–1.17)
EGFRCR SERPLBLD CKD-EPI 2021: 74 ML/MIN/1.73M2
GLUCOSE SERPL-MCNC: 109 MG/DL (ref 70–99)
HCO3 SERPL-SCNC: 30 MMOL/L (ref 22–29)
POTASSIUM SERPL-SCNC: 4.1 MMOL/L (ref 3.4–5.3)
SODIUM SERPL-SCNC: 142 MMOL/L (ref 135–145)
TSH SERPL DL<=0.005 MIU/L-ACNC: 2.06 UIU/ML (ref 0.3–4.2)
VIT B12 SERPL-MCNC: 465 PG/ML (ref 232–1245)

## 2024-09-10 PROCEDURE — 36415 COLL VENOUS BLD VENIPUNCTURE: CPT | Performed by: INTERNAL MEDICINE

## 2024-09-10 PROCEDURE — 99495 TRANSJ CARE MGMT MOD F2F 14D: CPT | Mod: 25 | Performed by: INTERNAL MEDICINE

## 2024-09-10 PROCEDURE — 90471 IMMUNIZATION ADMIN: CPT | Performed by: INTERNAL MEDICINE

## 2024-09-10 PROCEDURE — 90662 IIV NO PRSV INCREASED AG IM: CPT | Performed by: INTERNAL MEDICINE

## 2024-09-10 PROCEDURE — 82607 VITAMIN B-12: CPT | Performed by: INTERNAL MEDICINE

## 2024-09-10 PROCEDURE — 84443 ASSAY THYROID STIM HORMONE: CPT | Performed by: INTERNAL MEDICINE

## 2024-09-10 PROCEDURE — 80048 BASIC METABOLIC PNL TOTAL CA: CPT | Performed by: INTERNAL MEDICINE

## 2024-09-10 ASSESSMENT — PAIN SCALES - GENERAL: PAINLEVEL: NO PAIN (0)

## 2024-09-10 NOTE — LETTER
September 11, 2024    Vaughn Kitchen  [address] [place] [address]    Dear ,    We are writing to inform you of your test results.    All your labs are normal, there might be some highlighted which doesn't have any clinical significance.     Resulted Orders   Vitamin B12   Result Value Ref Range    Vitamin B12 465 232 - 1,245 pg/mL   TSH with free T4 reflex   Result Value Ref Range    TSH 2.06 0.30 - 4.20 uIU/mL   Basic metabolic panel  (Ca, Cl, CO2, Creat, Gluc, K, Na, BUN)   Result Value Ref Range    Sodium 142 135 - 145 mmol/L    Potassium 4.1 3.4 - 5.3 mmol/L    Chloride 104 98 - 107 mmol/L    Carbon Dioxide (CO2) 30 (H) 22 - 29 mmol/L    Anion Gap 8 7 - 15 mmol/L    Urea Nitrogen 12.3 8.0 - 23.0 mg/dL    Creatinine 1.05 0.67 - 1.17 mg/dL    GFR Estimate 74 >60 mL/min/1.73m2      Comment:      eGFR calculated using 2021 CKD-EPI equation.    Calcium 9.8 8.8 - 10.4 mg/dL      Comment:      Reference intervals for this test were updated on 7/16/2024 to reflect our healthy population more accurately. There may be differences in the flagging of prior results with similar values performed with this method. Those prior results can be interpreted in the context of the updated reference intervals.    Glucose 109 (H) 70 - 99 mg/dL       If you have any questions or concerns, please call the clinic at the number listed above.       Sincerely,    Susanna Acosta MD

## 2024-09-10 NOTE — PATIENT INSTRUCTIONS
As discussed, please do the work up needed today.     Placed referral to Addiction medicine as well as     Place DME cane orders which you can consider    Please keep up your appointment with neuropsychology as scheduled already     Take follow up appointment with Rafael Stuart MD     [facility] [phone]    =========================

## 2024-09-10 NOTE — PROGRESS NOTES
Assessment and Plan  1. Alcoholic intoxication without complication (H24)  2. Confusion    Recent hospitalization and discharge from 8/26 - 8/27 with Metabolic encephalopathy, Chronic left vertebral artery occlusion, MCI . CKD. All work up done including CMP was showing mild elevated creatinine at the time, will recheck at this time.  -Emphasized on quitting alcohol completely for which patient agreed for addiction medicine referral, went ahead and placed it accordingly.  -Patient has memory deficits as he endorses on the nursing staff will call him for hospital follow-up stating that he does not remember that he went to the hospital at all.  -Please see details below on the cognitive impairment.  - Adult Mental Health  Referral; Future  - Cane Order for DME - ONLY FOR DME    3. Cognitive impairment  4. Memory deficit  New problems cognitive impairment added to patient on list today again as he was already following neurology in the past.  Will check B12 and thyroid function and do further recommendations.  Continue to follow neurology as mentioned in the AVS below.  - Vitamin B12; Future  - TSH with free T4 reflex; Future  - Cane Order for DME - ONLY FOR DME    5. Risk for falls  Patient endorses that he only fell during the hospital visit but not after that.  Emphasized on using cane for avoiding any further falls which patient understood and agreed with all the risks and complications of not using it.  - Adult Mental Health  Referral; Future  - Cane Order for DME - ONLY FOR DME  - Occupational Therapy  Referral; Future    6. Lentigo maligna of nose (H)  Chronic stable, continue to follow dermatology recommendations and surveillance as managed by them.    7. Elevated serum creatinine  - Basic metabolic panel  (Ca, Cl, CO2, Creat, Gluc, K, Na, BUN); Future    8. Need for vaccination  - INFLUENZA HIGH DOSE, TRIVALENT, PF (FLUZONE)     ================      Six Item Cognitive Impairment  Test   (6CIT):    What year is it?                               Incorrect - 4 points  What month is it?                               Incorrect - 3 points    Give the patient an address to remember with five components:   [place] ( first and last name - 2 components)   [address]  (number and name of street - 2 components)   [place] ( city - 1 component)    About what time is it (within the hour)? Correct - 0 points  Count backwards from 20 to 1:   Correct - 0 points  Say the months of the year in reverse: Correct - 0 points  Repeat the address phrase:   2 errors - 4 points    Total 6CIT Score:      11/28    Interpretation: The 6CIT uses an inverse score and questions are weighted to produce a total out of 28. Scores of 0-7 are considered normal and 8 or more significant.    Advantages The test has high sensitivity without compromising specificity even in mild dementia. It is easy to translate linguistically and culturally.  Disadvantages The main disadvantage is in the scoring and weighting of the test, which is initially confusing, however computer models have simplified this greatly.    Probability Statistics: At the 7/8 cut off: Overall figures sensitivity 90% specificity 100%, in mild dementia sensitivity = 78% , specificity = 100%    Copyright 2000 [facility], [facility]. Courtesy of Dr. Abelardo Prakash    ========================    Over 60 minutes spent on reviewing patient chart,  face to face encounter, greater than 50% time spent with plan/cordination of care and documentation as above in my A/P.           Please note that this note consists of symbols derived from keyboarding, dictation and/or voice recognition software. As a result, there may be errors in the script that have gone undetected. Please consider this when interpreting information found in this chart.    Patient Instructions   As discussed, please do the work up needed today.     Placed referral to Addiction  medicine as well as     Place DME cane orders which you can consider    Please keep up your appointment with neuropsychology as scheduled already     Take follow up appointment with Rafael Stuart MD     [facility] [phone]    =========================  Return in about 36 weeks (around 5/20/2025), or if symptoms worsen or fail to improve, for Annual Wellness Exam.    Susanna Acosta MD  [facility] [place] is a 74 year old, presenting for the following health issues:  Hospital F/U        9/10/2024    10:08 AM   Additional Questions   [facility]        Hospital Follow-up Visit:    Hospital/Nursing Home/IP Rehab Facility: [facility]  Date of Admission: 8/26/24  Date of Discharge: 8/27/24  Reason(s) for Admission: Confusion, Alcoholic intoxication   Was the patient in the ICU or did the patient experience delirium during hospitalization?  No  Do you have any other stressors you would like to discuss with your provider? No    Problems taking medications regularly:  None  Medication changes since discharge: None  Problems adhering to non-medication therapy:  None    Summary of hospitalization:  [facility] discharge summary reviewed  Diagnostic Tests/Treatments reviewed.  Follow up needed: Neurology , PCP   Other Healthcare Providers Involved in Patient s Care:         Homecare  Update since discharge: improved.         Plan of care communicated with patient                  Allergies   Allergen Reactions    Similasan Hay Fever Relief [Cold-Eeze]     Seasonal Allergies Other (See Comments)     Rhinitis         Past Medical History:   Diagnosis Date    Hernia, abdominal     Malignant melanoma (H) 03/15/2022    Mumps     Skin cancer        Past Surgical History:   Procedure Laterality Date    BIOPSY  2012, 2015    Skin cancer    HERNIA REPAIR  1984    VASECTOMY          Family History   Problem Relation Age of Onset    Prostate Cancer Father     Colon Cancer Paternal Grandmother     Colon Cancer Other        Social History     Tobacco Use    Smoking status: Former     Current packs/day: 0.50     Average packs/day: 0.5 packs/day for 1 year (0.5 ttl pk-yrs)     Types: Cigarettes    Smokeless tobacco: Never   Substance Use Topics    Alcohol use: Yes     Comment: Occasional        Current Outpatient Medications   Medication Sig Dispense Refill    ACE/ARB/ARNI NOT PRESCRIBED (INTENTIONAL) Please choose reason not prescribed from choices below.       No current facility-administered medications for this visit.          Review of Systems  Constitutional, HEENT, cardiovascular, pulmonary, GI, , musculoskeletal, neuro, skin, endocrine and psych systems are negative, except as otherwise noted.      Objective    /80   Pulse 53   Temp 97.5  F (36.4  C) (Tympanic)   Resp 14   Wt 86.6 kg (191 lb)   SpO2 97%   BMI 28.62 kg/m    Body mass index is 28.62 kg/m .  Physical Exam   GENERAL: alert and no distress  NECK: no adenopathy, no asymmetry, masses, or scars  RESP: lungs clear to auscultation - no rales, rhonchi or wheezes  CV: regular rate and rhythm, normal S1 S2, no S3 or S4, no murmur, click or rub, no peripheral edema  ABDOMEN: soft, nontender, no hepatosplenomegaly, no masses and bowel sounds normal  MS: no gross musculoskeletal defects noted, no edema        Signed Electronically by: Susanna Acosta MD

## 2024-09-20 DIAGNOSIS — E78.5 DYSLIPIDEMIA: ICD-10-CM

## 2024-09-20 DIAGNOSIS — Z00.00 ENCOUNTER FOR MEDICARE ANNUAL WELLNESS EXAM: ICD-10-CM

## 2024-09-21 RX ORDER — ATORVASTATIN CALCIUM 40 MG/1
40 TABLET, FILM COATED ORAL DAILY
Qty: 90 TABLET | Refills: 0 | Status: SHIPPED | OUTPATIENT
Start: 2024-09-21

## 2024-09-21 NOTE — TELEPHONE ENCOUNTER
Refill done.    Please call pt and inquire regarding this >>   As per hospitalist note while pt was inpatient,he stated that he has not been taking his Statin and it was discontinued.     So, since when pt has not been taking Statin ? Unless there is some side effect of statin, which he did not update regarding this in his hospital follow up visit with me , he can resume back on his cholesterol medication.     Thank you,   Susanna Acosta MD on 9/21/2024 at 5:17 PM

## 2024-09-25 NOTE — TELEPHONE ENCOUNTER
Pt states he got an email from Dr. Acosta to stop taking the atorvastatin a few weeks ago.    RN educated that the clinic does not communicate with pt's via email as it is not secure. If we wanted to discuss medication with him we use [employer] (which he is not signed up for),by calling, or sending a letter in the mail. Pt was insistent that he received an email from Dr. Acosta a few weeks ago instructing him to stop taking atorvastatin.     RN reviewed chart. No indication or encounter that shows pt should stop atorvastatin. Pt wrote down instructions to continue atorvastatin, pt will talk to the pharmacy. Pt stated he didn't feel any different when he was on the statin. RN educated that he wouldn't feel different, that we can tell it works by the labs he has. Pt asked the same questions a few times over the course of the call.     Pt stated he will  the prescription and take it daily.      Desi Zuluaga RN

## 2024-10-02 ENCOUNTER — THERAPY VISIT (OUTPATIENT)
Dept: OCCUPATIONAL THERAPY | Facility: CLINIC | Age: 74
End: 2024-10-02
Attending: INTERNAL MEDICINE
Payer: MEDICARE

## 2024-10-02 DIAGNOSIS — R41.9 COGNITIVE COMPLAINTS: Primary | ICD-10-CM

## 2024-10-02 DIAGNOSIS — Z91.81 RISK FOR FALLS: ICD-10-CM

## 2024-10-02 PROCEDURE — 97165 OT EVAL LOW COMPLEX 30 MIN: CPT | Mod: GO

## 2024-10-02 PROCEDURE — 97535 SELF CARE MNGMENT TRAINING: CPT | Mod: GO

## 2024-10-02 NOTE — PROGRESS NOTES
OCCUPATIONAL THERAPY EVALUATION  Type of Visit: Evaluation             Subjective      Presenting condition or subjective complaint: alcohol  Date of onset: 09/10/24    Relevant medical history: Depression   Dates & types of surgery: hernia    Prior diagnostic imaging/testing results: MRI; CT scan; X-ray     Prior therapy history for the same diagnosis, illness or injury:        Occupational Profile: Patient is a 74 year old male who was referred to outpatient occupational therapy after recent hospitalization 8/26-8/27/24 due to alcohol intoxication. He was discharged home with family check in. He presented to session accompanied by his son, Edinson, who was present and contributed to history. Patient states things are going well at home and he and his son have no concerns with living independently or home safety. He had a hospital follow up visit with Dr. Susanna Acosta 9/10/24 who recommended referral for addiction medicine and a cane to prevent falls. When inquiring about use of cane, pt states he does not need a cane. He does complain about back pain, which fluccuates. He has been seen by neurology with Dr. Stuart for memory concerns on 8/16/23 with MoCA score of 22/30, underwent Cognitive Performance Testing 9/29/23 with a score of 5.4/5.6 (Level 5.0), and had a follow up with Dr. Stuart 3/2024 with a MoCA score of 24/30. He is scheduled for neuropsych testing 1/2025. Patient states he is safe caring for himself at home as long as he is not drinking and verbalizes intent to quit and will be seeing addition medicine      Prior Level of Function  Transfers: Independent  Ambulation: Independent  ADL: Independent  IADL: Driving, Finances, Housekeeping, Laundry, Meal preparation, Medication management    Living Environment  Social support: Alone   Type of home: Apartment/condo; Multi-level   Stairs to enter the home: No       Ramp: No   Stairs inside the home: Yes 11 Is there a railing: Yes     Help at  home: None  Equipment owned:       Employment: No    Hobbies/Interests:      Patient goals for therapy: not applicable    Pain assessment: Back pain comes and goes 5/10    No falls      Objective     Cognitive Status Examination  Orientation: Oriented to person, place and time   Level of Consciousness: Alert  Follows Commands and Answers Questions: 100% of the time  Personal Safety and Judgement: Intact  Memory:  diagnosed with MCI  He denies functional concerns related to memory including medication management, financial management, schedule management    Previous Screens:   8/16/23: MoCA 22/30  3/2024: MoCA 24/3  9/29/24: Cognitive Performance Test: 5.4/5.6, level 5.0    Neuropsych scheduled in January 2025    VISUAL SKILLS  Visual Acuity: Wears glasses  Visual Field: Appears normal  Visual Attention: Appears normal      SENSATION: UE Sensation WNL    POSTURE: Standing Posture: forwardly flexed due to back pain  RANGE OF MOTION: UE AROM WNL  STRENGTH: UE Strength WNL  MUSCLE TONE: WNL  COORDINATION: WNL  BALANCE:  pt denies falls; tolerance to standing/walking can be dependent on back pain    FUNCTIONAL MOBILITY  Assistive Device(s): None  Ambulation: Independent    BED MOBILITY: Independent    TRANSFERS: Independent    BATHING: Independent    UPPER BODY DRESSING: Independent    LOWER BODY DRESSING: Independent    TOILETING: Independent    GROOMING: Independent    EATING/SELF FEEDING: Independent     ACTIVITY TOLERANCE:     INSTRUMENTAL ACTIVITIES OF DAILY LIVING (IADL):   Meal Planning/Prep: Independent in meal planning and preparation. He eats consistent meals. He denies issues with forgetting steps or leaving stove/oven on    Home/Financial Management: Independent in managing household tasks; independent in financial management   Medications: Pt states he is not required to manage medications, however, takes them as an as needed basis  Communication/Computer Use: Independent   Community Mobility: No issues  driving   Care of Others: Care of his dog and walks daily  Leisure: enjoys his neighbors, golfing. walking    Assessment & Plan   CLINICAL IMPRESSIONS  Medical Diagnosis: Risk for falls (Z91.81)    Treatment Diagnosis: cogntive complaints    Impression/Assessment: Patient is a 74 year old male who was referred to outpatient occupational therapy after recent hospitalization 8/26-8/27/24 due to alcohol intoxication and to address functional independence and safety. The following significant findings have been identified:  known MCI .  These identified deficits interfere with their ability to perform recall of information as compared to previous level of function. Patient is independent in ADL/IADL's and no concerns with home safety and independence. Patient states he is safe caring for himself at home as long as he is not drinking and verbalizes intent to quit and will be seeing addition medicine. He is scheduled for neuropsych evaluation 1/2025. Discussed returning in event of change in functional status to continue to track changes in CPT performance and update recommendations for supervision/safety. No further outpatient occupational therapy indicated at this time    Clinical Decision Making (Complexity):  Assessment of Occupational Performance: 1-3 Performance Deficits  Occupational Performance Limitations: memory/recall  Clinical Decision Making (Complexity): Low complexity    PLAN OF CARE  Treatment Interventions:  Interventions: Self-Care/Home Management, Therapeutic Activity    Long Term Goals          Frequency of Treatment: 1 visit  Duration of Treatment: 1 visit     Recommended Referrals to Other Professionals:  none  Education Assessment: Learner/Method: Patient;Family;Listening     Risks and benefits of evaluation/treatment have been explained.   Patient/Family/caregiver agrees with Plan of Care.     Evaluation Time:      Signing Clinician: Jen Thurman, OTR/L, CNS, CSRS        [facility]  Rehabilitation Services                                                                                   OUTPATIENT OCCUPATIONAL THERAPY      PLAN OF TREATMENT FOR OUTPATIENT REHABILITATION   Patient's Last Name, First Name, Richard Higginbotham YOB: 1950   Provider's Name   [facility]   Medical Record No.  [id number]     Onset Date: 09/10/24 Start of Care Date: 10/02/24     Medical Diagnosis:  Risk for falls (Z91.81)      OT Treatment Diagnosis:  cogntive complaints Plan of Treatment  Frequency/Duration:1 visit/1 visit    Certification date from 10/02/24   To 10/02/24        See note for plan of treatment details and functional goals     Jen Thurman, OTR/L, CNS, CSRS                         I CERTIFY THE NEED FOR THESE SERVICES FURNISHED UNDER        THIS PLAN OF TREATMENT AND WHILE UNDER MY CARE     (Physician attestation of this document indicates review and certification of the therapy plan).              Referring Provider:  Susanna Acosta    Initial Assessment  See Epic Evaluation- 10/02/24

## 2024-10-08 ENCOUNTER — LAB (OUTPATIENT)
Dept: LAB | Facility: CLINIC | Age: 74
End: 2024-10-08
Payer: MEDICARE

## 2024-10-08 ENCOUNTER — OFFICE VISIT (OUTPATIENT)
Dept: ADDICTION MEDICINE | Facility: CLINIC | Age: 74
End: 2024-10-08
Attending: INTERNAL MEDICINE
Payer: MEDICARE

## 2024-10-08 VITALS — SYSTOLIC BLOOD PRESSURE: 129 MMHG | HEART RATE: 59 BPM | DIASTOLIC BLOOD PRESSURE: 85 MMHG

## 2024-10-08 DIAGNOSIS — G31.84 MILD COGNITIVE IMPAIRMENT: ICD-10-CM

## 2024-10-08 DIAGNOSIS — F10.11 ALCOHOL ABUSE, IN REMISSION: ICD-10-CM

## 2024-10-08 DIAGNOSIS — F10.10 ALCOHOL USE DISORDER, MILD, ABUSE: ICD-10-CM

## 2024-10-08 DIAGNOSIS — F10.10 ALCOHOL USE DISORDER, MILD, ABUSE: Primary | ICD-10-CM

## 2024-10-08 LAB
AMPHETAMINE QUAL URINE POCT: NEGATIVE
BARBITURATE QUAL URINE POCT: NEGATIVE
BENZODIAZEPINE QUAL URINE POCT: NEGATIVE
BUPRENORPHINE QUAL URINE POCT: NEGATIVE
COCAINE QUAL URINE POCT: NEGATIVE
CREATININE QUAL URINE POCT: NORMAL
INTERNAL QC QUAL URINE POCT: NORMAL
MDMA QUAL URINE POCT: NEGATIVE
METHADONE QUAL URINE POCT: NEGATIVE
METHAMPHETAMINE QUAL URINE POCT: NEGATIVE
OPIATE QUAL URINE POCT: NEGATIVE
OXYCODONE QUAL URINE POCT: NEGATIVE
PH QUAL URINE POCT: NORMAL
PHENCYCLIDINE QUAL URINE POCT: NEGATIVE
POCT KIT EXPIRATION DATE: NORMAL
POCT KIT LOT NUMBER: NORMAL
SPECIFIC GRAVITY POCT: 1.02
TEMPERATURE URINE POCT: NORMAL
THC QUAL URINE POCT: NEGATIVE

## 2024-10-08 PROCEDURE — G0480 DRUG TEST DEF 1-7 CLASSES: HCPCS | Mod: 90

## 2024-10-08 PROCEDURE — 36415 COLL VENOUS BLD VENIPUNCTURE: CPT

## 2024-10-08 PROCEDURE — 80307 DRUG TEST PRSMV CHEM ANLYZR: CPT | Mod: 90

## 2024-10-08 PROCEDURE — G2211 COMPLEX E/M VISIT ADD ON: HCPCS | Performed by: NURSE PRACTITIONER

## 2024-10-08 PROCEDURE — 99000 SPECIMEN HANDLING OFFICE-LAB: CPT

## 2024-10-08 PROCEDURE — 80305 DRUG TEST PRSMV DIR OPT OBS: CPT | Performed by: NURSE PRACTITIONER

## 2024-10-08 PROCEDURE — 99205 OFFICE O/P NEW HI 60 MIN: CPT | Performed by: NURSE PRACTITIONER

## 2024-10-08 NOTE — Clinical Note
Dr. Acosta, Thank you for the referral to Addiction Medicine. This patient will continue to follow with outpatient Addiction Medicine for the management of Alcohol Use Disorder. Please see attached A/P.  AYLIN Aceves CNP on 10/8/2024 at 3:04 PM

## 2024-10-09 LAB — ETHYL GLUCURONIDE UR QL SCN: NEGATIVE NG/ML

## 2024-10-11 LAB
LABORATORY REPORT: NORMAL
PETH INTERPRETATION: NORMAL
PLPETH BLD-MCNC: <10 NG/ML
POPETH BLD-MCNC: 16 NG/ML

## 2024-11-07 ENCOUNTER — OFFICE VISIT (OUTPATIENT)
Dept: ADDICTION MEDICINE | Facility: CLINIC | Age: 74
End: 2024-11-07
Payer: COMMERCIAL

## 2024-11-07 ENCOUNTER — LAB (OUTPATIENT)
Dept: LAB | Facility: CLINIC | Age: 74
End: 2024-11-07
Payer: COMMERCIAL

## 2024-11-07 VITALS — SYSTOLIC BLOOD PRESSURE: 123 MMHG | HEART RATE: 66 BPM | DIASTOLIC BLOOD PRESSURE: 82 MMHG

## 2024-11-07 DIAGNOSIS — F10.11 ALCOHOL ABUSE, IN REMISSION: ICD-10-CM

## 2024-11-07 DIAGNOSIS — F43.21 GRIEF: ICD-10-CM

## 2024-11-07 DIAGNOSIS — F10.10 ALCOHOL USE DISORDER, MILD, ABUSE: Primary | ICD-10-CM

## 2024-11-07 DIAGNOSIS — F10.10 ALCOHOL USE DISORDER, MILD, ABUSE: ICD-10-CM

## 2024-11-07 PROCEDURE — 36415 COLL VENOUS BLD VENIPUNCTURE: CPT

## 2024-11-07 PROCEDURE — G0480 DRUG TEST DEF 1-7 CLASSES: HCPCS | Mod: 90

## 2024-11-07 NOTE — PROGRESS NOTES
[facility] Addiction Medicine    A/P                                                    ASSESSMENT/PLAN  Diagnoses and all orders for this visit:  Alcohol use disorder, mild, abuse  -     Phosphatidylethanol (PEth), Whole Blood; Future  -     Adult Mental Health  Referral; Future  Grief  -     Adult Mental Health  Referral; Future  Alcohol abuse, in remission  -     Phosphatidylethanol (PEth), Whole Blood; Future    No orders of the defined types were placed in this encounter.      Nov 7, 2024  - Overall symptoms well controlled. No alcohol use since last visit. No cravings.   - pharmacotherapy not indicated at this time, consider Naltrexone as primary recommendation in the future if needed.   - would benefit from increased engagement with psychosocial interventions.   - Plan to start AA meetings, PRS to call with local meetings, coordination of care today.   - start individual therapy/grief counseling, referral placed.   - monitor use/cravings   - check peth today. Results reviewed from 10/8/24 (16 ng/mL)  - has appropriate follow up with Neurology who he is established with. Pt is also scheduled for neuro psych testing on 1/3/25.        Continued Complex Management  The longitudinal plan of care for Alcohol Use Disorder (AUD) was addressed during this visit. Due to the added complexity in care, I will continue to support Vaughn in the subsequent management and with ongoing continuity of care.      Last encounter A/P 10/8/24  - pt presents for initial visit referred by primary care provider Dr. Acosta for further eval and management of alcohol use. S/p hospitalization from 8/26 - 8/27 for metabolic encephalopathy thought to be due to alcohol intoxication. Found to have a chronic left vertebral artery occlusion, cognitive impairment mild, and CKD stage 2.       - Confirms no alcohol use since discharge from hospital, will check PEth today and ETG. Reviewed criteria met for alcohol  use disorder mild. Discussed psychosocial interventions and pharmacotherapy. Given no current desire to drink alcohol/no cravings, reasonable to start with psychosocial interventions. Pt is contemplative regarding mutual support groups and programmatic care. Would like to monitor symptoms and close follow up with outpatient Addiction Medicine at this time.   - Reviewed patient goal is abstinence.   - discussed programmatic care and mutual support groups - continue to assess readiness.   - consider pharmacotherapy at follow up if indicated. Briefly reviewed FDA approved medications for AUD including Naltrexone, Campral, and Antabuse. Would avoid Antabuse due to risk for hepatitis. Would avoid Campral as first line agent due to h/o CKD stage 2. Primary recommendation should medication be started would be Naltrexone.   - discussed treatment plan with patient and son per pt's request.   - has appropriate follow up with Neurology who he is established with. Pt is also scheduled for neuro psych testing on 1/3/25.       PDMP Review         Value Time User    State PDMP site checked  Yes 11/7/2024  9:22 AM Danica Chambers APRN CNP              RTC  Return in about 4 weeks (around 12/5/2024) for Follow up, with me, in person.      Counseled the patient on the importance of having a recovery program in addition to medication to manage recovery.  Components include avoiding isolating, having willingness to change, avoiding triggers and managing cravings. Encouraged having some type of sober network and practicing honesty with trusted support person(s). Encouraged other services such as counseling, 12 step or other self-help organizations.        SUBJECTIVE                                                      Visit performed In Person, face-to-face    HPI:  Richard Kitchen is a 74 year old male with history of chronic left vertebral artery occlusion, cognitive impairment mild, CKD stage 2, basal cell carcinoma and alcohol use  "disorder mild who presents for follow up.     LEANNE Hx:    Reports alcohol use starting at age 20 yo. Denies heavy alcohol use over his lifetime. However feels over the past couple years he has been drinking \"more than I should  have\" Attributes this to his wife passing away 3 years ago. He is living alone. Does endorse larger amounts than intended. He tried to reduced his alcohol intake from whiskey or gin to NA wine or NA beer. Has been largely successful in this. Hospitalized from 8/26 - 8/27/24, last alcohol use prior to hospitalization. Denies h/o seizures or DT's. Has not has alcohol withdrawal symptoms in the past. He is open to mutual support groups or programmatic care if needed. However, he would like to first follow with Addiction Medicine outpatient to see if symptoms can be well controlled without pharmacotherapy or programming. Has not taken medication in the past for AUD. His goal is abstinence from alcohol.      Substance Use History:   ALCOHOL - Yes,  reports daily alcohol use 2-4 drinks, hard alcohol. Per chart review, hospitalized August 2024 and GEM at time of admission was 0.25. Last alcohol use ~ 09/2024.   CANNABIS - Denies.   PRESCRIPTION STIMULANTS (includes Ritalin, Adderall, Vyvanse) - Denies  COCAINE/CRACK - Denies  METH/AMPHETAMINES (includes ecstasy, MDMA/julee) - Denies  OPIATES - Possibly prescribed in the past for surgeries, but pt doesn't recall  BENZODIAZEPINES (includes Ativan, Klonopin, Xanax) - Denies  KRATOM (mild opioid and stimulant effects) - Denies  KETAMINE - Denies  HALLUCINOGENS (includes DXM) - Denies  BEHAVIORAL (Gambling, Eating d/o, Compulsivity) - Denies  History of treatment - N/A  NICOTINE  Cigarettes: Denies  Chew/snus: Denies  Vaping: Denies  Past NRT/medication use: Denies        Previous withdrawal treatment episodes (e.g. detox): Denies  Previous LEANNE treatment programs: Denies  Hospitalizations or overdose: Yes, August of 2024.  Medical complications from " "substance use: cognitive impairment   IV Drug use?: Denies  Previous Medication for Addiction Tx: Denies  Longest period of full abstinence: 1.5 months   Activities that have previously supported abstinence: \"Will power, supportive family\"  Current Recovery Activities: \"Will power, supportive family\"       Psychiatric History (per patient report and problem list review)  Past diagnoses - Denies  Current or past psychiatrist: Denies  Current or past therapist:  Denies  Hospitalizations/TMS/ECT - Denies  Suicide Attempts - Denies  Medication trials - Denies    SOCIAL HISTORY:  Housing status: alone, son lives 10 minutes away. Drives around town, but son brings to medical appointments. Moved from [place], [place] 5 years ago. Wife passed away 3 years ago.  Does not see youngest daughter who lives in [place].   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   Relationship status: - wife passed away 3 years ago   Children: Son and daughter. 4 grandchildren   Legal concerns related to use: Denies  Contact information up to date? Yes      TODAY'S VISIT  HPI Nov 7, 2024  - reports prior to initial visit in clinic he did have 3-4 low alcohol content beers per month. This was after hospitalization. Denies alcohol use since last appointment. No alcohol cravings. He has a couple bottles of wine at home that he has on hand for when people come over, he does not find this to be triggering. No other alcohol in the house. His son and his family do not drink, this helps especially in social settings.   - does wish to start AA meetings, feels having increase community support will be helpful.   - acknowledges how losing his wife really impacted his life. She passed away from myocardial infarction 3 years ago. Continues to struggle with waves of grief. Denies feeling down/depressed, reports feeling intermittent \"sad\" Most days he does ok but the loss comes to mind daily. Open to therapy. Has not done therapy or " counseling in the past.   - walking with dog 2.5 miles every morning, kristal Fuentes   - has a walking friend as well   - he is able to complete ADL's. Appetite normal.     OBJECTIVE  PHYSICAL EXAM:  /82 (BP Location: Right arm, Patient Position: Sitting, Cuff Size: Adult Regular)   Pulse 66     Physical Exam  Constitutional:       General: He is not in acute distress.     Appearance: Normal appearance.   HENT:      Head: Normocephalic and atraumatic.   Eyes:      General: No scleral icterus.     Extraocular Movements: Extraocular movements intact.   Cardiovascular:      Rate and Rhythm: Normal rate.   Pulmonary:      Effort: Pulmonary effort is normal.   Neurological:      Mental Status: He is alert and oriented to person, place, and time.   Psychiatric:         Attention and Perception: Attention and perception normal.         Mood and Affect: Mood and affect normal. Mood is not anxious or depressed.         Speech: Speech normal.         Behavior: Behavior is cooperative.         Thought Content: Thought content normal.         Judgment: Judgment normal.         PHQ-9 Score:       2/2/2021    11:17 AM 3/15/2022     3:42 PM   PHQ   PHQ-9 Total Score  7   Q9: Thoughts of better off dead/self-harm past 2 weeks Not at all Not at all       KENTON-7 Score:      2/2/2021    11:17 AM   KENTON-7 SCORE   Total Score 0       LABS (may not contain today's labs)                                                      Today's lab data  No results found for any visits on 11/07/24.        HISTORY                                                    Problem list reviewed & adjusted, as indicated.  Patient Active Problem List   Diagnosis    Pure hypercholesterolemia    Sensorineural hearing loss (SNHL) of both ears    Malignant melanoma (H)    Bradycardia    Diminished hearing, bilateral    Renal insufficiency syndrome    Syncope    Elevated prostate specific antigen (PSA)    Stage 3a chronic kidney disease (H)    Need for  hepatitis C screening test    Grief reaction with prolonged bereavement    Memory difficulties    History of nonmelanoma skin cancer    Seborrheic keratoses    Cherry angioma    Lentigines    Multiple benign nevi    AK (actinic keratosis)    Thrombocytopenia (H)    Confusion    Alcoholic intoxication without complication (H)    History of memory loss         MEDICATION LIST (prior to visit)  Current Outpatient Medications   Medication Sig Dispense Refill    ACE/ARB/ARNI NOT PRESCRIBED (INTENTIONAL) Please choose reason not prescribed from choices below. (Patient not taking: Reported on 11/7/2024)      atorvastatin (LIPITOR) 40 MG tablet TAKE 1 TABLET BY MOUTH EVERY DAY (Patient not taking: Reported on 11/7/2024) 90 tablet 0     No current facility-administered medications for this visit.       MEDICATION LIST (after visit)  Current Outpatient Medications   Medication Sig Dispense Refill    ACE/ARB/ARNI NOT PRESCRIBED (INTENTIONAL) Please choose reason not prescribed from choices below. (Patient not taking: Reported on 11/7/2024)      atorvastatin (LIPITOR) 40 MG tablet TAKE 1 TABLET BY MOUTH EVERY DAY (Patient not taking: Reported on 11/7/2024) 90 tablet 0     No current facility-administered medications for this visit.         Allergies   Allergen Reactions    Similasan Hay Fever Relief [Cold-Eeze]     Seasonal Allergies Other (See Comments)     Rhinitis      I sent a total of 25 minutes today, on the care of this patient. This consisted of face-to-face time as well as time spent on pre-visit and post-visit activities including coordination of care, chart review, results review, and documentation.       AYLIN Aceves [facility] Addiction Medicine  [phone]

## 2024-11-07 NOTE — PROGRESS NOTES
[facility] - Addiction Medicine       Rooming information:    Point of care urine drug screen positive for:  Lab Results   Component Value Date    BUP Negative 10/08/2024    BZO Negative 10/08/2024    BAR Negative 10/08/2024    ANA Negative 10/08/2024    MAMP Negative 10/08/2024    AMP Negative 10/08/2024    MDMA Negative 10/08/2024    MTD Negative 10/08/2024    COE420 Negative 10/08/2024    OXY Negative 10/08/2024    PCP Negative 10/08/2024    THC Negative 10/08/2024    TEMP 94 F 10/08/2024    SGPOCT 1.025 10/08/2024       *POC urine drug screen does not screen for Fentanyl    PHQ-9 Scores:       2/2/2021    11:17 AM 3/15/2022     3:42 PM   PHQ   PHQ-9 Total Score  7   Q9: Thoughts of better off dead/self-harm past 2 weeks Not at all Not at all     KENTON-7 Scores:      2/2/2021    11:17 AM   KENTON-7 SCORE   Total Score 0       Any other recent substance use:    Denies    NICOTINE-No  If using nicotine, ready to quit? N/a    Side effects related to medications pt would like to discuss with provider (constipation, dry mouth, HA, GI upset, sedation?) N/A     Narcan currently available: N/A    Primary care provider: Susanna Acosta MD     Mental health provider: no (follow up on MH referral if needed)    Any housing, insurance deficits?: none    Contact information up to date? yes    3rd Party Involvement not today (please obtain SUZANNA if pt would like to include)      Wes Barney MA  November 7, 2024  9:14 AM

## 2024-11-08 ENCOUNTER — TELEPHONE (OUTPATIENT)
Dept: ADDICTION MEDICINE | Facility: CLINIC | Age: 74
End: 2024-11-08
Payer: MEDICARE

## 2024-11-08 NOTE — TELEPHONE ENCOUNTER
Meeting Information: The patient was informed about the upcoming Alcoholics Anonymous (AA) meeting scheduled as follows:    Location: [facility]  Address: [address], [place] [address]  Day and Time: Monday at 6:30 PM  Group Name: [facility]  Format: In-person  The peer  encouraged the patient to continue attending these meetings as they provide a supportive environment for individuals in recovery.    Follow-Up Actions: The peer  will follow up with the patient next week to monitor ongoing progress and address any challenges they may face. Additionally, resources for further support were provided to ensure the patient has access to necessary tools during his  recovery journey.

## 2024-11-10 LAB
LABORATORY REPORT: NORMAL
PETH INTERPRETATION: NORMAL
PLPETH BLD-MCNC: <10 NG/ML
POPETH BLD-MCNC: <10 NG/ML

## 2024-12-05 ENCOUNTER — OFFICE VISIT (OUTPATIENT)
Dept: ADDICTION MEDICINE | Facility: CLINIC | Age: 74
End: 2024-12-05
Payer: MEDICARE

## 2024-12-05 VITALS — HEART RATE: 54 BPM | SYSTOLIC BLOOD PRESSURE: 146 MMHG | DIASTOLIC BLOOD PRESSURE: 81 MMHG

## 2024-12-05 DIAGNOSIS — F43.21 GRIEF: ICD-10-CM

## 2024-12-05 DIAGNOSIS — F10.11 ALCOHOL USE DISORDER, MILD, IN EARLY REMISSION: Primary | ICD-10-CM

## 2024-12-05 ASSESSMENT — PAIN SCALES - GENERAL: PAINLEVEL_OUTOF10: NO PAIN (0)

## 2024-12-05 NOTE — PROGRESS NOTES
[facility] - Addiction Medicine       Rooming information:    Point of care urine drug screen positive for:  Lab Results   Component Value Date    BUP Negative 10/08/2024    BZO Negative 10/08/2024    BAR Negative 10/08/2024    ANA Negative 10/08/2024    MAMP Negative 10/08/2024    AMP Negative 10/08/2024    MDMA Negative 10/08/2024    MTD Negative 10/08/2024    SNJ116 Negative 10/08/2024    OXY Negative 10/08/2024    PCP Negative 10/08/2024    THC Negative 10/08/2024    TEMP 94 F 10/08/2024    SGPOCT 1.025 10/08/2024       *POC urine drug screen does not screen for Fentanyl    PHQ-9 Scores:       2/2/2021    11:17 AM 3/15/2022     3:42 PM   PHQ   PHQ-9 Total Score  7   Q9: Thoughts of better off dead/self-harm past 2 weeks Not at all Not at all     KENTON-7 Scores:      2/2/2021    11:17 AM   KENTON-7 SCORE   Total Score 0       Any other recent substance use:     Denies    NICOTINE-No  If using nicotine, ready to quit? N/a    Side effects related to medications pt would like to discuss with provider (constipation, dry mouth, HA, GI upset, sedation?) N/A     Narcan currently available: N/A    Primary care provider: Susanna Acosta MD     Mental health provider: no (follow up on MH referral if needed)    Any housing, insurance deficits?: none    Contact information up to date? yes    3rd Party Involvement not today (please obtain SUZANNA if pt would like to include)      Wes Barney MA  December 5, 2024  9:19 AM

## 2024-12-05 NOTE — PATIENT INSTRUCTIONS
Call [phone] to schedule individual therapy. You can specify desire to be in person and near your home.

## 2024-12-05 NOTE — PROGRESS NOTES
[facility] Addiction Medicine    A/P                                                    ASSESSMENT/PLAN  Diagnoses and all orders for this visit:  Alcohol use disorder, mild, in early remission  Grief    No orders of the defined types were placed in this encounter.      Dec 5, 2024  - AUD well controlled. Early remission. No recent alcohol use or cravings. Continue to monitor.   - PEth test results have decreased to not detectable levels (11/7/24 <10 ng/dL)   - discussed importance of ongoing recovery supports, Vaughn does not feel mutual support groups are needed at this time, such as AA. Strongly encouraged him to start individual therapy both for ongoing recovery support and as intervention for prolonged grief reaction.   - no pharmacotherapy needed at this time.   - additional patient counseling as below.   - will continue to follow up with patient periodically to monitor for above and continue to provide support/counseling.        Continued Complex Management  The longitudinal plan of care for Alcohol Use Disorder (AUD) was addressed during this visit. Due to the added complexity in care, I will continue to support Vaughn in the subsequent management and with ongoing continuity of care.    RTC  Return in about 3 months (around 3/5/2025) for Follow up, with me, in person.    Components include avoiding isolating, having willingness to change, avoiding triggers and managing cravings. Encouraged having some type of sober network and practicing honesty with trusted support person(s). Encouraged other services such as counseling, 12 step or other self-help organizations.        Last encounter A/P  Nov 7, 2024  - Overall symptoms well controlled. No alcohol use since last visit. No cravings.   - pharmacotherapy not indicated at this time, consider Naltrexone as primary recommendation in the future if needed.   - would benefit from increased engagement with psychosocial interventions.   - Plan to start AA  "meetings, PRS to call with local meetings, coordination of care today.   - start individual therapy/grief counseling, referral placed.   - monitor use/cravings   - check peth today. Results reviewed from 10/8/24 (16 ng/mL)  - has appropriate follow up with Neurology who he is established with. Pt is also scheduled for neuro psych testing on 1/3/25.       PDMP Review         Value Time User    State PDMP site checked  Yes 12/5/2024  9:22 AM Danica Chambers APRN CNP              SUBJECTIVE                                                      Visit performed In Person, face-to-face    HPI:  Richard Kitchen is a 74 year old male with history of chronic left vertebral artery occlusion, cognitive impairment mild, CKD stage 2, basal cell carcinoma and alcohol use disorder mild who presents for follow up.      LEANNE Hx:    Reports alcohol use starting at age 20 yo. Denies heavy alcohol use over his lifetime. However feels over the past couple years he has been drinking \"more than I should  have\" Attributes this to his wife passing away 3 years ago. He is living alone. Does endorse larger amounts than intended. He tried to reduced his alcohol intake from whiskey or gin to NA wine or NA beer. Has been largely successful in this. Hospitalized from 8/26 - 8/27/24, last alcohol use prior to hospitalization. Denies h/o seizures or DT's. Has not has alcohol withdrawal symptoms in the past. He is open to mutual support groups or programmatic care if needed. However, he would like to first follow with Addiction Medicine outpatient to see if symptoms can be well controlled without pharmacotherapy or programming. Has not taken medication in the past for AUD. His goal is abstinence from alcohol.      Substance Use History:   ALCOHOL - Yes,  reports daily alcohol use 2-4 drinks, hard alcohol. Per chart review, hospitalized August 2024 and GEM at time of admission was 0.25. Last alcohol use ~ 09/2024.   CANNABIS - Denies.   PRESCRIPTION " "STIMULANTS (includes Ritalin, Adderall, Vyvanse) - Denies  COCAINE/CRACK - Denies  METH/AMPHETAMINES (includes ecstasy, MDMA/julee) - Denies  OPIATES - Possibly prescribed in the past for surgeries, but pt doesn't recall  BENZODIAZEPINES (includes Ativan, Klonopin, Xanax) - Denies  KRATOM (mild opioid and stimulant effects) - Denies  KETAMINE - Denies  HALLUCINOGENS (includes DXM) - Denies  BEHAVIORAL (Gambling, Eating d/o, Compulsivity) - Denies  History of treatment - N/A  NICOTINE  Cigarettes: Denies  Chew/snus: Denies  Vaping: Denies  Past NRT/medication use: Denies        Previous withdrawal treatment episodes (e.g. detox): Denies  Previous LEANNE treatment programs: Denies  Hospitalizations or overdose: Yes, August of 2024.  Medical complications from substance use: cognitive impairment   IV Drug use?: Denies  Previous Medication for Addiction Tx: Denies  Longest period of full abstinence: 1.5 months   Activities that have previously supported abstinence: \"Will power, supportive family\"  Current Recovery Activities: \"Will power, supportive family\"        Psychiatric History (per patient report and problem list review)  Past diagnoses - Denies  Current or past psychiatrist: Denies  Current or past therapist:  Denies  Hospitalizations/TMS/ECT - Denies  Suicide Attempts - Denies  Medication trials - Denies     SOCIAL HISTORY:  Housing status: alone, son lives 10 minutes away. Drives around town, but son brings to medical appointments. Moved from [place], [place] 5 years ago. Wife passed away 3 years ago.  Does not see youngest daughter who lives in [place].   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   Relationship status: - wife passed away 3 years ago   Children: Son and daughter. 4 grandchildren   Legal concerns related to use: Denies  Contact information up to date? Yes    Recent HPI Details:  Nov 7, 2024  - reports prior to initial visit in clinic he did have 3-4 low alcohol content " "beers per month. This was after hospitalization. Denies alcohol use since last appointment. No alcohol cravings. He has a couple bottles of wine at home that he has on hand for when people come over, he does not find this to be triggering. No other alcohol in the house. His son and his family do not drink, this helps especially in social settings.   - does wish to start AA meetings, feels having increase community support will be helpful.   - acknowledges how losing his wife really impacted his life. She passed away from myocardial infarction 3 years ago. Continues to struggle with waves of grief. Denies feeling down/depressed, reports feeling intermittent \"sad\" Most days he does ok but the loss comes to mind daily. Open to therapy. Has not done therapy or counseling in the past.   - walking with dog 2.5 miles every morning, kristal Fuentes   - has a walking friend as well   - he is able to complete ADL's. Appetite normal.     TODAY'S VISIT  HPI Dec 5, 2024  - doing well, no concerns   - no alcohol use, no cravings   - not difficult not to drink   - strong family support, son and his family are nearby   - working to meet more friends   - thinking about wife daily, multiple times per day, has not yet scheduled therapy, does feel lonely at times   - many friends in his neighborhood he can go for walks with . Has a dog that keeps him active as well.   - son and his wife do not drink which has helped immensely. They get together and watch football and there are no environmental triggers to want to have a beer.   - when he gets together with his friends at a bar/restaurant he orders NA beer.   - not light headed or dizzy, no recent falls, no issues with balance or coordination       OBJECTIVE  PHYSICAL EXAM:  BP (!) 146/81 (BP Location: Right arm, Patient Position: Sitting, Cuff Size: Adult Regular)   Pulse 54     Physical Exam  Constitutional:       General: He is not in acute distress.     Appearance: Normal " appearance. He is not diaphoretic.   Eyes:      Extraocular Movements: Extraocular movements intact.   Neurological:      Mental Status: He is alert and oriented to person, place, and time.      Motor: Motor function is intact.      Gait: Gait is intact.   Psychiatric:         Attention and Perception: Attention and perception normal.         Mood and Affect: Mood is not anxious or depressed.         Speech: Speech normal.         Behavior: Behavior is cooperative.         Thought Content: Thought content normal.         Judgment: Judgment normal.           PHQ-9 Score:       2/2/2021    11:17 AM 3/15/2022     3:42 PM   PHQ   PHQ-9 Total Score  7   Q9: Thoughts of better off dead/self-harm past 2 weeks Not at all Not at all       KENTON-7 Score:      2/2/2021    11:17 AM   KENTON-7 SCORE   Total Score 0       LABS (may not contain today's labs)                                                      Today's lab data  No results found for any visits on 12/05/24.        HISTORY                                                    Problem list reviewed & adjusted, as indicated.  Patient Active Problem List   Diagnosis    Pure hypercholesterolemia    Sensorineural hearing loss (SNHL) of both ears    Malignant melanoma (H)    Bradycardia    Diminished hearing, bilateral    Renal insufficiency syndrome    Syncope    Elevated prostate specific antigen (PSA)    Stage 3a chronic kidney disease (H)    Need for hepatitis C screening test    Grief reaction with prolonged bereavement    Memory difficulties    History of nonmelanoma skin cancer    Seborrheic keratoses    Cherry angioma    Lentigines    Multiple benign nevi    AK (actinic keratosis)    Thrombocytopenia (H)    Confusion    Alcoholic intoxication without complication (H)    History of memory loss         MEDICATION LIST (prior to visit)  Current Outpatient Medications   Medication Sig Dispense Refill    ACE/ARB/ARNI NOT PRESCRIBED (INTENTIONAL) Please choose reason not  prescribed from choices below. (Patient not taking: Reported on 11/7/2024)      atorvastatin (LIPITOR) 40 MG tablet TAKE 1 TABLET BY MOUTH EVERY DAY (Patient not taking: Reported on 11/7/2024) 90 tablet 0     No current facility-administered medications for this visit.       MEDICATION LIST (after visit)  Current Outpatient Medications   Medication Sig Dispense Refill    ACE/ARB/ARNI NOT PRESCRIBED (INTENTIONAL) Please choose reason not prescribed from choices below. (Patient not taking: Reported on 11/7/2024)      atorvastatin (LIPITOR) 40 MG tablet TAKE 1 TABLET BY MOUTH EVERY DAY (Patient not taking: Reported on 11/7/2024) 90 tablet 0     No current facility-administered medications for this visit.         Allergies   Allergen Reactions    Similasan Hay Fever Relief [Cold-Eeze]     Seasonal Allergies Other (See Comments)     Rhinitis      I sent a total of 23 minutes today, on the care of this patient. This consisted of face-to-face time as well as time spent on pre-visit and post-visit activities including chart review, results review, and documentation.         AYLIN Aceves [facility] Addiction Medicine  [phone]

## 2024-12-12 ENCOUNTER — OFFICE VISIT (OUTPATIENT)
Dept: DERMATOLOGY | Facility: CLINIC | Age: 74
End: 2024-12-12
Payer: COMMERCIAL

## 2024-12-12 DIAGNOSIS — Z85.828 HISTORY OF NONMELANOMA SKIN CANCER: Primary | ICD-10-CM

## 2024-12-12 DIAGNOSIS — L82.1 SK (SEBORRHEIC KERATOSIS): ICD-10-CM

## 2024-12-12 DIAGNOSIS — L81.4 LENTIGINES: ICD-10-CM

## 2024-12-12 DIAGNOSIS — D18.01 CHERRY ANGIOMA: ICD-10-CM

## 2024-12-12 DIAGNOSIS — D49.2 NEOPLASM OF SKIN: ICD-10-CM

## 2024-12-12 DIAGNOSIS — D22.9 MULTIPLE BENIGN NEVI: ICD-10-CM

## 2024-12-12 DIAGNOSIS — L57.0 AK (ACTINIC KERATOSIS): ICD-10-CM

## 2024-12-12 NOTE — LETTER
12/12/2024      Richard Kitchen  [id number] [place] [address]      Dear Colleague,    Thank you for referring your patient, Richard Kitchen, to the [facility]. Please see a copy of my visit note below.    [facility] Dermatology Note  Encounter Date: Dec 12, 2024  Office Visit      Dermatology Problem List:  FBSE: 12/12/24    # NUB L medial calf, S/p Biopsy performed on 12/12/24, pending results.   1. 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 Hx of NMSC  - BCC, Left Mid back s/p ED& C 1/18/24  - BCC, L scapular back, S/p ED&C 1/18/24  - BCC, L jawline, s/p Mohs and linear repair 9/18/23  - Several Mohs in history  3. AK, S/p Cryo   - AK, bx proven - L lateral thigh - cryo 1/18/24  - HAK - bx proven 6/20/24 - cryo 7/15/24  ____________________________________________    # Neoplasm of unspecified behavior of the skin (D49.2) on the L medial calf. The differential diagnosis includes NMSC vs other. .   - Shave biopsy performed today, see procedure note below.  - Photographed today     #AK x 4 - see sites below  - Cryotherapy performed today, see procedure note below.    # Hx of Melanoma  - ABCDEs: Counseled ABCDEs of melanoma: Asymmetry, Border (irregularity), Color (not uniform, changes in color), Diameter (greater than 6 mm which is about the size of a pencil eraser), and Evolving (any changes in preexisting moles).  - Sun protection: Counseled SPF30+ sunscreen, UPF clothing, sun avoidance, tanning bed avoidance.   - Recommended yearly dental, ophthalmology,  - Recommended skin exams for all first-degree relatives.  - Recommended follow up is 6 months    # Hx of NMSC  - ABCDEs: Counseled ABCDEs of melanoma: Asymmetry, Border (irregularity), Color (not uniform, changes in color), Diameter (greater than 6 mm which is about the size of a pencil eraser), and Evolving (any changes in preexisting moles).  -  Sun protection: Counseled SPF30+ sunscreen, UPF clothing, sun avoidance, tanning bed avoidance.  - Recommended regular skin checks.      # Benign findings: multiple benign nevi, lentigines, cherry angiomas, SKs  - edu on benign etiology  - Signs and Symptoms of non-melanoma skin cancer and ABCDEs of melanoma reviewed with patient. Patient encouraged to perform monthly self skin exams and educated on how to perform them. UV precautions reviewed with patient. Patient was asked about new or changing moles/lesions on body.   - Sunscreen: Apply 20 minutes prior to going outdoors and reapply every two hours, when wet or sweating. We recommend using an SPF 30 or higher, and to use one that is water resistant.     - RTC for changes    Procedures Performed:   - Shave biopsy procedure note, location(s): see above. After discussion of benefits and risks including but not limited to bleeding, infection, scar, incomplete removal, recurrence, and non-diagnostic biopsy, written consent and photographs were obtained. The area was cleaned with isopropyl alcohol. 0.5mL of 1% lidocaine with epinephrine was injected to obtain adequate anesthesia of lesion(s). Shave biopsy at site(s) performed. Hemostasis was achieved with aluminium chloride. Petrolatum ointment and a sterile dressing were applied. The patient tolerated the procedure and no complications were noted. The patient was provided with verbal and written post care instructions.     CRYOTHERAPY PROCEDURE NOTE: 4 lesions in the above locations were treated with liquid nitrogen utilizing a 5-10sec thaw time. Patient was advised that the treated areas will become red, swollen, may develop a blister and then should crust and peal off in the next 1-2 weeks. Post-procedure instructions were provided.    Follow-up: 1 year, sooner pending path results    Staff and scribe:    Scribe Disclosure:   QUIANA MCMULLEN, am serving as a scribe; to document services personally performed by  Annalee Spence PA-C -based on data collection and the provider's statements to me.     Provider Disclosure:  I agree with above History, Review of Systems, Physical exam and Plan.  I have reviewed the content of the documentation and have edited it as needed. I have personally performed the services documented here and the documentation accurately represents those services and the decisions I have made.      Electronically signed by:    All risks, benefits and alternatives were discussed with patient.  Patient is in agreement and understands the assessment and plan.  All questions were answered.    Annalee Spence PA-C, MPAS  [facility] [place]: Phone: [phone], Fax: [phone]  [facility]: Phone: [phone],  Fax: [phone]  [facility]: Phone: [phone], Fax: [phone]  ____________________________________________    CC: Skin Check (FBSC)      Reviewed patients past medical history and pertinent chart review prior to patient's visit today.     HPI:  Mr. Richard Kitchen is a 74 year old male who presents today as a return patient for FBSC. Last Seen on 7/15/24 where he had a biopsy proven HAK treated with cryo. Last FBSE was 6mo ago.     Today patient did not report any spots of concern.     Has a hx of NMSC and melanoma.     Patient is otherwise feeling well, without additional concerns.    Labs:  N/A    Physical Exam:  Vitals: There were no vitals taken for this visit.  SKIN: Full skin excluding the undergarment areas was performed. The exam included the head/face, neck, both arms, chest, back, abdomen, both legs, buttocks, digits and/or nails.    - Daugherty's skin type II, has <100 nevi  - There are dome shaped bright red papules on the trunk.   - Multiple regular brown pigmented macules and papules are identified on the trunk and extremities.   - Scattered brown macules on sun exposed  areas.  - There are waxy stuck on tan to brown papules on the trunk.   - There is/are erythematous macules with overlying adherent scale on the: scalp x 3, x 1 L shin   - L medial calf there is a 7 mm pink papule with ulceration and shiny white lines.   - No other lesions of concern on areas examined.             Medications:  Current Outpatient Medications   Medication Sig Dispense Refill     ACE/ARB/ARNI NOT PRESCRIBED (INTENTIONAL) Please choose reason not prescribed from choices below.       atorvastatin (LIPITOR) 40 MG tablet Take 1 tablet (40 mg) by mouth daily 90 tablet 0     No current facility-administered medications for this visit.      Past Medical/Surgical History:   Patient Active Problem List   Diagnosis     Pure hypercholesterolemia     Sensorineural hearing loss (SNHL) of both ears     Malignant melanoma (H)     Bradycardia     Diminished hearing, bilateral     Renal insufficiency syndrome     Syncope     Elevated prostate specific antigen (PSA)     Stage 3a chronic kidney disease (H)     Need for hepatitis C screening test     Grief reaction with prolonged bereavement     Memory difficulties     History of nonmelanoma skin cancer     Seborrheic keratoses     Cherry angioma     Lentigines     Multiple benign nevi     AK (actinic keratosis)     Thrombocytopenia (H24)     Past Medical History:   Diagnosis Date     Hernia, abdominal      Malignant melanoma (H)      Malignant melanoma (H) 03/15/2022     Mumps      Skin cancer                                    Again, thank you for allowing me to participate in the care of your patient.        Sincerely,        Annalee Spence PA-C

## 2024-12-12 NOTE — PATIENT INSTRUCTIONS
Wound Care After a Biopsy    What is a skin biopsy?  A skin biopsy allows the doctor to examine a very small piece of tissue under the microscope to determine the diagnosis and the best treatment for the skin condition. A local anesthetic (numbing medicine)  is injected with a very small needle into the skin area to be tested. A small piece of skin is taken from the area. Sometimes a suture (stitch) is used.     What are the risks of a skin biopsy?  I will experience scar, bleeding, swelling, pain, crusting and redness. I may experience incomplete removal or recurrence. Risks of this procedure are excessive bleeding, bruising, infection, nerve damage, numbness, thick (hypertrophic or keloidal) scar and non-diagnostic biopsy.    How should I care for my wound for the first 24 hours?  Keep the wound dry and covered for 24 hours  If it bleeds, hold direct pressure on the area for 15 minutes. If bleeding does not stop then go to the emergency room  Avoid strenuous exercise the first 1-2 days or as your doctor instructs you    How should I care for the wound after 24 hours?  After 24 hours, remove the bandage  You may bathe or shower as normal  If you had a scalp biopsy, you can shampoo as usual and can use shower water to clean the biopsy site daily  Clean the wound twice a day with gentle soap and water  Do not scrub, be gentle  Apply white petroleum/Vaseline after cleaning the wound with a cotton swab or a clean finger, and keep the site covered with a Bandaid /bandage. Bandages are not necessary with a scalp biopsy  If you are unable to cover the site with a Bandaid /bandage, re-apply ointment 2-3 times a day to keep the site moist. Moisture will help with healing  Avoid strenuous activity for first 1-2 days  Avoid lakes, rivers, pools, and oceans until the stitches are removed or the site is healed    How do I clean my wound?  Wash hands thoroughly with soap or use hand  before all wound care  Clean  the wound with gentle soap and water  Apply white petroleum/Vaseline  to wound after it is clean  Replace the Bandaid /bandage to keep the wound covered for the first few days or as instructed by your doctor  If you had a scalp biopsy, warm shower water to the area on a daily basis should suffice    What should I use to clean my wound?   Cotton-tipped applicators (Qtips )  White petroleum jelly (Vaseline ). Use a clean new container and use Q-tips to apply.  Bandaids   as needed  Gentle soap     How should I care for my wound long term?  Do not get your wound dirty  Keep up with wound care for one week or until the area is healed.  A small scab will form and fall off by itself when the area is completely healed. The area will be red and will become pink in color as it heals. Sun protection is very important for how your scar will turn out. Sunscreen with an SPF 30 or greater is recommended once the area is healed.  If you have stitches, stitches need to be removed in 10 days. You may return to our clinic for this or you may have it done locally at your doctor s office.  You should have some soreness but it should be mild and slowly go away over several days. Talk to your doctor about using tylenol for pain,    When should I call my doctor?  If you have increased:   Pain or swelling  Pus or drainage (clear or slightly yellow drainage is ok)  Temperature over 100F  Spreading redness or warmth around wound    When will I hear about my results?  The biopsy results can take 2-3 weeks to come back. The clinic will call you with the results, send you a [facility] message, or have you schedule a follow-up clinic or phone time to discuss the results. Contact our clinics if you do not hear from us in 3 weeks.     Who should I call with questions?  [facility]: [phone]   [facility]: [phone]  For urgent needs outside of business hours call the [facility] at [phone] and ask for the dermatology resident on call      Cryotherapy    What is it?  Use of a very cold liquid, such as liquid nitrogen, to freeze and destroy abnormal skin cells that need to be removed    What should I expect?  Tenderness and redness  A small blister that might grow and fill with dark purple blood. There may be crusting.  More than one treatment may be needed if the lesions do not go away.    How do I care for the treated area?  Gently wash the area with your hands when bathing.  Use a thin layer of Vaseline to help with healing. You may use a Band-Aid.   The area should heal within 7-10 days and may leave behind a pink or lighter color.   Do not use an antibiotic or Neosporin ointment.   You may take acetaminophen (Tylenol) for pain.     Call your Doctor if you have:  Severe pain  Signs of infection (warmth, redness, cloudy yellow drainage, and or a bad smell)  Questions or concerns    Who should I call with questions?      [facility]: [phone]      [facility]: [phone]      For urgent needs outside of business hours call the [facility] at [phone]        and ask for the dermatology resident on call      Proper skin care from [facility]:    -Eliminate harsh soaps as they strip the natural oils from the skin, often resulting in dry itchy skin ( i.e. Dial, Zest, [place])  -Use mild soaps such as Cetaphil or Dove Sensitive Skin in the shower. You do not need to use soap on arms, legs, and trunk every time you shower unless visibly soiled.   -Avoid hot or cold showers.  -After showering, lightly dry off and apply moisturizing within 2-3 minutes. This will help trap moisture in the skin.   -Aggressive use of a moisturizer at least 1-2 times a day to the entire body (including -Vanicream, Cetaphil, Aquaphor or Cerave) and moisturize hands after every washing.  -We recommend using  moisturizers that come in a tub that needs to be scooped out, not a pump. This has more of an oil base. It will hold moisture in your skin much better than a water base moisturizer. The above recommended are non-pore clogging.      Wear a sunscreen with at least SPF 30 on your face, ears, neck and V of the chest daily. Wear sunscreen on other areas of the body if those areas are exposed to the sun throughout the day. Sunscreens can contain physical and/or chemical blockers. Physical blockers are less likely to clog pores, these include zinc oxide and titanium dioxide. Reapply every two hour and after swimming.     Sunscreen examples: https://www.ewg.org/sunscreen/    UV radiation  UVA radiation remains constant throughout the day and throughout the year. It is a longer wavelength than UVB and therefore penetrates deeper into the skin leading to immediate and delayed tanning, photoaging, and skin cancer. 70-80% of UVA and UVB radiation occurs between the hours of 10am-2pm.  UVB radiation  UVB radiation causes the most harmful effects and is more significant during the summer months. However, snow and ice can reflect UVB radiation leading to skin damage during the winter months as well. UVB radiation is responsible for tanning, burning, inflammation, delayed erythema (pinkness), pigmentation (brown spots), and skin cancer.     I recommend self monthly full body exams and yearly full body exams with a dermatology provider. If you develop a new or changing lesion please follow up for examination. Most skin cancers are pink and scaly or pink and pearly. However, we do see blue/brown/black skin cancers.  Consider the ABCDEs of melanoma when giving yourself your monthly full body exam ( don't forget the groin, buttocks, feet, toes, etc). A-asymmetry, B-borders, C-color, D-diameter, E-elevation or evolving. If you see any of these changes please follow up in clinic. If you cannot see your back I recommend purchasing a hand  held mirror to use with a larger wall mirror.       Checking for Skin Cancer  You can find cancer early by checking your skin each month. There are 3 kinds of skin cancer. They are melanoma, basal cell carcinoma, and squamous cell carcinoma. Doing monthly skin checks is the best way to find new marks or skin changes. Follow the instructions below for checking your skin.   The ABCDEs of checking moles for melanoma   Check your moles or growths for signs of melanoma using ABCDE:   Asymmetry: the sides of the mole or growth don t match  Border: the edges are ragged, notched, or blurred  Color: the color within the mole or growth varies  Diameter: the mole or growth is larger than 6 mm (size of a pencil eraser)  Evolving: the size, shape, or color of the mole or growth is changing (evolving is not shown in the images below)    Checking for other types of skin cancer  Basal cell carcinoma or squamous cell carcinoma have symptoms such as:     A spot or mole that looks different from all other marks on your skin  Changes in how an area feels, such as itching, tenderness, or pain  Changes in the skin's surface, such as oozing, bleeding, or scaliness  A sore that does not heal  New swelling or redness beyond the border of a mole    Who s at risk?  Anyone can get skin cancer. But you are at greater risk if you have:   Fair skin, light-colored hair, or light-colored eyes  Many moles or abnormal moles on your skin  A history of sunburns from sunlight or tanning beds  A family history of skin cancer  A history of exposure to radiation or chemicals  A weakened immune system  If you have had skin cancer in the past, you are at risk for recurring skin cancer.   How to check your skin  Do your monthly skin checkups in front of a full-length mirror. Check all parts of your body, including your:   Head (ears, face, neck, and scalp)  Torso (front, back, and sides)  Arms (tops, undersides, upper, and lower armpits)  Hands (palms, backs,  and fingers, including under the nails)  Buttocks and genitals  Legs (front, back, and sides)  Feet (tops, soles, toes, including under the nails, and between toes)  If you have a lot of moles, take digital photos of them each month. Make sure to take photos both up close and from a distance. These can help you see if any moles change over time.   Most skin changes are not cancer. But if you see any changes in your skin, call your doctor right away. Only he or she can diagnose a problem. If you have skin cancer, seeing your doctor can be the first step toward getting the treatment that could save your life.   doo last reviewed this educational content on 4/1/2019 2000-2020 [employer] [address] [place] [address], [place] [address]. All rights reserved. This information is not intended as a substitute for professional medical care. Always follow your healthcare professional's instructions.       When should I call my doctor?  If you are worsening or not improving, please, contact us or seek urgent care as noted below.     Who should I call with questions (adults)?    [facility] [phone]  For urgent needs outside of business hours call the [facility] at [phone] and ask for the dermatology resident on call to be paged  If this is a medical emergency and you are unable to reach an ER, Call [phone]      If you need a prescription refill, please contact your pharmacy. Refills are approved or denied by our Physicians during normal business hours, Monday through Fridays  Per office policy, refills will not be granted if you have not been seen within the past year (or sooner depending on your child's condition)

## 2024-12-12 NOTE — PROGRESS NOTES
[facility] Dermatology Note  Encounter Date: Dec 12, 2024  Office Visit      Dermatology Problem List:  FBSE: 12/12/24    # NUB L medial calf, S/p Biopsy performed on 12/12/24, pending results.   1. 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 Hx of NMSC  - BCC, Left Mid back s/p ED& C 1/18/24  - BCC, L scapular back, S/p ED&C 1/18/24  - BCC, L jawline, s/p Mohs and linear repair 9/18/23  - Several Mohs in history  3. AK, S/p Cryo   - AK, bx proven - L lateral thigh - cryo 1/18/24  - HAK - bx proven 6/20/24 - cryo 7/15/24  ____________________________________________    # Neoplasm of unspecified behavior of the skin (D49.2) on the L medial calf. The differential diagnosis includes NMSC vs other. .   - Shave biopsy performed today, see procedure note below.  - Photographed today     #AK x 4 - see sites below  - Cryotherapy performed today, see procedure note below.    # Hx of Melanoma  - ABCDEs: Counseled ABCDEs of melanoma: Asymmetry, Border (irregularity), Color (not uniform, changes in color), Diameter (greater than 6 mm which is about the size of a pencil eraser), and Evolving (any changes in preexisting moles).  - Sun protection: Counseled SPF30+ sunscreen, UPF clothing, sun avoidance, tanning bed avoidance.   - Recommended yearly dental, ophthalmology,  - Recommended skin exams for all first-degree relatives.  - Recommended follow up is 6 months    # Hx of NMSC  - ABCDEs: Counseled ABCDEs of melanoma: Asymmetry, Border (irregularity), Color (not uniform, changes in color), Diameter (greater than 6 mm which is about the size of a pencil eraser), and Evolving (any changes in preexisting moles).  - Sun protection: Counseled SPF30+ sunscreen, UPF clothing, sun avoidance, tanning bed avoidance.  - Recommended regular skin checks.      # Benign findings: multiple benign nevi, lentigines, cherry angiomas, SKs  - edu on benign etiology  -  Signs and Symptoms of non-melanoma skin cancer and ABCDEs of melanoma reviewed with patient. Patient encouraged to perform monthly self skin exams and educated on how to perform them. UV precautions reviewed with patient. Patient was asked about new or changing moles/lesions on body.   - Sunscreen: Apply 20 minutes prior to going outdoors and reapply every two hours, when wet or sweating. We recommend using an SPF 30 or higher, and to use one that is water resistant.     - RTC for changes    Procedures Performed:   - Shave biopsy procedure note, location(s): see above. After discussion of benefits and risks including but not limited to bleeding, infection, scar, incomplete removal, recurrence, and non-diagnostic biopsy, written consent and photographs were obtained. The area was cleaned with isopropyl alcohol. 0.5mL of 1% lidocaine with epinephrine was injected to obtain adequate anesthesia of lesion(s). Shave biopsy at site(s) performed. Hemostasis was achieved with aluminium chloride. Petrolatum ointment and a sterile dressing were applied. The patient tolerated the procedure and no complications were noted. The patient was provided with verbal and written post care instructions.     CRYOTHERAPY PROCEDURE NOTE: 4 lesions in the above locations were treated with liquid nitrogen utilizing a 5-10sec thaw time. Patient was advised that the treated areas will become red, swollen, may develop a blister and then should crust and peal off in the next 1-2 weeks. Post-procedure instructions were provided.    Follow-up: 1 year, sooner pending path results    Staff and scribe:    Scribe Disclosure:   I, QUIANA ALFRED, am serving as a scribe; to document services personally performed by Annalee Spence PA-C -based on data collection and the provider's statements to me.     Provider Disclosure:  I agree with above History, Review of Systems, Physical exam and Plan.  I have reviewed the content of the documentation and have  edited it as needed. I have personally performed the services documented here and the documentation accurately represents those services and the decisions I have made.      Electronically signed by:    All risks, benefits and alternatives were discussed with patient.  Patient is in agreement and understands the assessment and plan.  All questions were answered.    Annalee Spence PA-C, MPAS  [facility] [place]: Phone: [phone], Fax: [phone]  [facility]: Phone: [phone],  Fax: [phone]  [facility]: Phone: [phone], Fax: [phone]  ____________________________________________    CC: Skin Check (FBSC)      Reviewed patients past medical history and pertinent chart review prior to patient's visit today.     HPI:  Mr. Richard Kitchen is a 74 year old male who presents today as a return patient for FBSC. Last Seen on 7/15/24 where he had a biopsy proven HAK treated with cryo. Last FBSE was 6mo ago.     Today patient did not report any spots of concern.     Has a hx of NMSC and melanoma.     Patient is otherwise feeling well, without additional concerns.    Labs:  N/A    Physical Exam:  Vitals: There were no vitals taken for this visit.  SKIN: Full skin excluding the undergarment areas was performed. The exam included the head/face, neck, both arms, chest, back, abdomen, both legs, buttocks, digits and/or nails.    - Daugherty's skin type II, has <100 nevi  - There are dome shaped bright red papules on the trunk.   - Multiple regular brown pigmented macules and papules are identified on the trunk and extremities.   - Scattered brown macules on sun exposed areas.  - There are waxy stuck on tan to brown papules on the trunk.   - There is/are erythematous macules with overlying adherent scale on the: scalp x 3, x 1 L shin   - L medial calf there is a 7 mm pink papule with ulceration and shiny white  lines.   - No other lesions of concern on areas examined.             Medications:  Current Outpatient Medications   Medication Sig Dispense Refill    ACE/ARB/ARNI NOT PRESCRIBED (INTENTIONAL) Please choose reason not prescribed from choices below.      atorvastatin (LIPITOR) 40 MG tablet Take 1 tablet (40 mg) by mouth daily 90 tablet 0     No current facility-administered medications for this visit.      Past Medical/Surgical History:   Patient Active Problem List   Diagnosis    Pure hypercholesterolemia    Sensorineural hearing loss (SNHL) of both ears    Malignant melanoma (H)    Bradycardia    Diminished hearing, bilateral    Renal insufficiency syndrome    Syncope    Elevated prostate specific antigen (PSA)    Stage 3a chronic kidney disease (H)    Need for hepatitis C screening test    Grief reaction with prolonged bereavement    Memory difficulties    History of nonmelanoma skin cancer    Seborrheic keratoses    Cherry angioma    Lentigines    Multiple benign nevi    AK (actinic keratosis)    Thrombocytopenia (H24)     Past Medical History:   Diagnosis Date    Hernia, abdominal     Malignant melanoma (H)     Malignant melanoma (H) 03/15/2022    Mumps     Skin cancer

## 2024-12-19 ENCOUNTER — TELEPHONE (OUTPATIENT)
Dept: DERMATOLOGY | Facility: CLINIC | Age: 74
End: 2024-12-19
Payer: MEDICARE

## 2024-12-19 NOTE — TELEPHONE ENCOUNTER
----- Message from Annalee Spence sent at 12/18/2024  6:20 PM CST -----  No my chart    Please call patient and let him/her know that site on his/her calf which was biopsied at the last visit was consistent with a BCC. It was superficial/nodular so there are two treatment options:    1) ED&C - 95% cure rate, likely a pink scar slightly bigger than the site itself  2) a cream - imiquimod applied M-F x 6 weeks, 80-90% cure rate, less likely to scar  3) WLE - will have linear scar, sutures - will need to see derm surg in this case    Please schedule with me for an ED&C if that is what the patient would like to do, otherwise let me know if they want to treat it with imiquimod - and if so I will send the Rx, but he/she will need a follow-up to recheck the site in 2mo following discontinuation of the treatment.     Thanks!

## 2024-12-19 NOTE — TELEPHONE ENCOUNTER
Patient Contact    Attempt #1    Was call answered? No    Left a voicemail asking patient to give us a call back at our main dermatology line at [phone]    Tashia BOATENG RN BSN  [facility]  [phone]

## 2024-12-20 NOTE — TELEPHONE ENCOUNTER
Patient Contact    Attempt # 2    Was call answered?  No.  Left message on voicemail with information to call nurse back at [phone].     Mariela GOOD RN  [facility]  [phone]

## 2024-12-24 NOTE — TELEPHONE ENCOUNTER
Patient notified of test results and providers message, patient has no further questions.  Appointment scheduled for ED&C    Thank you,    Ursula MARSHALLRN BSN  [facility]- [phone]

## 2025-01-13 ENCOUNTER — OFFICE VISIT (OUTPATIENT)
Dept: DERMATOLOGY | Facility: CLINIC | Age: 75
End: 2025-01-13
Payer: COMMERCIAL

## 2025-01-13 DIAGNOSIS — C44.719 BASAL CELL CARCINOMA (BCC) OF SKIN OF LEFT LOWER EXTREMITY INCLUDING HIP: Primary | ICD-10-CM

## 2025-01-13 NOTE — LETTER
1/13/2025      Richard Kitchen  [address] [place] [address]      Dear Colleague,    Thank you for referring your patient, Richard Kitchen, to the [facility]. Please see a copy of my visit note below.    Dermatology Procedure Note: Electrodesiccation and Curettage    PREOPERATIVE DIAGNOSIS: BCC    POSTOPERATIVE DIAGNOSIS: BCC    LOCATION: L medial calf    SIZE: original size:  7 mm     Treatment options including electrodessiccation and curettage (ED and C), excision and topicals were reviewed.  The expected cure rates, healing times and anticipated scars of each option were discussed and the patient elects to proceed with ED and C.     The risks and benefits of the procedure were described to the patient.  These include but are not limited to bleeding, infection, scar, incomplete removal, and recurrence. Written informed consent was obtained. Time-out was performed. The above site was cleansed with and injected with 1 mL1% lidocaine with epinephrine. Once anesthesia was obtained, the site was prepped with Alcohol. The lesion was curetted with  in 3 directions with a 5 mm margin and this was followed by electrodessication.  This process was repeated three times. The defect measured 1.6 cm final size. Vaseline and a bandage were applied to the wound. The patient tolerated the procedure well and was given post care instructions.    Clinical Follow-up: PRN, within 6-12 months for skin check.    Annalee Spence PA-C staffed the patient.     Staff Involved:    Scribe Disclosure:   I, QUIANA ALFRED, am serving as a scribe; to document services personally performed by Annalee Spence PA-C -based on data collection and the provider's statements to me.     Provider Disclosure:  I agree with above History, Review of Systems, Physical exam and Plan.  I have reviewed the content of the documentation and have edited it as needed. I have personally performed the services documented here and the  documentation accurately represents those services and the decisions I have made.      Electronically signed by:    All risks, benefits and alternatives were discussed with patient.  Patient is in agreement and understands the assessment and plan.  All questions were answered.    Annalee Spence PA-C, MPAS  [facility] [place]: Phone: [phone], Fax: [phone]  [facility]: Phone: [phone],  Fax: [phone]  [facility]: Phone: [phone], Fax: [phone]            Again, thank you for allowing me to participate in the care of your patient.        Sincerely,        Annalee Spence PA-C    Electronically signed

## 2025-01-13 NOTE — PATIENT INSTRUCTIONS
Wound Care:  Electrodesiccation and Curettage (ED&C)    I will experience scar, altered skin color, bleeding, swelling, pain, crusting and redness. I may experience altered sensation. Risks are excessive bleeding, infection, muscle weakness, thick (hypertrophic or keloidal) scar, and recurrence. A second procedure may be recommended to obtain the best cosmetic or functional result.    What is electrodesiccation and curettage?  Scraping off tissue (curettage)  Destroy tissue using electric current or cautery (electrodessication)    How do I perform wound care?  Keep dressing in place for 24 hours.  Remove prior to showering  Wash gently with mild soap and water.  Pat dry  Put on a thick layer of Vaseline on the wound using a cotton-swab   Cover the wound with a bandage to protect from dust and tight clothing  Perform wound care once daily until the center of the wound has a pinked over appearance  During wound care, do not allow the area to dry out or form a scab  **If a build up of crust develops, soak a cotton swab in hydrogen peroxide to remove the crust    What do I need?  **Hydrogen peroxide    Cotton-swabs   Vaseline or petroleum jelly   Band-AidsTM or dressing supplies as needed     When should I call the doctor?  [facility]: [phone]  [facility]: [phone]  For urgent needs outside of business hours call the [facility] at [phone] and ask for the dermatology resident on call       Proper skin care from [facility]:    -Eliminate harsh soaps as they strip the natural oils from the skin, often resulting in dry itchy skin ( i.e. Dial, Zest, Yashira Spring)  -Use mild soaps such as Cetaphil or Dove Sensitive Skin in the shower. You do not need to use soap on arms, legs, and trunk every time you shower unless visibly soiled.   -Avoid hot or cold showers.  -After showering, lightly dry off and apply moisturizing within 2-3  minutes. This will help trap moisture in the skin.   -Aggressive use of a moisturizer at least 1-2 times a day to the entire body (including -Vanicream, Cetaphil, Aquaphor or Cerave) and moisturize hands after every washing.  -We recommend using moisturizers that come in a tub that needs to be scooped out, not a pump. This has more of an oil base. It will hold moisture in your skin much better than a water base moisturizer. The above recommended are non-pore clogging.      Wear a sunscreen with at least SPF 30 on your face, ears, neck and V of the chest daily. Wear sunscreen on other areas of the body if those areas are exposed to the sun throughout the day. Sunscreens can contain physical and/or chemical blockers. Physical blockers are less likely to clog pores, these include zinc oxide and titanium dioxide. Reapply every two hour and after swimming.     Sunscreen examples: https://www.ewg.org/sunscreen/    UV radiation  UVA radiation remains constant throughout the day and throughout the year. It is a longer wavelength than UVB and therefore penetrates deeper into the skin leading to immediate and delayed tanning, photoaging, and skin cancer. 70-80% of UVA and UVB radiation occurs between the hours of 10am-2pm.  UVB radiation  UVB radiation causes the most harmful effects and is more significant during the summer months. However, snow and ice can reflect UVB radiation leading to skin damage during the winter months as well. UVB radiation is responsible for tanning, burning, inflammation, delayed erythema (pinkness), pigmentation (brown spots), and skin cancer.     I recommend self monthly full body exams and yearly full body exams with a dermatology provider. If you develop a new or changing lesion please follow up for examination. Most skin cancers are pink and scaly or pink and pearly. However, we do see blue/brown/black skin cancers.  Consider the ABCDEs of melanoma when giving yourself your monthly full body  exam ( don't forget the groin, buttocks, feet, toes, etc). A-asymmetry, B-borders, C-color, D-diameter, E-elevation or evolving. If you see any of these changes please follow up in clinic. If you cannot see your back I recommend purchasing a hand held mirror to use with a larger wall mirror.       Checking for Skin Cancer  You can find cancer early by checking your skin each month. There are 3 kinds of skin cancer. They are melanoma, basal cell carcinoma, and squamous cell carcinoma. Doing monthly skin checks is the best way to find new marks or skin changes. Follow the instructions below for checking your skin.   The ABCDEs of checking moles for melanoma   Check your moles or growths for signs of melanoma using ABCDE:   Asymmetry: the sides of the mole or growth don t match  Border: the edges are ragged, notched, or blurred  Color: the color within the mole or growth varies  Diameter: the mole or growth is larger than 6 mm (size of a pencil eraser)  Evolving: the size, shape, or color of the mole or growth is changing (evolving is not shown in the images below)    Checking for other types of skin cancer  Basal cell carcinoma or squamous cell carcinoma have symptoms such as:     A spot or mole that looks different from all other marks on your skin  Changes in how an area feels, such as itching, tenderness, or pain  Changes in the skin's surface, such as oozing, bleeding, or scaliness  A sore that does not heal  New swelling or redness beyond the border of a mole    Who s at risk?  Anyone can get skin cancer. But you are at greater risk if you have:   Fair skin, light-colored hair, or light-colored eyes  Many moles or abnormal moles on your skin  A history of sunburns from sunlight or tanning beds  A family history of skin cancer  A history of exposure to radiation or chemicals  A weakened immune system  If you have had skin cancer in the past, you are at risk for recurring skin cancer.   How to check your skin  Do  your monthly skin checkups in front of a full-length mirror. Check all parts of your body, including your:   Head (ears, face, neck, and scalp)  Torso (front, back, and sides)  Arms (tops, undersides, upper, and lower armpits)  Hands (palms, backs, and fingers, including under the nails)  Buttocks and genitals  Legs (front, back, and sides)  Feet (tops, soles, toes, including under the nails, and between toes)  If you have a lot of moles, take digital photos of them each month. Make sure to take photos both up close and from a distance. These can help you see if any moles change over time.   Most skin changes are not cancer. But if you see any changes in your skin, call your doctor right away. Only he or she can diagnose a problem. If you have skin cancer, seeing your doctor can be the first step toward getting the treatment that could save your life.   [employer] last reviewed this educational content on 4/1/2019 2000-2020 [employer]. [address] [place] [address]. All rights reserved. This information is not intended as a substitute for professional medical care. Always follow your healthcare professional's instructions.       When should I call my doctor?  If you are worsening or not improving, please, contact us or seek urgent care as noted below.     Who should I call with questions (adults)?    [facility] [phone]  For urgent needs outside of business hours call the [facility] at [phone] and ask for the dermatology resident on call to be paged  If this is a medical emergency and you are unable to reach an ER, Call [phone]      If you need a prescription refill, please contact your pharmacy. Refills are approved or denied by our Physicians during normal business hours, Monday through Fridays  Per office policy, refills will not be granted if you have not been seen within the past year (or sooner depending on your child's condition)

## 2025-01-13 NOTE — PROGRESS NOTES
Dermatology Procedure Note: Electrodesiccation and Curettage    PREOPERATIVE DIAGNOSIS: BCC    POSTOPERATIVE DIAGNOSIS: BCC    LOCATION: L medial calf    SIZE: original size:  7 mm     Treatment options including electrodessiccation and curettage (ED and C), excision and topicals were reviewed.  The expected cure rates, healing times and anticipated scars of each option were discussed and the patient elects to proceed with ED and C.     The risks and benefits of the procedure were described to the patient.  These include but are not limited to bleeding, infection, scar, incomplete removal, and recurrence. Written informed consent was obtained. Time-out was performed. The above site was cleansed with and injected with 1 mL1% lidocaine with epinephrine. Once anesthesia was obtained, the site was prepped with Alcohol. The lesion was curetted with  in 3 directions with a 5 mm margin and this was followed by electrodessication.  This process was repeated three times. The defect measured 1.6 cm final size. Vaseline and a bandage were applied to the wound. The patient tolerated the procedure well and was given post care instructions.    Clinical Follow-up: PRN, within 6-12 months for skin check.    Annalee Spence PA-C staffed the patient.     Staff Involved:    Scribe Disclosure:   I, QUIANA ALFRED, am serving as a scribe; to document services personally performed by Annalee Spence PA-C -based on data collection and the provider's statements to me.     Provider Disclosure:  I agree with above History, Review of Systems, Physical exam and Plan.  I have reviewed the content of the documentation and have edited it as needed. I have personally performed the services documented here and the documentation accurately represents those services and the decisions I have made.      Electronically signed by:    All risks, benefits and alternatives were discussed with patient.  Patient is in agreement and understands the assessment  and plan.  All questions were answered.    Annalee Spence PA-C, MPAS  [facility] [place]: Phone: [phone], Fax: [phone]  [facility]: Phone: [phone],  Fax: [phone]  [facility]: Phone: [phone], Fax: [phone]

## 2025-03-05 ENCOUNTER — VIRTUAL VISIT (OUTPATIENT)
Dept: ADDICTION MEDICINE | Facility: CLINIC | Age: 75
End: 2025-03-05
Payer: COMMERCIAL

## 2025-03-05 DIAGNOSIS — F10.11 ALCOHOL USE DISORDER, MILD, IN EARLY REMISSION: Primary | ICD-10-CM

## 2025-03-05 ASSESSMENT — PAIN SCALES - GENERAL: PAINLEVEL_OUTOF10: NO PAIN (0)

## 2025-03-05 NOTE — PATIENT INSTRUCTIONS
Patient Education   Addiction Medicine  What to Expect  Here's what to expect from our Addiction Medicine program.  About Addiction Medicine  Addiction Medicine clinics help you with substance use problems. You set your own goals. We try to help you reach your goals. Your care plan can include:  Medicine  Creating a recovery plan  Helping you find local resources  Helping with treatment options  Clinic phone number and addresses  Clinic Phone: [phone]  Mental Health and Addiction Clinic  [facility]  [address], [address]  [place] [address]  [facility]  [address], [address]  [place] [address]  Walk-in services  We offer walk-in care for patients at the Recovery Clinic. This is only for patients with Opioid Use Disorder (OUD). Anyone with OUD is welcome. Our providers will refer you to the Recovery Clinic if you're struggling to keep up with your medicines or appointments.  Recovery Clinic ([facility])  [address], [address]  [place] [address]  Phone: [phone]  The Recovery Clinic is open for walk-ins Monday to Friday 9 a.m. to 11:30 a.m. and 12:30 p.m. to 3 p.m.  How it works  Come to your visits every time. The treatment works better when you do.   You can have as many visits as you need. When you're better, we'll refer you back to being cared for by your family doctor.   If you need it, we'll send you to doctors, psychiatrists, therapists, and other providers. We focus on treating addiction. We don't treat other problems, like managing other medicines or non-addiction issues.  About visits  Urine drug testing  We'll often test your pee (urine) for drugs. This is the only way we can know for sure whether or not you're using drugs. It helps us treat you without judgement.   Suboxone (buprenorphine)  If you're taking buprenorphine, you'll have a lot of visits at first. If your problem is getting worse, or you're  "using substances, we may schedule you for extra visits.   Cancelling visits  If you can't come to your visit, please call us right away at [phone]. If you don't cancel at least 24 hours (1 full day) before your visit, that's \"late cancellation.\"  Being late to visits  If you come late, you may not be seen. This will count as a \"no-show.\"  Please call the clinic if you're running late. This will help us plan, but it doesn't mean you'll be seen.   Being late is:  More than 15 minutes late for a return visit.  More than 30 minutes late for your first visit.  If you cancel late or don't show up 2 times within 6 months, we may transfer you to another clinic.   Getting help between visits  If you need help between visits, you can call us Monday to Friday from 8 a.m. to 4:30 p.m. at [phone]. You can also send us a message on [employer]nexus.  Medicine refills  If you miss or cancel a visit, you can still ask for a refill. But we can only refill your medicines if you've made a new appointment.  Please call your pharmacy for medicine refills. If you have a question about your refill, call us at [phone].  It takes up to 2 business days to refill your drugs. Let us know 2 to 3 days before you run out. Don't call more than 1 week before you run out. That's too early.   Please make sure we have your right phone number.  If we have a problem with your refill, we'll call you. If we call you, please call us back right away. If you don't, you may not get your medicines quickly.   Call your pharmacy to find out if your medicines are ready.   Keep your medicines in a safe place. Keep them away from pets and children. If your medicines are lost or stolen, we usually don't replace them. We recommend you file a police report if your medicines are stolen. Your insurance may not pay for early refills, even if you have a prescription.  Forms  Please give us at least 3 business days to fill out any forms. Bring the forms to your " visits if you can. We may refer you to other members of your care team to complete the forms.   Emergency care   Call 911 or go to the nearest emergency room if your life or someone else's life is in danger.  Call 988 anytime for the Suicide and Crisis Lifeline.  If you need care when we're closed, call your family doctor to see if they can help. You can also go to urgent care or an emergency room. [facility] emergency rooms may be able to give you buprenorphine or other medicine refills.  Thank you for choosing us for your care.  For informational purposes only. Not to replace the advice of your health care provider. Copyright   2023 [facility]. All rights reserved. [employer] [id number] - REV 05/23.

## 2025-03-05 NOTE — NURSING NOTE
Is the patient currently in the state of [place]? YES    Current patient location: [address] [place] [address] [address] [place] [address]    Visit mode:Video    If the visit is dropped, the patient can be reconnected by: VIDEO VISIT: Text to cell phone:   Telephone Information:   Mobile [phone]       Will anyone else be joining the visit? Yes: How would they like to receive their invite Text to cell phone: [phone]  (If patient encounters technical issues they should call [phone])    Are changes needed to the allergy or medication list? Pt stated no changes to allergies and Pt stated no med changes    Are refills needed on medications prescribed by this physician? No    Rooming Documentation: Unable to complete qnrs due to time.    Reason for visit: RECHMARLENI Rea, YOSELINF

## 2025-03-05 NOTE — Clinical Note
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 At this time he plans to follow with primary care for long term management. No pharmacotherapy for AUD needed at this time. Please let me know if you have any questions or concerns. He is welcome to follow up with Addiction Medicine at anytime if needed. Thank you, AYLIN Aceves CNP on 3/5/2025 at 8:30 AM

## 2025-03-05 NOTE — PROGRESS NOTES
Virtual Visit Details    Type of service:  Video Visit   Video Start Time:  8:01 AM  Video End Time:8:14 AM  Originating Location (pt. Location): Home  Distant Location (provider location):  Off-site  Platform used for Video Visit: [facility]    A/P                                                    ASSESSMENT/PLAN  Diagnoses and all orders for this visit:  Alcohol use disorder, mild, in early remission    No orders of the defined types were placed in this encounter.      Mar 5, 2025  - AUD symptoms well controlled. Early remission. No use/cravings/desire to drink alcohol.   - Pharmacotherapy not needed at this time.   - Counseled pt to avoiding isolating, avoid triggers and manage cravings.   - Encouraged having some type of sober network and practicing honesty with trusted support person(s). Feels he as adequate support system.   - Consider other services such as counseling, 12 step or other self-help organizations if needed.     - pt will follow up with PCP at this time for long term management. Welcome to return to Addiction Medicine Clinic if needed.       RTC  Return for Welcome to follow up at anytime, follow up with PCP for long term management.        Last encounter A/P  Dec 5, 2024  - AUD well controlled. Early remission. No recent alcohol use or cravings. Continue to monitor.   - PEth test results have decreased to not detectable levels (11/7/24 <10 ng/dL)   - discussed importance of ongoing recovery supports, Vaughn does not feel mutual support groups are needed at this time, such as AA. Strongly encouraged him to start individual therapy both for ongoing recovery support and as intervention for prolonged grief reaction.   - no pharmacotherapy needed at this time.   - additional patient counseling as below.   - will continue to follow up with patient periodically to monitor for above and continue to provide support/counseling.       PDMP Review         Value Time  "User    State PDMP site checked  Yes 3/5/2025  8:23 AM Danica Chambers APRN CNP              SUBJECTIVE                                                    HPI:  Richard Kitchen is a 74 year old male with history of chronic left vertebral artery occlusion, cognitive impairment mild, CKD stage 2, basal cell carcinoma and alcohol use disorder mild who presents for follow up.      LEANNE Hx:    Reports alcohol use starting at age 20 yo. Denies heavy alcohol use over his lifetime. However feels over the past couple years he has been drinking \"more than I should  have\" Attributes this to his wife passing away 3 years ago. He is living alone. Does endorse larger amounts than intended. He tried to reduced his alcohol intake from whiskey or gin to NA wine or NA beer. Has been largely successful in this. Hospitalized from 8/26 - 8/27/24, last alcohol use prior to hospitalization. Denies h/o seizures or DT's. Has not has alcohol withdrawal symptoms in the past. He is open to mutual support groups or programmatic care if needed. However, he would like to first follow with Addiction Medicine outpatient to see if symptoms can be well controlled without pharmacotherapy or programming. Has not taken medication in the past for AUD. His goal is abstinence from alcohol.      Substance Use History:   ALCOHOL - Yes,  reports daily alcohol use 2-4 drinks, hard alcohol. Per chart review, hospitalized August 2024 and GEM at time of admission was 0.25. Last alcohol use ~ 09/2024.   CANNABIS - Denies.   PRESCRIPTION STIMULANTS (includes Ritalin, Adderall, Vyvanse) - Denies  COCAINE/CRACK - Denies  METH/AMPHETAMINES (includes ecstasy, MDMA/julee) - Denies  OPIATES - Possibly prescribed in the past for surgeries, but pt doesn't recall  BENZODIAZEPINES (includes Ativan, Klonopin, Xanax) - Denies  KRATOM (mild opioid and stimulant effects) - Denies  KETAMINE - Denies  HALLUCINOGENS (includes DXM) - Denies  BEHAVIORAL (Gambling, Eating d/o, " "Compulsivity) - Denies  History of treatment - N/A  NICOTINE  Cigarettes: Denies  Chew/snus: Denies  Vaping: Denies  Past NRT/medication use: Denies        Previous withdrawal treatment episodes (e.g. detox): Denies  Previous LEANNE treatment programs: Denies  Hospitalizations or overdose: Yes, August of 2024.  Medical complications from substance use: cognitive impairment   IV Drug use?: Denies  Previous Medication for Addiction Tx: Denies  Longest period of full abstinence: 1.5 months   Activities that have previously supported abstinence: \"Will power, supportive family\"  Current Recovery Activities: \"Will power, supportive family\"        Psychiatric History (per patient report and problem list review)  Past diagnoses - Denies  Current or past psychiatrist: Denies  Current or past therapist:  Denies  Hospitalizations/TMS/ECT - Denies  Suicide Attempts - Denies  Medication trials - Denies     SOCIAL HISTORY:  Housing status: alone, son lives 10 minutes away. Drives around town, but son brings to medical appointments. Moved from [place], [place] 5 years ago. Wife passed away 3 years ago.  Does not see youngest daughter who lives in [place].   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   Relationship status: - wife passed away 3 years ago   Children: Son and daughter. 4 grandchildren   Legal concerns related to use: Denies  Contact information up to date? Yes     Recent HPI Details:  Nov 7, 2024  - reports prior to initial visit in clinic he did have 3-4 low alcohol content beers per month. This was after hospitalization. Denies alcohol use since last appointment. No alcohol cravings. He has a couple bottles of wine at home that he has on hand for when people come over, he does not find this to be triggering. No other alcohol in the house. His son and his family do not drink, this helps especially in social settings.   - does wish to start AA meetings, feels having increase community support will " "be helpful.   - acknowledges how losing his wife really impacted his life. She passed away from myocardial infarction 3 years ago. Continues to struggle with waves of grief. Denies feeling down/depressed, reports feeling intermittent \"sad\" Most days he does ok but the loss comes to mind daily. Open to therapy. Has not done therapy or counseling in the past.   - walking with dog 2.5 miles every morning, kristal Fuentes   - has a walking friend as well   - he is able to complete ADL's. Appetite normal.   Dec 5, 2024  - no alcohol use, no cravings, not difficult not to drink   - strong family support, son and his family are nearby   - working to meet more friends   - thinking about wife daily, multiple times per day, has not yet scheduled therapy, does feel lonely at times   - son and his wife do not drink which has helped immensely. They get together and watch football and there are no environmental triggers to want to have a beer.   - not light headed or dizzy, no recent falls, no issues with balance or coordination     TODAY'S VISIT  HPI Mar 5, 2025  - Vaughn is here today for follow up, doing well. No concerns, Son helped him to get video set up for appointment but was not present for the visit   - denies alcohol use or cravings/desire to drink. He is very happy this has not been a problem for him. It has not been a challenge not to drink. He has not had any difficulty in social settings. Has a group of guys he gets together with regularly and he will order an NA beer.   - son has been a huge support for him   - enjoys walking his dog daily   - looking forward to a trip this late spring early summer to go fishing with friends.   - mood has been \"good\" No lack of motivation or anhedonia. Sleeping well.   - strong support system   - continues to miss his late wife but continues to get easier as the days pass   - does not feel grief counseling needed at this time   - does not feel mutual support groups/meetings " needed at this time.      OBJECTIVE  PHYSICAL EXAM:  There were no vitals taken for this visit.    Physical Exam  Constitutional:       General: He is not in acute distress.     Appearance: Normal appearance.   Eyes:      Extraocular Movements: Extraocular movements intact.   Pulmonary:      Effort: Pulmonary effort is normal.   Neurological:      Mental Status: He is alert and oriented to person, place, and time.   Psychiatric:         Mood and Affect: Mood normal.         Behavior: Behavior normal.         Thought Content: Thought content normal.         Judgment: Judgment normal.             PHQ-9 Score:       2/2/2021    11:17 AM 3/15/2022     3:42 PM   PHQ   PHQ-9 Total Score  7   Q9: Thoughts of better off dead/self-harm past 2 weeks Not at all Not at all        Proxy-reported       KENTON-7 Score:      2/2/2021    11:17 AM   KENTON-7 SCORE   Total Score 0       LABS (may not contain today's labs)                                                      Today's lab data  No results found for any visits on 03/05/25.        HISTORY                                                    Problem list reviewed & adjusted, as indicated.  Patient Active Problem List   Diagnosis    Pure hypercholesterolemia    Sensorineural hearing loss (SNHL) of both ears    Malignant melanoma (H)    Bradycardia    Diminished hearing, bilateral    Renal insufficiency syndrome    Syncope    Elevated prostate specific antigen (PSA)    Stage 3a chronic kidney disease (H)    Need for hepatitis C screening test    Grief reaction with prolonged bereavement    Memory difficulties    History of nonmelanoma skin cancer    Seborrheic keratoses    Cherry angioma    Lentigines    Multiple benign nevi    AK (actinic keratosis)    Thrombocytopenia    Confusion    Alcoholic intoxication without complication    History of memory loss    Alcohol use disorder, mild, in early remission         MEDICATION LIST (after visit)  Current Outpatient Medications    Medication Sig Dispense Refill    ACE/ARB/ARNI NOT PRESCRIBED (INTENTIONAL) Please choose reason not prescribed from choices below. (Patient not taking: Reported on 10/8/2024)      atorvastatin (LIPITOR) 40 MG tablet TAKE 1 TABLET BY MOUTH EVERY DAY 90 tablet 0     No current facility-administered medications for this visit.         Allergies   Allergen Reactions    Similasan Hay Fever Relief [Cold-Eeze]     Seasonal Allergies Other (See Comments)     Rhinitis      I sent a total of 20 minutes today, on the care of this patient. This consisted of face-to-face time as well as time spent on pre-visit and post-visit activities including chart review and documentation.     AYLIN Aceves [facility]  [phone]

## 2025-03-16 DIAGNOSIS — E78.5 DYSLIPIDEMIA: ICD-10-CM

## 2025-03-16 DIAGNOSIS — Z00.00 ENCOUNTER FOR MEDICARE ANNUAL WELLNESS EXAM: ICD-10-CM

## 2025-03-17 RX ORDER — ATORVASTATIN CALCIUM 40 MG/1
40 TABLET, FILM COATED ORAL DAILY
Qty: 90 TABLET | Refills: 1 | Status: SHIPPED | OUTPATIENT
Start: 2025-03-17

## 2025-04-07 ENCOUNTER — PATIENT OUTREACH (OUTPATIENT)
Dept: CARE COORDINATION | Facility: CLINIC | Age: 75
End: 2025-04-07
Payer: MEDICARE

## 2025-04-21 ENCOUNTER — PATIENT OUTREACH (OUTPATIENT)
Dept: CARE COORDINATION | Facility: CLINIC | Age: 75
End: 2025-04-21
Payer: MEDICARE

## 2025-05-13 ENCOUNTER — OFFICE VISIT (OUTPATIENT)
Dept: NEUROLOGY | Facility: CLINIC | Age: 75
End: 2025-05-13
Payer: MEDICARE

## 2025-05-13 DIAGNOSIS — R41.3 MEMORY LOSS: Primary | ICD-10-CM

## 2025-05-13 DIAGNOSIS — G31.84 MILD COGNITIVE IMPAIRMENT: ICD-10-CM

## 2025-05-13 DIAGNOSIS — G31.84 MILD COGNITIVE IMPAIRMENT: Primary | ICD-10-CM

## 2025-05-13 DIAGNOSIS — F06.8 OTHER SPECIFIED MENTAL DISORDERS DUE TO KNOWN PHYSIOLOGICAL CONDITION: ICD-10-CM

## 2025-05-13 DIAGNOSIS — R41.844 FRONTAL LOBE AND EXECUTIVE FUNCTION DEFICIT: ICD-10-CM
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
## 2025-05-13 NOTE — PROGRESS NOTES
Patient was seen for neuropsychological evaluation at the request of Dr. Rafael Stuart, for the purposes of diagnostic clarification and treatment planning.  2 hrs 27 min of test administration and scoring were provided by this writer, Gris Hou.  Please see Dr. Augusto Hernandez's report for a full interpretation of the findings.

## 2025-05-14 ENCOUNTER — OFFICE VISIT (OUTPATIENT)
Dept: FAMILY MEDICINE | Facility: CLINIC | Age: 75
End: 2025-05-14
Payer: MEDICARE

## 2025-05-14 VITALS
RESPIRATION RATE: 16 BRPM | HEART RATE: 65 BPM | TEMPERATURE: 98.5 F | DIASTOLIC BLOOD PRESSURE: 66 MMHG | BODY MASS INDEX: 27.87 KG/M2 | WEIGHT: 183.9 LBS | HEIGHT: 68 IN | SYSTOLIC BLOOD PRESSURE: 92 MMHG | OXYGEN SATURATION: 97 %

## 2025-05-14 DIAGNOSIS — N18.31 STAGE 3A CHRONIC KIDNEY DISEASE (H): ICD-10-CM

## 2025-05-14 DIAGNOSIS — Z23 NEED FOR VACCINATION: ICD-10-CM

## 2025-05-14 DIAGNOSIS — F10.21 ALCOHOL DEPENDENCE IN REMISSION (H): ICD-10-CM

## 2025-05-14 DIAGNOSIS — R41.89 COGNITIVE IMPAIRMENT: ICD-10-CM

## 2025-05-14 DIAGNOSIS — C43.9 MALIGNANT MELANOMA, UNSPECIFIED SITE (H): ICD-10-CM

## 2025-05-14 DIAGNOSIS — Z79.818 LONG TERM (CURRENT) USE OF OTHER AGENTS AFFECTING ESTROGEN RECEPTORS AND ESTROGEN LEVELS: ICD-10-CM

## 2025-05-14 DIAGNOSIS — Z00.00 ENCOUNTER FOR MEDICARE ANNUAL WELLNESS EXAM: Primary | ICD-10-CM

## 2025-05-14 PROBLEM — F10.920 ALCOHOLIC INTOXICATION WITHOUT COMPLICATION: Status: RESOLVED | Noted: 2024-08-26 | Resolved: 2025-05-14

## 2025-05-14 SDOH — HEALTH STABILITY: PHYSICAL HEALTH: ON AVERAGE, HOW MANY DAYS PER WEEK DO YOU ENGAGE IN MODERATE TO STRENUOUS EXERCISE (LIKE A BRISK WALK)?: 7 DAYS

## 2025-05-14 ASSESSMENT — SOCIAL DETERMINANTS OF HEALTH (SDOH): HOW OFTEN DO YOU GET TOGETHER WITH FRIENDS OR RELATIVES?: MORE THAN THREE TIMES A WEEK

## 2025-05-14 ASSESSMENT — PAIN SCALES - GENERAL: PAINLEVEL_OUTOF10: NO PAIN (0)
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
## 2025-05-14 NOTE — PATIENT INSTRUCTIONS
Patient Education   Preventive Care Advice   This is general advice given by our system to help you stay healthy. However, your care team may have specific advice just for you. Please talk to your care team about your preventive care needs.  Nutrition  Eat 5 or more servings of fruits and vegetables each day.  Try wheat bread, brown rice and whole grain pasta (instead of white bread, rice, and pasta).  Get enough calcium and vitamin D. Check the label on foods and aim for 100% of the RDA (recommended daily allowance).  Lifestyle  Exercise at least 150 minutes each week  (30 minutes a day, 5 days a week).  Do muscle strengthening activities 2 days a week. These help control your weight and prevent disease.  No smoking.  Wear sunscreen to prevent skin cancer.  Have a dental exam and cleaning every 6 months.  Yearly exams  See your health care team every year to talk about:  Any changes in your health.  Any medicines your care team has prescribed.  Preventive care, family planning, and ways to prevent chronic diseases.  Shots (vaccines)   HPV shots (up to age 26), if you've never had them before.  Hepatitis B shots (up to age 59), if you've never had them before.  COVID-19 shot: Get this shot when it's due.  Flu shot: Get a flu shot every year.  Tetanus shot: Get a tetanus shot every 10 years.  Pneumococcal, hepatitis A, and RSV shots: Ask your care team if you need these based on your risk.  Shingles shot (for age 50 and up)  General health tests  Diabetes screening:  Starting at age 35, Get screened for diabetes at least every 3 years.  If you are younger than age 35, ask your care team if you should be screened for diabetes.  Cholesterol test: At age 39, start having a cholesterol test every 5 years, or more often if advised.  Bone density scan (DEXA): At age 50, ask your care team if you should have this scan for osteoporosis (brittle bones).  Hepatitis C: Get tested at least once in your life.  STIs (sexually  transmitted infections)  Before age 24: Ask your care team if you should be screened for STIs.  After age 24: Get screened for STIs if you're at risk. You are at risk for STIs (including HIV) if:  You are sexually active with more than one person.  You don't use condoms every time.  You or a partner was diagnosed with a sexually transmitted infection.  If you are at risk for HIV, ask about PrEP medicine to prevent HIV.  Get tested for HIV at least once in your life, whether you are at risk for HIV or not.  Cancer screening tests  Cervical cancer screening: If you have a cervix, begin getting regular cervical cancer screening tests starting at age 21.  Breast cancer scan (mammogram): If you've ever had breasts, begin having regular mammograms starting at age 40. This is a scan to check for breast cancer.  Colon cancer screening: It is important to start screening for colon cancer at age 45.  Have a colonoscopy test every 10 years (or more often if you're at risk) Or, ask your provider about stool tests like a FIT test every year or Cologuard test every 3 years.  To learn more about your testing options, visit:   .  For help making a decision, visit:   https://bit.ly/do20153.  Prostate cancer screening test: If you have a prostate, ask your care team if a prostate cancer screening test (PSA) at age 55 is right for you.  Lung cancer screening: If you are a current or former smoker ages 50 to 80, ask your care team if ongoing lung cancer screenings are right for you.  For informational purposes only. Not to replace the advice of your health care provider. Copyright   2023 [facility]. All rights reserved. Clinically reviewed by the [facility]. [employer] [id number] - REV 01/24.

## 2025-05-14 NOTE — PROGRESS NOTES
Name: Richard Kitchen MRN: [id number]  : 1950  MISHRA: 2025  Staff: DUY Tech: AMANUEL Age: 75  Sex: Male Hand: Right Educ: 16  Vision: 20/20 ?with correction / []without correction    ORIENTATION     Time  -14     Place  0 /2     Personal info     3 /4    WAIS-IV      WMI: ~102        Raw SSa     Similarities  26 12     Block Design  36 12     Digit Span  26 11 RDS= 10     Arithmetic  12 10     Coding  29 7       HVLT-R Form 1     Trial 1 2 3 Total      3 3 3  9    Raw T     Total Learning (1-3) 9 24     Delayed Recall  0 <=20     Percent Retention 0 <=20     Recognition Hits/FP 10/2 37    BVMT-R Form 1     Trial 1 2 3 Total      2 3 3  8    Raw T / %ile     Total Learning (1-3) 8 29     Delayed Recall  0 <20     Percent Retention 0 <1st     Recognition Hits/FP  11th-16th    WMS-IV  Raw SS / %ile     LM I  20 7     LM II  0 1     LM Recog. 14  10-16    AMAN-O    Raw  T %ile     Copy    24.0     2-5     Time to Copy  406     >16    WRAT5   SS %ile Grade Equiv.     Word Reading  122 93 >12.9    COWAT (FAS)   Raw: 16   T: 27    Animals   Raw:  11  T-score:  31    [place] NAMING TEST   Raw: 44   %ile <4      COMPLEX IDEATIONAL MATERIAL   Raw:  T: 22    TRAILS  Raw  Err %ile    A 45  0 <10   B 172  0 <10    STROOP Raw %ile   Word 75 10   Color  50 <10       Color/Word  25 <10    MASHA Short  Raw: 13   %ile: 46-58    GROOVED PEGBOARD    Raw  T Drops   RH  110  41 0   LH 86  53 0    KRISTI  Raw:  4  Interpretation: Minimal    GDS   Raw:  2   Interpretation: Minimal      DCT E-score: 12

## 2025-05-14 NOTE — PROGRESS NOTES
"Preventive Care Visit  [facility]  May 14, 2025      Assessment & Plan       ICD-10-CM    1. Encounter for Medicare annual wellness exam  Z00.00       2. Cognitive impairment  R41.89       3. Alcohol dependence in remission (H)  F10.21 DX Bone Density      4. Stage 3a chronic kidney disease (H)  N18.31       5. Malignant melanoma, unspecified site (H)  C43.9       6. Long term (current) use of other agents affecting estrogen receptors and estrogen levels  Z79.818 DX Bone Density      7. Need for vaccination  Z23 RSV vaccine, bivalent, ABRYSVO, injection        Cognitive impairment  - Mini-cog abnormal today; patient did not recall his neuropsych testing was yesterday upon initial questioning. Continue follow-up with neuropsych as scheduled to review results.    Alcohol dependence  - Continue to maintain sobriety.  - Recommend DEXA to evaluate bone density given hx alcohol dependence, ordered.    CKD, stage III  - Stable per most recent BMP last fall  - Due for urine microalb, but unable to produce sample today so deferred to next appointment    Malignant melanoma  - Continue regular skin checks with derm as scheduled.         BMI  Estimated body mass index is 27.62 kg/m  as calculated from the following:    Height as of this encounter: 1.738 m (5' 8.43\").    Weight as of this encounter: 83.4 kg (183 lb 14.4 oz).       Counseling  Appropriate preventive services were addressed with this patient via screening, questionnaire, or discussion as appropriate for fall prevention, nutrition, physical activity, Tobacco-use cessation, social engagement, weight loss and cognition.  Checklist reviewing preventive services available has been given to the patient.  Reviewed patient's diet, addressing concerns and/or questions.   The patient was instructed to see the dentist every 6 months.   Addressed any concerns about safety while driving.      Follow-up    " Follow-up Visit   Expected date:  Nov 14, 2025 (Approximate)      Follow Up Appointment Details:     Follow-up with whom?: PCP    Follow-Up for what?: Chronic Disease f/u    Chronic Disease f/u: Hypertension    How?: In Person                 Delfino Mendes is a 75 year old, presenting for the following:  Wellness Visit        5/14/2025    10:15 AM   Additional Questions   Roomed by Alisson SOMMERS     Patient of Dr Carbones; here for annual wellness exam. He has been sober from alcohol for the past year! He underwent neuropsych evaluation yesterday; his son was present during this test.    Advance Care Planning    Discussed advance care planning with patient; however, patient declined at this time.        5/14/2025   General Health   How would you rate your overall physical health? Good   Feel stress (tense, anxious, or unable to sleep) Not at all         5/14/2025   Nutrition   Diet: Regular (no restrictions)    Vegetarian/vegan       Multiple values from one day are sorted in reverse-chronological order         5/14/2025   Exercise   Days per week of moderate/strenous exercise 7 days         5/14/2025   Social Factors   Frequency of gathering with friends or relatives More than three times a week   Worry food won't last until get money to buy more No   Food not last or not have enough money for food? No   Do you have housing? (Housing is defined as stable permanent housing and does not include staying outside in a car, in a tent, in an abandoned building, in an overnight shelter, or couch-surfing.) Yes   Are you worried about losing your housing? No   Lack of transportation? No   Unable to get utilities (heat,electricity)? No         5/14/2025   Fall Risk   Fallen 2 or more times in the past year? No   Trouble with walking or balance? No          5/14/2025   Activities of Daily Living- Home Safety   Needs help with the following daily activites None of the above   Safety concerns in the home None of  the above         5/14/2025   Dental   Dentist two times every year? (!) NO         5/14/2025   Hearing Screening   Hearing concerns? None of the above         5/14/2025   Driving Risk Screening   Patient/family members have concerns about driving (!) YES - concern raised during neuropsych evaluation yesterday.         5/14/2025   General Alertness/Fatigue Screening   Have you been more tired than usual lately? No         5/14/2025   Urinary Incontinence Screening   Bothered by leaking urine in past 6 months No         Today's PHQ-2 Score:       5/14/2025    10:12 AM   PHQ-2 ( 1999 [employer])   Q1: Little interest or pleasure in doing things 0    Q2: Feeling down, depressed or hopeless 0    PHQ-2 Score 0    Q1: Little interest or pleasure in doing things Not at all   Q2: Feeling down, depressed or hopeless Not at all   PHQ-2 Score 0       Proxy-reported           5/14/2025   Substance Use   Alcohol more than 3/day or more than 7/wk No   Do you have a current opioid prescription? No   How severe/bad is pain from 1 to 10? 0/10 (No Pain)   Do you use any other substances recreationally? No     Social History     Tobacco Use    Smoking status: Former     Current packs/day: 0.50     Average packs/day: 0.5 packs/day for 1 year (0.5 ttl pk-yrs)     Types: Cigarettes    Smokeless tobacco: Never   Vaping Use    Vaping status: Never Used   Substance Use Topics    Alcohol use: Not Currently     Comment: Occasional    Drug use: Never       ASCVD Risk   The 10-year ASCVD risk score (Sangita CASTILLO, et al., 2019) is: 14.9%    Values used to calculate the score:      Age: 75 years      Sex: Male      Is Non- : No      Diabetic: No      Tobacco smoker: No      Systolic Blood Pressure: 92 mmHg      Is BP treated: No      HDL Cholesterol: 54 mg/dL      Total Cholesterol: 143 mg/dL            Reviewed and updated as needed this visit by Provider                      Current providers sharing in care for this  patient include:  Patient Care Team:  Susanna Acosta MD as PCP - General (Internal Medicine)  Susanna Acosta MD as Assigned PCP  Johnie Gunn MD as MD (Urology)  Annalee Spence PA-C as Physician Assistant (Dermatology)  Flores Bullard NP as Nurse Practitioner (ENT-Otolaryngology)  Rafael Stuart MD as MD (Neurology)  Annalee Spence PA-C as Physician Assistant (Dermatology)  Rafael Stuart MD as Assigned Neuroscience Provider  Augusot Hernandez, PhD LP as MD (Neuropsychology)  Danica Chambers APRN CNP as Assigned Behavioral Health Provider  Annalee Spence PA-C as Assigned Dermatology Provider    The following health maintenance items are reviewed in Epic and correct as of today:  Health Maintenance   Topic Date Due    RSV VACCINE (1 - 1-dose 75+ series) Never done    MICROALBUMIN  05/07/2025    LIPID  08/26/2025    HEMOGLOBIN  08/26/2025    BMP  09/10/2025    COVID-19 Vaccine (9 - 2024-25 season) 11/14/2025    MEDICARE ANNUAL WELLNESS VISIT  05/14/2026    ANNUAL REVIEW OF HM ORDERS  05/14/2026    FALL RISK ASSESSMENT  05/14/2026    DIABETES SCREENING  09/10/2027    ADVANCE CARE PLANNING  05/14/2030    DTAP/TDAP/TD IMMUNIZATION (4 - Td or Tdap) 02/02/2031    HEPATITIS C SCREENING  Completed    PHQ-2 (once per calendar year)  Completed    INFLUENZA VACCINE  Completed    Pneumococcal Vaccine: 50+ Years  Completed    URINALYSIS  Completed    ZOSTER IMMUNIZATION  Completed    HPV IMMUNIZATION  Aged Out    MENINGITIS IMMUNIZATION  Aged Out    COLORECTAL CANCER SCREENING  Discontinued    LUNG CANCER SCREENING  Discontinued         Review of Systems  Constitutional, HEENT, cardiovascular, pulmonary, GI, , musculoskeletal, neuro, skin, endocrine and psych systems are negative, except as otherwise noted.     Objective    Exam  BP 92/66 (BP Location: Left arm, Patient Position: Sitting, Cuff Size: Adult Large)   Pulse 65   Temp 98.5  F (36.9  C)  "(Tympanic)   Resp 16   Ht 1.738 m (5' 8.43\")   Wt 83.4 kg (183 lb 14.4 oz)   SpO2 97%   BMI 27.62 kg/m     Estimated body mass index is 27.62 kg/m  as calculated from the following:    Height as of this encounter: 1.738 m (5' 8.43\").    Weight as of this encounter: 83.4 kg (183 lb 14.4 oz).    Physical Exam  GENERAL:  WDWN, no acute distress  PSYCH: pleasant, cooperative  EYES: no discharge, no injection  HENT:  Normocephalic. Moist mucus membranes.  NECK:  Supple, symmetric  LUNGS:  Clear to auscultation bilaterally without rhonchi, rales, or wheeze. Chest rise symmetric and no tenderness to palpation.  HEART:  Regular rate & rhythm. No murmur, gallop, or rub.  EXTREMITIES:  No gross deformities, moves all 4 limbs spontaneously, no peripheral edema  SKIN:  Warm and dry, no rash or suspicious lesions    NEUROLOGIC:  Alert, sensation grossly intact.         5/14/2025   Mini Cog   Clock Draw Score 2 Normal   3 Item Recall 0 objects recalled   Mini Cog Total Score 2              Signed Electronically by: Maricel Fabian PA-C    "